# Patient Record
Sex: MALE | Race: WHITE | Employment: FULL TIME | URBAN - METROPOLITAN AREA
[De-identification: names, ages, dates, MRNs, and addresses within clinical notes are randomized per-mention and may not be internally consistent; named-entity substitution may affect disease eponyms.]

---

## 2017-08-31 PROCEDURE — 84153 ASSAY OF PSA TOTAL: CPT | Performed by: INTERNAL MEDICINE

## 2017-09-18 PROBLEM — R01.1 SYSTOLIC MURMUR: Status: ACTIVE | Noted: 2017-09-18

## 2018-07-31 PROCEDURE — 84153 ASSAY OF PSA TOTAL: CPT | Performed by: INTERNAL MEDICINE

## 2018-08-14 PROBLEM — R01.1 SYSTOLIC MURMUR: Status: RESOLVED | Noted: 2017-09-18 | Resolved: 2018-08-14

## 2018-08-14 PROBLEM — R06.83 SNORING: Status: ACTIVE | Noted: 2018-08-14

## 2018-08-14 PROBLEM — Z01.810 PRE-OPERATIVE CARDIOVASCULAR EXAMINATION: Status: ACTIVE | Noted: 2018-08-14

## 2018-08-23 PROBLEM — R94.39 ABNORMAL NUCLEAR STRESS TEST: Status: ACTIVE | Noted: 2018-08-23

## 2018-08-28 ENCOUNTER — HOSPITAL ENCOUNTER (OUTPATIENT)
Dept: CT IMAGING | Facility: HOSPITAL | Age: 58
Discharge: HOME | End: 2018-08-28
Attending: INTERNAL MEDICINE
Payer: COMMERCIAL

## 2018-08-28 DIAGNOSIS — R94.39 ABNORMAL NUCLEAR STRESS TEST: ICD-10-CM

## 2018-08-28 DIAGNOSIS — Z01.810 PRE-OPERATIVE CARDIOVASCULAR EXAMINATION: ICD-10-CM

## 2018-08-28 PROCEDURE — 82565 ASSAY OF CREATININE: CPT

## 2018-08-28 PROCEDURE — 75574 CT ANGIO HRT W/3D IMAGE: CPT | Performed by: INTERNAL MEDICINE

## 2018-08-28 RX ORDER — METOPROLOL TARTRATE 5 MG/5ML
INJECTION INTRAVENOUS
Status: COMPLETED
Start: 2018-08-28 | End: 2018-08-28

## 2018-08-28 RX ORDER — NITROGLYCERIN 0.4 MG/1
TABLET SUBLINGUAL
Status: COMPLETED
Start: 2018-08-28 | End: 2018-08-28

## 2018-08-28 RX ADMIN — METOPROLOL TARTRATE 5 MG: 5 INJECTION INTRAVENOUS at 09:35:00

## 2018-08-28 RX ADMIN — NITROGLYCERIN 0.4 MG: 0.4 TABLET SUBLINGUAL at 09:40:00

## 2018-08-30 LAB — CREAT SERPL-MCNC: 1.1 MG/DL (ref 0.7–1.3)

## 2018-10-03 ENCOUNTER — HOSPITAL ENCOUNTER (OUTPATIENT)
Dept: CT IMAGING | Facility: HOSPITAL | Age: 58
Discharge: HOME | End: 2018-10-03
Attending: INTERNAL MEDICINE
Payer: COMMERCIAL

## 2018-10-03 DIAGNOSIS — R91.1 INCIDENTAL LUNG NODULE, GREATER THAN OR EQUAL TO 8MM: ICD-10-CM

## 2018-10-03 PROCEDURE — 71250 CT THORAX DX C-: CPT | Performed by: INTERNAL MEDICINE

## 2018-10-03 NOTE — PROGRESS NOTES
CT chest confirmed presence of lung nodule along with one enlarged LN.   Will obtain PET scan to further evaluate and would like to have pt come see me in clinic

## 2018-10-11 ENCOUNTER — HOSPITAL ENCOUNTER (OUTPATIENT)
Dept: NUCLEAR MEDICINE | Facility: HOSPITAL | Age: 58
Discharge: HOME | End: 2018-10-11
Attending: INTERNAL MEDICINE
Payer: COMMERCIAL

## 2018-10-11 DIAGNOSIS — R91.1 INCIDENTAL LUNG NODULE, GREATER THAN OR EQUAL TO 8MM: ICD-10-CM

## 2018-10-11 PROCEDURE — 82962 GLUCOSE BLOOD TEST: CPT

## 2018-10-11 PROCEDURE — 78815 PET IMAGE W/CT SKULL-THIGH: CPT | Performed by: INTERNAL MEDICINE

## 2018-10-11 NOTE — PROGRESS NOTES
Please inform pt that PET scan was normal.  There was no sig uptake in enlarged LN. Will follow with repeat scan in 6 months to ensure stability.   Would hold on biopsy b/c of low risk for CA in nonsmoker with normal PET

## 2018-12-14 ENCOUNTER — APPOINTMENT (OUTPATIENT)
Dept: LAB | Facility: HOSPITAL | Age: 58
End: 2018-12-14
Attending: OTOLARYNGOLOGY
Payer: COMMERCIAL

## 2018-12-14 DIAGNOSIS — I10 ESSENTIAL HYPERTENSION, BENIGN: ICD-10-CM

## 2018-12-14 PROCEDURE — 80053 COMPREHEN METABOLIC PANEL: CPT

## 2018-12-14 PROCEDURE — 36415 COLL VENOUS BLD VENIPUNCTURE: CPT

## 2018-12-14 PROCEDURE — 93005 ELECTROCARDIOGRAM TRACING: CPT

## 2018-12-14 PROCEDURE — 93010 ELECTROCARDIOGRAM REPORT: CPT | Performed by: INTERNAL MEDICINE

## 2018-12-27 ENCOUNTER — LAB REQUISITION (OUTPATIENT)
Dept: LAB | Facility: HOSPITAL | Age: 58
End: 2018-12-27
Payer: COMMERCIAL

## 2018-12-27 DIAGNOSIS — L98.0 PYOGENIC GRANULOMA: ICD-10-CM

## 2018-12-27 PROCEDURE — 88342 IMHCHEM/IMCYTCHM 1ST ANTB: CPT | Performed by: OTOLARYNGOLOGY

## 2018-12-27 PROCEDURE — 88305 TISSUE EXAM BY PATHOLOGIST: CPT | Performed by: OTOLARYNGOLOGY

## 2018-12-27 PROCEDURE — 88341 IMHCHEM/IMCYTCHM EA ADD ANTB: CPT | Performed by: OTOLARYNGOLOGY

## 2018-12-27 PROCEDURE — 88365 INSITU HYBRIDIZATION (FISH): CPT | Performed by: OTOLARYNGOLOGY

## 2018-12-27 PROCEDURE — 88321 CONSLTJ&REPRT SLD PREP ELSWR: CPT | Performed by: OTOLARYNGOLOGY

## 2019-02-15 ENCOUNTER — OFFICE VISIT (OUTPATIENT)
Dept: HEMATOLOGY/ONCOLOGY | Facility: HOSPITAL | Age: 59
End: 2019-02-15
Attending: INTERNAL MEDICINE
Payer: COMMERCIAL

## 2019-02-15 VITALS
BODY MASS INDEX: 32.91 KG/M2 | WEIGHT: 243 LBS | HEIGHT: 72 IN | HEART RATE: 99 BPM | TEMPERATURE: 98 F | DIASTOLIC BLOOD PRESSURE: 83 MMHG | OXYGEN SATURATION: 94 % | RESPIRATION RATE: 18 BRPM | SYSTOLIC BLOOD PRESSURE: 135 MMHG

## 2019-02-15 DIAGNOSIS — C90.20: Primary | ICD-10-CM

## 2019-02-15 LAB
ALBUMIN SERPL-MCNC: 3.7 G/DL (ref 3.4–5)
ALBUMIN/GLOB SERPL: 1.1 {RATIO} (ref 1–2)
ALP LIVER SERPL-CCNC: 86 U/L (ref 45–117)
ALT SERPL-CCNC: 22 U/L (ref 16–61)
ANION GAP SERPL CALC-SCNC: 6 MMOL/L (ref 0–18)
AST SERPL-CCNC: 16 U/L (ref 15–37)
B2 MICROGLOB SERPL-MCNC: 0.2 MG/DL (ref 0.11–0.25)
BASOPHILS # BLD AUTO: 0.07 X10(3) UL (ref 0–0.2)
BASOPHILS NFR BLD AUTO: 1.2 %
BILIRUB DIRECT SERPL-MCNC: 0.1 MG/DL (ref 0–0.2)
BILIRUB SERPL-MCNC: 0.5 MG/DL (ref 0.1–2)
BUN BLD-MCNC: 13 MG/DL (ref 7–18)
BUN/CREAT SERPL: 12.9 (ref 10–20)
CALCIUM BLD-MCNC: 8.4 MG/DL (ref 8.5–10.1)
CHLORIDE SERPL-SCNC: 109 MMOL/L (ref 98–107)
CO2 SERPL-SCNC: 28 MMOL/L (ref 21–32)
CREAT BLD-MCNC: 1.01 MG/DL (ref 0.7–1.3)
DEPRECATED RDW RBC AUTO: 41.6 FL (ref 35.1–46.3)
EOSINOPHIL # BLD AUTO: 0.22 X10(3) UL (ref 0–0.7)
EOSINOPHIL NFR BLD AUTO: 3.7 %
ERYTHROCYTE [DISTWIDTH] IN BLOOD BY AUTOMATED COUNT: 14.1 % (ref 11–15)
GLOBULIN PLAS-MCNC: 3.3 G/DL (ref 2.8–4.4)
GLUCOSE BLD-MCNC: 101 MG/DL (ref 70–99)
HAV IGM SER QL: NONREACTIVE
HBV CORE IGM SER QL: NONREACTIVE
HBV SURFACE AG SERPL QL IA: NONREACTIVE
HCT VFR BLD AUTO: 43.4 % (ref 39–53)
HCV AB SERPL QL IA: NONREACTIVE
HGB BLD-MCNC: 14.4 G/DL (ref 13–17.5)
IGA SERPL-MCNC: 168 MG/DL (ref 70–312)
IGM SERPL-MCNC: 27.5 MG/DL (ref 43–279)
IMM GRANULOCYTES # BLD AUTO: 0.01 X10(3) UL (ref 0–1)
IMM GRANULOCYTES NFR BLD: 0.2 %
IMMUNOGLOBULIN PNL SER-MCNC: 850 MG/DL (ref 791–1643)
LDH SERPL L TO P-CCNC: 170 U/L (ref 87–241)
LYMPHOCYTES # BLD AUTO: 1.8 X10(3) UL (ref 1–4)
LYMPHOCYTES NFR BLD AUTO: 29.9 %
M PROTEIN MFR SERPL ELPH: 7 G/DL (ref 6.4–8.2)
MCH RBC QN AUTO: 27.3 PG (ref 26–34)
MCHC RBC AUTO-ENTMCNC: 33.2 G/DL (ref 31–37)
MCV RBC AUTO: 82.4 FL (ref 80–100)
MONOCYTES # BLD AUTO: 0.61 X10(3) UL (ref 0.1–1)
MONOCYTES NFR BLD AUTO: 10.1 %
NEUTROPHILS # BLD AUTO: 3.31 X10 (3) UL (ref 1.5–7.7)
NEUTROPHILS # BLD AUTO: 3.31 X10(3) UL (ref 1.5–7.7)
NEUTROPHILS NFR BLD AUTO: 54.9 %
OSMOLALITY SERPL CALC.SUM OF ELEC: 296 MOSM/KG (ref 275–295)
PLATELET # BLD AUTO: 268 10(3)UL (ref 150–450)
POTASSIUM SERPL-SCNC: 3.9 MMOL/L (ref 3.5–5.1)
RBC # BLD AUTO: 5.27 X10(6)UL (ref 4.3–5.7)
SODIUM SERPL-SCNC: 143 MMOL/L (ref 136–145)
WBC # BLD AUTO: 6 X10(3) UL (ref 4–11)

## 2019-02-15 PROCEDURE — 99245 OFF/OP CONSLTJ NEW/EST HI 55: CPT | Performed by: INTERNAL MEDICINE

## 2019-02-15 RX ORDER — LORAZEPAM 1 MG/1
1 TABLET ORAL EVERY 6 HOURS PRN
Qty: 10 TABLET | Refills: 0 | Status: SHIPPED
Start: 2019-02-15 | End: 2019-02-22

## 2019-02-15 NOTE — PROGRESS NOTES
Patient is here for MD consult. Patient was found to have a mediastinal lymph node seen on a CT scan in October. PET/Ct done on 10/11. Prior to having knee replacement surgery, patient was told he had obstruction of the nasal passages.  Patient then followe

## 2019-02-16 NOTE — CONSULTS
[facility]    PATIENT'S NAME: Akila Slater   CONSULTING PHYSICIAN: Giovana Pelayo M.D.    PATIENT ACCOUNT #: [de-identified] LOCATION: [address] [place] [address] RECORD #: [id number] YOB: 1960   CONSULTATION DATE: 02/15/2019       CANCER [address] It was interpreted as a plasmacytic neoplasm consistent with multiple myeloma or plasmacytoma. The cells were of low proliferative rate with a Ki-67 around 10%. There were some higher proliferations at the periphery of the lesion.   The plas Penicillin. REVIEW OF SYSTEMS:  No headaches. His vision is fair. His hearing is good. His teeth and gums are in good repair. He denies any asthma, cough, or shortness of breath. He denies any GERD. He has never had an upper endoscopy.   He had a c low IgM at 27.5. Hepatitis panel was checked and was negative. CBC is normal.  Beta-2 microglobulin was normal.  LDH is normal.    IMPRESSION:  Extramedullary plasmacytoma.   He has no evidence of any other focal lesions based upon a PET scan done in [facility] reassuring. I will plan on doing a bone marrow on him once he returns. His questions were answered. I will see him again to perform his bone marrow in March, and then we will proceed with the appropriate intervention thereafter.     Dictated By Martha Miranda

## 2019-02-21 LAB
ALBUMIN SERPL-MCNC: 4.28 G/DL (ref 3.1–4.5)
ALBUMIN/GLOB SERPL: 1.84 {RATIO}
ALPHA1 GLOB SERPL ELPH-MCNC: 0.2 G/DL (ref 0.1–0.3)
ALPHA2 GLOB SERPL ELPH-MCNC: 0.71 G/DL (ref 0.6–1)
B-GLOBULIN SERPL ELPH-MCNC: 0.67 G/DL (ref 0.7–1.2)
GAMMA GLOB SERPL ELPH-MCNC: 0.74 G/DL (ref 0.6–1.6)
KAPPA FREE LIGHT CHAIN: 1.9 MG/DL (ref 0.33–1.94)
KAPPA/LAMBDA FLC RATIO: 1.05 (ref 0.26–1.65)
LAMBDA FREE LIGHT CHAIN: 1.81 MG/DL (ref 0.57–2.63)
MAI PROTEIN SERPL-MCNC: 6.6 G/DL (ref 6.4–8.2)

## 2019-03-07 ENCOUNTER — ANESTHESIA EVENT (OUTPATIENT)
Dept: MRI IMAGING | Facility: HOSPITAL | Age: 59
End: 2019-03-07

## 2019-03-19 ENCOUNTER — ANESTHESIA (OUTPATIENT)
Dept: MRI IMAGING | Facility: HOSPITAL | Age: 59
End: 2019-03-19

## 2019-03-19 ENCOUNTER — HOSPITAL ENCOUNTER (OUTPATIENT)
Dept: MRI IMAGING | Facility: HOSPITAL | Age: 59
Discharge: HOME | End: 2019-03-19
Attending: INTERNAL MEDICINE
Payer: COMMERCIAL

## 2019-03-19 VITALS
HEIGHT: 73 IN | HEART RATE: 74 BPM | RESPIRATION RATE: 18 BRPM | TEMPERATURE: 98 F | OXYGEN SATURATION: 97 % | SYSTOLIC BLOOD PRESSURE: 117 MMHG | WEIGHT: 236 LBS | BODY MASS INDEX: 31.28 KG/M2 | DIASTOLIC BLOOD PRESSURE: 80 MMHG

## 2019-03-19 DIAGNOSIS — C90.20 EXTRAMEDULLARY PLASMACYTOMA (HCC): ICD-10-CM

## 2019-03-19 PROCEDURE — 70543 MRI ORBT/FAC/NCK W/O &W/DYE: CPT | Performed by: INTERNAL MEDICINE

## 2019-03-19 PROCEDURE — A9575 INJ GADOTERATE MEGLUMI 0.1ML: HCPCS | Performed by: INTERNAL MEDICINE

## 2019-03-19 RX ORDER — HYDROCODONE BITARTRATE AND ACETAMINOPHEN 5; 325 MG/1; MG/1
1 TABLET ORAL AS NEEDED
Status: DISCONTINUED | OUTPATIENT
Start: 2019-03-19 | End: 2019-03-21

## 2019-03-19 RX ORDER — HYDROMORPHONE HYDROCHLORIDE 1 MG/ML
0.4 INJECTION, SOLUTION INTRAMUSCULAR; INTRAVENOUS; SUBCUTANEOUS EVERY 5 MIN PRN
Status: ACTIVE | OUTPATIENT
Start: 2019-03-19 | End: 2019-03-20

## 2019-03-19 RX ORDER — SODIUM CHLORIDE, SODIUM LACTATE, POTASSIUM CHLORIDE, CALCIUM CHLORIDE 600; 310; 30; 20 MG/100ML; MG/100ML; MG/100ML; MG/100ML
INJECTION, SOLUTION INTRAVENOUS CONTINUOUS
Status: DISCONTINUED | OUTPATIENT
Start: 2019-03-19 | End: 2019-03-21

## 2019-03-19 RX ORDER — DEXAMETHASONE SODIUM PHOSPHATE 4 MG/ML
8 VIAL (ML) INJECTION AS NEEDED
Status: ACTIVE | OUTPATIENT
Start: 2019-03-19 | End: 2019-03-20

## 2019-03-19 RX ORDER — NALOXONE HYDROCHLORIDE 0.4 MG/ML
80 INJECTION, SOLUTION INTRAMUSCULAR; INTRAVENOUS; SUBCUTANEOUS AS NEEDED
Status: ACTIVE | OUTPATIENT
Start: 2019-03-19 | End: 2019-03-20

## 2019-03-19 RX ORDER — ONDANSETRON 2 MG/ML
4 INJECTION INTRAMUSCULAR; INTRAVENOUS AS NEEDED
Status: ACTIVE | OUTPATIENT
Start: 2019-03-19 | End: 2019-03-20

## 2019-03-19 RX ORDER — METOCLOPRAMIDE HYDROCHLORIDE 5 MG/ML
10 INJECTION INTRAMUSCULAR; INTRAVENOUS AS NEEDED
Status: ACTIVE | OUTPATIENT
Start: 2019-03-19 | End: 2019-03-20

## 2019-03-19 RX ORDER — MEPERIDINE HYDROCHLORIDE 25 MG/ML
12.5 INJECTION INTRAMUSCULAR; INTRAVENOUS; SUBCUTANEOUS AS NEEDED
Status: DISCONTINUED | OUTPATIENT
Start: 2019-03-19 | End: 2019-03-21

## 2019-03-19 RX ORDER — HYDROCODONE BITARTRATE AND ACETAMINOPHEN 5; 325 MG/1; MG/1
2 TABLET ORAL AS NEEDED
Status: DISCONTINUED | OUTPATIENT
Start: 2019-03-19 | End: 2019-03-21

## 2019-03-19 RX ORDER — MIDAZOLAM HYDROCHLORIDE 1 MG/ML
1 INJECTION INTRAMUSCULAR; INTRAVENOUS EVERY 5 MIN PRN
Status: ACTIVE | OUTPATIENT
Start: 2019-03-19 | End: 2019-03-20

## 2019-03-19 NOTE — ANESTHESIA PREPROCEDURE EVALUATION
PRE-OP EVALUATION    Patient Name: Pj Broderick    Pre-op Diagnosis: plasmacytoma    MRI SINUS  Pre-op vitals reviewed. Temp: 97.8 °F (36.6 °C)  Pulse: 77  Resp: 16  BP: 131/79  SpO2: 95 %  Body mass index is 31.14 kg/m².     Current medications revie signed by  Krystle Chowdary MD  02/14/2019 11:43      HPI:   Lawanda Boast is a 62year old male who presents for follow-up of hyperlipidemia, hypertension, PVCs, and mitral valve prolapse with moderate-to-severe MR.  He has nonobstructive CAD exercising 5 days a week (walking and biking) for 30-45 minutes at a moderate intensity.             Exercise tolerance: good     MET: >4         (+) hyperlipidemia  (+) CAD        (+) valvular problems/murmurs and MVP                       Endo/Other    Ne Cardiovascular      Rhythm: regular  Rate: normal  (-) murmur   Dental  Comment: Dentition is grossly intact; Patient does not demonstrate loose teeth to inspection. No notable dental history.          Pulmonary    Pulmonary exam normal.  Breath sounds cl

## 2019-03-19 NOTE — ANESTHESIA POSTPROCEDURE EVALUATION
[facility] Patient Status:  Outpatient   Age/Gender 62year old male MRN [id number]   Location [facility] MRI Attending Cliff Willams MD   Hosp Day # 0 PCP Harpal Faith DO       Anesthesia Post-op Note    MRI SINUS AND F

## 2019-03-22 ENCOUNTER — OFFICE VISIT (OUTPATIENT)
Dept: HEMATOLOGY/ONCOLOGY | Facility: HOSPITAL | Age: 59
End: 2019-03-22
Attending: INTERNAL MEDICINE
Payer: COMMERCIAL

## 2019-03-22 VITALS
OXYGEN SATURATION: 96 % | DIASTOLIC BLOOD PRESSURE: 76 MMHG | SYSTOLIC BLOOD PRESSURE: 120 MMHG | HEART RATE: 74 BPM | BODY MASS INDEX: 33.13 KG/M2 | RESPIRATION RATE: 16 BRPM | HEIGHT: 71.89 IN | TEMPERATURE: 98 F | WEIGHT: 244.63 LBS

## 2019-03-22 DIAGNOSIS — C90.20 EXTRAMEDULLARY PLASMACYTOMA (HCC): Primary | ICD-10-CM

## 2019-03-22 PROCEDURE — 38222 DX BONE MARROW BX & ASPIR: CPT | Performed by: INTERNAL MEDICINE

## 2019-03-22 NOTE — PROGRESS NOTES
Date of visit: 3/22/2019    Diagnosis:  Extramedullary plasmacytoma (hcc)  (primary encounter diagnosis)    Narrative:  Rigoberto Gudino presents for a bone marrow biopsy and aspirate.   I explained the procedure to the patient including the risk and benef

## 2019-03-23 NOTE — ADDENDUM NOTE
Addended by: [facility] on: 3/22/2019 11:03 PM     Modules accepted: Orders

## 2019-03-27 LAB
CD10 CELLS NFR SPEC: 4 %
CD11C CELLS NFR SPEC: 9 %
CD14 CELLS NFR SPEC: 1 %
CD19 CELLS NFR SPEC: 29 %
CD19/CD10 CELLS: 3 %
CD20 CELLS NFR SPEC: 27 %
CD22 CELLS NFR SPEC: 26 %
CD23 CELLS NFR SPEC: 22 %
CD3 CELLS NFR SPEC: 64 %
CD3+CD4+ CELLS NFR SPEC: 30 %
CD3+CD4+ CELLS/CD3+CD8+ CLL SPEC: 1
CD3+CD8+ CELLS NFR SPEC: 31 %
CD45 CELLS NFR SPEC: 100 %
CD45-/CD38+ KAPPA: 53 %
CD45-/CD38+ LAMBDA: 39 %
CD5 CELLS NFR SPEC: 65 %
CD5/CD19 CELLS: 3 %
CD56 CELLS NFR SPEC: 4 %
CD56 CELLS NFR SPEC: 5 %
CD7 CELLS NFR SPEC: 64 %
CELL SURF KAPPA/LAMBDA RATIO: 0.9
CELL SURF LAMBDA LIGHT CHAIN: 16 %
CELL SURFACE KAPPA LIGHT CHAIN: 15 %
FMC7 CELLS NFR SPEC: 25 %

## 2019-05-15 PROBLEM — Z96.651 HISTORY OF TOTAL RIGHT KNEE REPLACEMENT: Status: ACTIVE | Noted: 2019-05-15

## 2019-05-15 PROBLEM — M1A.0720 CHRONIC IDIOPATHIC GOUT INVOLVING TOE OF LEFT FOOT: Status: ACTIVE | Noted: 2019-05-15

## 2019-05-15 PROBLEM — Z01.810 PRE-OPERATIVE CARDIOVASCULAR EXAMINATION: Status: RESOLVED | Noted: 2018-08-14 | Resolved: 2019-05-15

## 2019-06-07 ENCOUNTER — OFFICE VISIT (OUTPATIENT)
Dept: HEMATOLOGY/ONCOLOGY | Facility: HOSPITAL | Age: 59
End: 2019-06-07
Attending: INTERNAL MEDICINE
Payer: COMMERCIAL

## 2019-06-07 VITALS
HEIGHT: 72.99 IN | SYSTOLIC BLOOD PRESSURE: 135 MMHG | OXYGEN SATURATION: 96 % | WEIGHT: 245.81 LBS | DIASTOLIC BLOOD PRESSURE: 81 MMHG | TEMPERATURE: 97 F | HEART RATE: 69 BPM | RESPIRATION RATE: 20 BRPM | BODY MASS INDEX: 32.58 KG/M2

## 2019-06-07 DIAGNOSIS — C90.21 EXTRAMEDULLARY PLASMACYTOMA IN REMISSION (HCC): Primary | ICD-10-CM

## 2019-06-07 PROCEDURE — 99213 OFFICE O/P EST LOW 20 MIN: CPT | Performed by: INTERNAL MEDICINE

## 2019-06-07 RX ORDER — INDOMETHACIN 50 MG/1
CAPSULE ORAL AS NEEDED
COMMUNITY
Start: 2019-05-15 | End: 2020-02-10

## 2019-06-07 NOTE — PROGRESS NOTES
Md f/u for extramedullary plasmacytoma. Reports that he is feeling well and denies any issues or complaints.      Education Record    Learner:  Patient    Barriers / Limitations:  None   Comments:    Method:  Discussion   Comments:    General Topics:  Plan

## 2019-06-08 NOTE — PROGRESS NOTES
[facility]    PATIENT'S NAME: Alfredo Mata   ATTENDING PHYSICIAN: Sumit Smith M.D.    PATIENT ACCOUNT #: [de-identified] LOCATION: [address] [place] [address] RECORD #: [id number] YOB: 1960   DATE OF SERVICE: 06/07/2019       CANCER CE aspirin 81 mg daily; atorvastatin 40 mg at bedtime; clindamycin 600 mg 1 hour prior to dental work; indomethacin, which he has not taken yet; valsartan/hydrochlorothiazide 320/12.5.       PHYSICAL EXAMINATION:    GENERAL:  He is a well-developed, well-ritesh

## 2019-12-18 ENCOUNTER — TELEPHONE (OUTPATIENT)
Dept: HEMATOLOGY/ONCOLOGY | Facility: HOSPITAL | Age: 59
End: 2019-12-18

## 2019-12-18 NOTE — TELEPHONE ENCOUNTER
Patient is having a MRI of his face and would like to get sedation for his mri. Central scheduling would like you to put in an order.  Thank you

## 2019-12-18 NOTE — TELEPHONE ENCOUNTER
Called and spoke with pt for clarification. He states that when he had his last MRI he was put under and needs to be \"knocked out\" for the MRI asking for new order with sedation.  Told pt would let MD know and once ordered will call back so he can schedul

## 2020-02-10 RX ORDER — SODIUM CHLORIDE, SODIUM LACTATE, POTASSIUM CHLORIDE, CALCIUM CHLORIDE 600; 310; 30; 20 MG/100ML; MG/100ML; MG/100ML; MG/100ML
INJECTION, SOLUTION INTRAVENOUS CONTINUOUS
Status: CANCELLED | OUTPATIENT
Start: 2020-02-10

## 2020-02-12 ENCOUNTER — APPOINTMENT (OUTPATIENT)
Dept: LAB | Facility: HOSPITAL | Age: 60
End: 2020-02-12
Payer: COMMERCIAL

## 2020-02-12 DIAGNOSIS — C90.21 EXTRAMEDULLARY PLASMACYTOMA IN REMISSION (HCC): ICD-10-CM

## 2020-02-12 PROCEDURE — 93005 ELECTROCARDIOGRAM TRACING: CPT

## 2020-02-12 PROCEDURE — 93010 ELECTROCARDIOGRAM REPORT: CPT | Performed by: INTERNAL MEDICINE

## 2020-02-13 LAB
ATRIAL RATE: 72 BPM
P AXIS: 40 DEGREES
P-R INTERVAL: 122 MS
Q-T INTERVAL: 366 MS
QRS DURATION: 110 MS
QTC CALCULATION (BEZET): 400 MS
R AXIS: 15 DEGREES
T AXIS: 22 DEGREES
VENTRICULAR RATE: 72 BPM

## 2020-02-18 ENCOUNTER — ANESTHESIA (OUTPATIENT)
Dept: MRI IMAGING | Facility: HOSPITAL | Age: 60
End: 2020-02-18
Payer: COMMERCIAL

## 2020-02-18 ENCOUNTER — ANESTHESIA EVENT (OUTPATIENT)
Dept: MRI IMAGING | Facility: HOSPITAL | Age: 60
End: 2020-02-18
Payer: COMMERCIAL

## 2020-02-18 ENCOUNTER — HOSPITAL ENCOUNTER (OUTPATIENT)
Dept: MRI IMAGING | Facility: HOSPITAL | Age: 60
Discharge: HOME | End: 2020-02-18
Attending: INTERNAL MEDICINE
Payer: COMMERCIAL

## 2020-02-18 VITALS
OXYGEN SATURATION: 100 % | SYSTOLIC BLOOD PRESSURE: 135 MMHG | HEART RATE: 70 BPM | DIASTOLIC BLOOD PRESSURE: 82 MMHG | HEIGHT: 73 IN | BODY MASS INDEX: 29.42 KG/M2 | WEIGHT: 222 LBS | RESPIRATION RATE: 18 BRPM | TEMPERATURE: 98 F

## 2020-02-18 DIAGNOSIS — C90.21 EXTRAMEDULLARY PLASMACYTOMA IN REMISSION (HCC): Primary | ICD-10-CM

## 2020-02-18 PROCEDURE — A9575 INJ GADOTERATE MEGLUMI 0.1ML: HCPCS

## 2020-02-18 PROCEDURE — 70543 MRI ORBT/FAC/NCK W/O &W/DYE: CPT | Performed by: INTERNAL MEDICINE

## 2020-02-18 NOTE — ANESTHESIA PREPROCEDURE EVALUATION
PRE-OP EVALUATION    Patient Name: Herve Jordan    Pre-op Diagnosis: * No surgery found *    * No surgery found *    * Surgery not found *    Pre-op vitals reviewed.   Temp: 96 °F (35.6 °C)  Pulse: 76  Resp: 22  BP: 141/86  SpO2: 97 %  Body mass index 02/05/2020     Lab Results   Component Value Date     02/05/2020    K 4.40 02/05/2020     02/05/2020    CO2 29.2 02/05/2020    BUN 17.0 02/05/2020    CREATSERUM 1.14 02/05/2020     (H) 02/05/2020    CA 8.5 02/05/2020            Airway

## 2020-02-18 NOTE — ANESTHESIA POSTPROCEDURE EVALUATION
One [facility] Patient Status:  Outpatient   Age/Gender 61year old male MRN [id number]   Location [facility] [place] MRI Attending Yazmin Mendieta MD   Hosp Day # 0 PCP Aruna Davidson MD       Anesthesia Post-op Note    * No procedures

## 2020-03-10 ENCOUNTER — OFFICE VISIT (OUTPATIENT)
Dept: HEMATOLOGY/ONCOLOGY | Facility: HOSPITAL | Age: 60
End: 2020-03-10
Attending: INTERNAL MEDICINE
Payer: COMMERCIAL

## 2020-03-10 VITALS
SYSTOLIC BLOOD PRESSURE: 133 MMHG | BODY MASS INDEX: 32 KG/M2 | WEIGHT: 240.19 LBS | TEMPERATURE: 97 F | RESPIRATION RATE: 18 BRPM | OXYGEN SATURATION: 96 % | HEART RATE: 81 BPM | DIASTOLIC BLOOD PRESSURE: 78 MMHG

## 2020-03-10 DIAGNOSIS — R91.1 LUNG NODULE: Primary | ICD-10-CM

## 2020-03-10 DIAGNOSIS — C90.21 EXTRAMEDULLARY PLASMACYTOMA IN REMISSION (HCC): ICD-10-CM

## 2020-03-10 LAB
ALBUMIN SERPL-MCNC: 3.4 G/DL (ref 3.4–5)
ALBUMIN/GLOB SERPL: 0.9 {RATIO} (ref 1–2)
ALP LIVER SERPL-CCNC: 76 U/L (ref 45–117)
ALT SERPL-CCNC: 25 U/L (ref 16–61)
ANION GAP SERPL CALC-SCNC: 3 MMOL/L (ref 0–18)
AST SERPL-CCNC: 18 U/L (ref 15–37)
B2 MICROGLOB SERPL-MCNC: 0.2 MG/DL (ref 0.11–0.25)
BASOPHILS # BLD AUTO: 0.08 X10(3) UL (ref 0–0.2)
BASOPHILS NFR BLD AUTO: 1.1 %
BILIRUB SERPL-MCNC: 0.8 MG/DL (ref 0.1–2)
BUN BLD-MCNC: 15 MG/DL (ref 7–18)
BUN/CREAT SERPL: 16.3 (ref 10–20)
CALCIUM BLD-MCNC: 8.9 MG/DL (ref 8.5–10.1)
CHLORIDE SERPL-SCNC: 109 MMOL/L (ref 98–112)
CO2 SERPL-SCNC: 28 MMOL/L (ref 21–32)
CREAT BLD-MCNC: 0.92 MG/DL (ref 0.7–1.3)
DEPRECATED RDW RBC AUTO: 43.8 FL (ref 35.1–46.3)
EOSINOPHIL # BLD AUTO: 0.15 X10(3) UL (ref 0–0.7)
EOSINOPHIL NFR BLD AUTO: 2.1 %
ERYTHROCYTE [DISTWIDTH] IN BLOOD BY AUTOMATED COUNT: 14.1 % (ref 11–15)
GLOBULIN PLAS-MCNC: 3.8 G/DL (ref 2.8–4.4)
GLUCOSE BLD-MCNC: 95 MG/DL (ref 70–99)
HCT VFR BLD AUTO: 41.8 % (ref 39–53)
HGB BLD-MCNC: 13.7 G/DL (ref 13–17.5)
IGA SERPL-MCNC: 138 MG/DL (ref 70–312)
IGM SERPL-MCNC: 31.7 MG/DL (ref 43–279)
IMM GRANULOCYTES # BLD AUTO: 0.03 X10(3) UL (ref 0–1)
IMM GRANULOCYTES NFR BLD: 0.4 %
IMMUNOGLOBULIN PNL SER-MCNC: 1150 MG/DL (ref 791–1643)
LYMPHOCYTES # BLD AUTO: 1.82 X10(3) UL (ref 1–4)
LYMPHOCYTES NFR BLD AUTO: 26.1 %
M PROTEIN MFR SERPL ELPH: 7.2 G/DL (ref 6.4–8.2)
MCH RBC QN AUTO: 27.9 PG (ref 26–34)
MCHC RBC AUTO-ENTMCNC: 32.8 G/DL (ref 31–37)
MCV RBC AUTO: 85.1 FL (ref 80–100)
MONOCYTES # BLD AUTO: 0.57 X10(3) UL (ref 0.1–1)
MONOCYTES NFR BLD AUTO: 8.2 %
NEUTROPHILS # BLD AUTO: 4.33 X10 (3) UL (ref 1.5–7.7)
NEUTROPHILS # BLD AUTO: 4.33 X10(3) UL (ref 1.5–7.7)
NEUTROPHILS NFR BLD AUTO: 62.1 %
OSMOLALITY SERPL CALC.SUM OF ELEC: 291 MOSM/KG (ref 275–295)
PLATELET # BLD AUTO: 271 10(3)UL (ref 150–450)
POTASSIUM SERPL-SCNC: 3.6 MMOL/L (ref 3.5–5.1)
RBC # BLD AUTO: 4.91 X10(6)UL (ref 4.3–5.7)
SODIUM SERPL-SCNC: 140 MMOL/L (ref 136–145)
WBC # BLD AUTO: 7 X10(3) UL (ref 4–11)

## 2020-03-10 PROCEDURE — 99214 OFFICE O/P EST MOD 30 MIN: CPT | Performed by: INTERNAL MEDICINE

## 2020-03-10 NOTE — PROGRESS NOTES
Patient is here for MD f/u for plasmacytoma. He states he is feeling well. Appetite and energy level is good. Denies pain. Patient had a skin lesion removed from his back by the dermatologist last week. No complaints at present time.        Education Record

## 2020-03-11 ENCOUNTER — TELEPHONE (OUTPATIENT)
Dept: HEMATOLOGY/ONCOLOGY | Facility: HOSPITAL | Age: 60
End: 2020-03-11

## 2020-03-11 LAB
KAPPA FREE LIGHT CHAIN: 3.13 MG/DL (ref 0.33–1.94)
KAPPA/LAMBDA FLC RATIO: 1.18 (ref 0.26–1.65)
LAMBDA FREE LIGHT CHAIN: 2.67 MG/DL (ref 0.57–2.63)

## 2020-03-11 NOTE — TELEPHONE ENCOUNTER
Answered patient's questions. His last CT scan was at [facility] 18 months ago. He will call central scheduling to get his Ct scan that was ordered by Dr Jg Pool.

## 2020-03-11 NOTE — TELEPHONE ENCOUNTER
Rose Hurley called to say that he has to get a follow up CT of chest the current order that is in is still showing open so it cant be scheduled. He asked that a new order be placed so he can move forward with getting that scheduled.  He also wanted to make sure mary anne

## 2020-03-13 LAB
ALBUMIN SERPL ELPH-MCNC: 3.89 G/DL (ref 3.75–5.21)
ALBUMIN/GLOB SERPL: 1.39 {RATIO} (ref 1–2)
ALPHA1 GLOB SERPL ELPH-MCNC: 0.29 G/DL (ref 0.19–0.46)
ALPHA2 GLOB SERPL ELPH-MCNC: 0.64 G/DL (ref 0.48–1.05)
B-GLOBULIN SERPL ELPH-MCNC: 0.7 G/DL (ref 0.68–1.23)
GAMMA GLOB SERPL ELPH-MCNC: 1.18 G/DL (ref 0.62–1.7)
M PROTEIN MFR SERPL ELPH: 6.7 G/DL (ref 6.4–8.2)

## 2020-03-18 ENCOUNTER — TELEPHONE (OUTPATIENT)
Dept: HEMATOLOGY/ONCOLOGY | Facility: HOSPITAL | Age: 60
End: 2020-03-18

## 2020-03-18 NOTE — TELEPHONE ENCOUNTER
Tyshawn's appointment was rescheduled and now his referral will  before his new appointment. Can you put in a new referral they made appointment. If Gisela Godinez doesn't answer you can leave message.

## 2020-03-23 NOTE — PROGRESS NOTES
[facility]    PATIENT'S NAME: Minus Meter   ATTENDING PHYSICIAN: Desi Gay M.D.    PATIENT ACCOUNT #: [de-identified] LOCATION: [address] [place] [address] RECORD #: [id number] YOB: 1960   DATE OF SERVICE: 03/10/2020       CANCER CE respiratory rate is 20, temperature is 96.8. HEENT:  Unremarkable. He has pink conjunctivae, anicteric sclerae. Pharynx without lesions. LYMPHATICS:  He has no cervical, supraclavicular, or axillary adenopathy.    LUNGS:  Resonant to percussion and c

## 2020-04-07 ENCOUNTER — TELEPHONE (OUTPATIENT)
Dept: HEMATOLOGY/ONCOLOGY | Facility: HOSPITAL | Age: 60
End: 2020-04-07

## 2020-04-07 NOTE — TELEPHONE ENCOUNTER
Order is actually good until 3/2021. Spoke with Maria Antonia Reyes in [facility] who confirmed this.

## 2020-04-07 NOTE — TELEPHONE ENCOUNTER
Central scheduling called saying they had to reschedule the patient's CT out to May 5, but his order expires before that. She would like a new order placed.

## 2020-05-05 ENCOUNTER — HOSPITAL ENCOUNTER (OUTPATIENT)
Dept: CT IMAGING | Facility: HOSPITAL | Age: 60
Discharge: HOME | End: 2020-05-05
Attending: INTERNAL MEDICINE
Payer: COMMERCIAL

## 2020-05-05 DIAGNOSIS — R91.1 LUNG NODULE: ICD-10-CM

## 2020-05-05 PROCEDURE — 71260 CT THORAX DX C+: CPT | Performed by: INTERNAL MEDICINE

## 2020-05-05 PROCEDURE — 82565 ASSAY OF CREATININE: CPT

## 2020-12-17 ENCOUNTER — TELEPHONE (OUTPATIENT)
Dept: HEMATOLOGY/ONCOLOGY | Facility: HOSPITAL | Age: 60
End: 2020-12-17

## 2020-12-17 NOTE — TELEPHONE ENCOUNTER
Spoke to patient. Has a deformed 5th digit right hand - saw Dr Shree Anderson. Concerned about some bone loss and possible path fx. Will see him tomorrow. He dropped off MRI which is being uploaded in radiology.   NEHA Price

## 2020-12-18 ENCOUNTER — TELEPHONE (OUTPATIENT)
Dept: HEMATOLOGY/ONCOLOGY | Facility: HOSPITAL | Age: 60
End: 2020-12-18

## 2020-12-18 ENCOUNTER — OFFICE VISIT (OUTPATIENT)
Dept: HEMATOLOGY/ONCOLOGY | Facility: HOSPITAL | Age: 60
End: 2020-12-18
Attending: INTERNAL MEDICINE
Payer: COMMERCIAL

## 2020-12-18 VITALS
WEIGHT: 246.81 LBS | BODY MASS INDEX: 32.71 KG/M2 | DIASTOLIC BLOOD PRESSURE: 74 MMHG | OXYGEN SATURATION: 96 % | RESPIRATION RATE: 16 BRPM | TEMPERATURE: 98 F | HEART RATE: 72 BPM | HEIGHT: 72.99 IN | SYSTOLIC BLOOD PRESSURE: 130 MMHG

## 2020-12-18 DIAGNOSIS — M89.9 LYTIC LESION OF BONE ON X-RAY: ICD-10-CM

## 2020-12-18 DIAGNOSIS — C90.21 EXTRAMEDULLARY PLASMACYTOMA IN REMISSION (HCC): Primary | ICD-10-CM

## 2020-12-18 PROCEDURE — 99215 OFFICE O/P EST HI 40 MIN: CPT | Performed by: INTERNAL MEDICINE

## 2020-12-18 RX ORDER — LORAZEPAM 1 MG/1
TABLET ORAL
Qty: 10 TABLET | Refills: 0 | Status: SHIPPED | OUTPATIENT
Start: 2020-12-18 | End: 2021-04-16 | Stop reason: ALTCHOICE

## 2020-12-18 NOTE — TELEPHONE ENCOUNTER
Eric Vo called to let Feion Paula know he tried to schedule his PET scan, but the first available is 1/7. He is wondering how to get this done sooner.

## 2020-12-18 NOTE — PROGRESS NOTES
Patient is here for MD visit. Hx of plasmacytoma. Patient fractured his 5th digit right hand. Patient had xrays and there is concern for possible bone loss. Patient is here to further discuss.        Education Record    Learner:  Patient    Disease / Kylah Kimball

## 2020-12-19 NOTE — PROGRESS NOTES
[facility]    PATIENT'S NAME: Victor M Fernandez   ATTENDING PHYSICIAN: Carla Luna M.D.    PATIENT ACCOUNT #: [de-identified] LOCATION: [address] [place] [address] RECORD #: [id number] YOB: 1960   DATE OF SERVICE: 12/18/2020       CANCER CE for him to come in and see us. He has not yet had the MRI of the hand.   The patient has no other symptoms other than the fact that he had a little bit of discomfort in his left foot or ankle, though he is not sure if this is only due to his anxiety that h The differential diagnosis obviously involves potential development of multiple myeloma. I have ordered a PET scan in order to characterize the skeleton further. He did have a previous PET for comparison.   If there is nothing evident by this imaging mod

## 2020-12-23 ENCOUNTER — HOSPITAL ENCOUNTER (OUTPATIENT)
Dept: NUCLEAR MEDICINE | Facility: HOSPITAL | Age: 60
Discharge: HOME | End: 2020-12-23
Attending: INTERNAL MEDICINE
Payer: COMMERCIAL

## 2020-12-23 ENCOUNTER — TELEPHONE (OUTPATIENT)
Dept: HEMATOLOGY/ONCOLOGY | Facility: HOSPITAL | Age: 60
End: 2020-12-23

## 2020-12-23 DIAGNOSIS — C90.21 EXTRAMEDULLARY PLASMACYTOMA IN REMISSION (HCC): ICD-10-CM

## 2020-12-23 DIAGNOSIS — M89.9 LYTIC LESION OF BONE ON X-RAY: ICD-10-CM

## 2020-12-23 PROCEDURE — 78816 PET IMAGE W/CT FULL BODY: CPT | Performed by: INTERNAL MEDICINE

## 2020-12-23 PROCEDURE — 82962 GLUCOSE BLOOD TEST: CPT

## 2020-12-23 NOTE — TELEPHONE ENCOUNTER
Spoke to patient about PET results. Unusual pattern of lesions - all in distal skeleton rather than central axial skeleton. Will obtain skeletal survey. Discussed with IR and distal tibial lesion looks most amenable to biopsy.   Will do a bone marrow on

## 2020-12-28 ENCOUNTER — HOSPITAL ENCOUNTER (OUTPATIENT)
Dept: GENERAL RADIOLOGY | Facility: HOSPITAL | Age: 60
Discharge: HOME | End: 2020-12-28
Attending: INTERNAL MEDICINE
Payer: COMMERCIAL

## 2020-12-28 DIAGNOSIS — M89.9 LYTIC LESION OF BONE ON X-RAY: ICD-10-CM

## 2020-12-28 DIAGNOSIS — Z85.79 HISTORY OF PLASMACYTOMA: ICD-10-CM

## 2020-12-28 DIAGNOSIS — C90.21 EXTRAMEDULLARY PLASMACYTOMA IN REMISSION (HCC): Primary | ICD-10-CM

## 2020-12-28 PROCEDURE — 77075 RADEX OSSEOUS SURVEY COMPL: CPT | Performed by: INTERNAL MEDICINE

## 2020-12-29 ENCOUNTER — OFFICE VISIT (OUTPATIENT)
Dept: HEMATOLOGY/ONCOLOGY | Facility: HOSPITAL | Age: 60
End: 2020-12-29
Attending: INTERNAL MEDICINE
Payer: COMMERCIAL

## 2020-12-29 VITALS
DIASTOLIC BLOOD PRESSURE: 84 MMHG | OXYGEN SATURATION: 97 % | WEIGHT: 249 LBS | BODY MASS INDEX: 33 KG/M2 | TEMPERATURE: 97 F | HEART RATE: 68 BPM | RESPIRATION RATE: 16 BRPM | SYSTOLIC BLOOD PRESSURE: 136 MMHG

## 2020-12-29 DIAGNOSIS — C90.00 MULTIPLE PLASMACYTOMA OF BONE (HCC): Primary | ICD-10-CM

## 2020-12-29 PROCEDURE — 88264 CHROMOSOME ANALYSIS 20-25: CPT | Performed by: INTERNAL MEDICINE

## 2020-12-29 PROCEDURE — 88237 TISSUE CULTURE BONE MARROW: CPT | Performed by: INTERNAL MEDICINE

## 2020-12-29 PROCEDURE — 88291 CYTO/MOLECULAR REPORT: CPT | Performed by: INTERNAL MEDICINE

## 2020-12-29 PROCEDURE — 38222 DX BONE MARROW BX & ASPIR: CPT | Performed by: INTERNAL MEDICINE

## 2020-12-29 NOTE — PROGRESS NOTES
Date of visit: 12/29/2020    Diagnosis:  Multiple plasmacytoma of bone (hcc)  (primary encounter diagnosis)    Narrative:  Linnell Severin presents for a bone marrow biopsy and aspirate.   I explained the procedure to the patient including the risk and be

## 2021-01-05 LAB
CD10 CELLS NFR SPEC: 4 %
CD11C CELLS NFR SPEC: 9 %
CD14 CELLS NFR SPEC: 1 %
CD19 CELLS NFR SPEC: 19 %
CD19/CD10 CELLS: 2 %
CD20 CELLS NFR SPEC: 19 %
CD22 CELLS NFR SPEC: 19 %
CD23 CELLS NFR SPEC: 14 %
CD3 CELLS NFR SPEC: 70 %
CD3+CD4+ CELLS NFR SPEC: 34 %
CD3+CD4+ CELLS/CD3+CD8+ CLL SPEC: 1.1
CD3+CD8+ CELLS NFR SPEC: 32 %
CD45 CELLS NFR SPEC: 100 %
CD45-/CD38+ KAPPA: 42 %
CD45-/CD38+ LAMBDA: 26 %
CD5 CELLS NFR SPEC: 70 %
CD5/CD19 CELLS: 2 %
CD56 CELLS NFR SPEC: 7 %
CD56 CELLS NFR SPEC: 8 %
CD7 CELLS NFR SPEC: 73 %
CELL SURF KAPPA/LAMBDA RATIO: 1.1
CELL SURF LAMBDA LIGHT CHAIN: 8 %
CELL SURFACE KAPPA LIGHT CHAIN: 9 %
FMC7 CELLS NFR SPEC: 15 %

## 2021-01-06 ENCOUNTER — TELEPHONE (OUTPATIENT)
Dept: HEMATOLOGY/ONCOLOGY | Facility: HOSPITAL | Age: 61
End: 2021-01-06

## 2021-01-06 DIAGNOSIS — C90.00 MULTIPLE PLASMACYTOMA OF BONE (HCC): Primary | ICD-10-CM

## 2021-01-06 NOTE — TELEPHONE ENCOUNTER
Left message with patient that bone marrow did not show increase in plasma cells. Technically has multiple plasmacytomas. Will arrange for core biopsy of distal tibia. Will need to do FISH on this and chromosomes if possible.

## 2021-01-06 NOTE — PROGRESS NOTES
Spoke with Dr Vinicius Hunter regarding tissue handling for biopsy. Will try to avoid decalcification if not necessary. Will ask that they be contacted by IR regarding tissue handling. Need to try to get FISH done for myeloma on the specimen.     NEHA Price

## 2021-01-06 NOTE — TELEPHONE ENCOUNTER
Patient going for IR guided biopsy of tibial lytic lesion.     See my note of December 18th:    PRIMARY CARE PHYSICIAN:  Dr. Elvin Shoemaker:  History of nasopharyngeal plasmacytoma, now with apparent lytic lesion in the right hand.      H or ankle, though he is not sure if this is only due to his anxiety that he now has based upon the changes that were seen in the hand. His weight is stable. His appetite is good. He has no fevers or chills, no sweats or night sweats.   He had played golf

## 2021-01-16 ENCOUNTER — LAB ENCOUNTER (OUTPATIENT)
Dept: LAB | Facility: HOSPITAL | Age: 61
End: 2021-01-16
Attending: INTERNAL MEDICINE
Payer: COMMERCIAL

## 2021-01-16 DIAGNOSIS — C90.00 MULTIPLE PLASMACYTOMA OF BONE (HCC): ICD-10-CM

## 2021-01-16 LAB — SARS-COV-2 RNA RESP QL NAA+PROBE: NOT DETECTED

## 2021-01-19 ENCOUNTER — NURSE ONLY (OUTPATIENT)
Dept: LAB | Facility: HOSPITAL | Age: 61
End: 2021-01-19
Attending: INTERNAL MEDICINE
Payer: COMMERCIAL

## 2021-01-19 ENCOUNTER — HOSPITAL ENCOUNTER (OUTPATIENT)
Dept: CT IMAGING | Facility: HOSPITAL | Age: 61
Discharge: HOME | End: 2021-01-19
Attending: INTERNAL MEDICINE
Payer: COMMERCIAL

## 2021-01-19 VITALS
SYSTOLIC BLOOD PRESSURE: 147 MMHG | HEIGHT: 72.5 IN | OXYGEN SATURATION: 100 % | BODY MASS INDEX: 31.48 KG/M2 | RESPIRATION RATE: 16 BRPM | TEMPERATURE: 98 F | HEART RATE: 69 BPM | DIASTOLIC BLOOD PRESSURE: 72 MMHG | WEIGHT: 235 LBS

## 2021-01-19 DIAGNOSIS — C90.00 MULTIPLE PLASMACYTOMA OF BONE (HCC): Primary | ICD-10-CM

## 2021-01-19 DIAGNOSIS — C90.00 MYELOMA (HCC): ICD-10-CM

## 2021-01-19 DIAGNOSIS — C90.00 MULTIPLE PLASMACYTOMA OF BONE (HCC): ICD-10-CM

## 2021-01-19 LAB
DEPRECATED RDW RBC AUTO: 39.8 FL (ref 35.1–46.3)
ERYTHROCYTE [DISTWIDTH] IN BLOOD BY AUTOMATED COUNT: 12.8 % (ref 11–15)
HCT VFR BLD AUTO: 45.4 %
HGB BLD-MCNC: 14.9 G/DL
INR BLD: 0.92 (ref 0.89–1.11)
MCH RBC QN AUTO: 28.5 PG (ref 26–34)
MCHC RBC AUTO-ENTMCNC: 32.8 G/DL (ref 31–37)
MCV RBC AUTO: 86.8 FL
PLATELET # BLD AUTO: 170 10(3)UL (ref 150–450)
PSA SERPL DL<=0.01 NG/ML-MCNC: 12.7 SECONDS (ref 12.4–14.6)
RBC # BLD AUTO: 5.23 X10(6)UL
WBC # BLD AUTO: 5.8 X10(3) UL (ref 4–11)

## 2021-01-19 PROCEDURE — 88307 TISSUE EXAM BY PATHOLOGIST: CPT | Performed by: INTERNAL MEDICINE

## 2021-01-19 PROCEDURE — 88365 INSITU HYBRIDIZATION (FISH): CPT | Performed by: INTERNAL MEDICINE

## 2021-01-19 PROCEDURE — 88271 CYTOGENETICS DNA PROBE: CPT | Performed by: INTERNAL MEDICINE

## 2021-01-19 PROCEDURE — 85610 PROTHROMBIN TIME: CPT

## 2021-01-19 PROCEDURE — 36415 COLL VENOUS BLD VENIPUNCTURE: CPT

## 2021-01-19 PROCEDURE — 88271 CYTOGENETICS DNA PROBE: CPT

## 2021-01-19 PROCEDURE — 88291 CYTO/MOLECULAR REPORT: CPT

## 2021-01-19 PROCEDURE — 99153 MOD SED SAME PHYS/QHP EA: CPT | Performed by: INTERNAL MEDICINE

## 2021-01-19 PROCEDURE — 99152 MOD SED SAME PHYS/QHP 5/>YRS: CPT | Performed by: INTERNAL MEDICINE

## 2021-01-19 PROCEDURE — 88275 CYTOGENETICS 100-300: CPT

## 2021-01-19 PROCEDURE — 85027 COMPLETE CBC AUTOMATED: CPT | Performed by: RADIOLOGY

## 2021-01-19 PROCEDURE — 77012 CT SCAN FOR NEEDLE BIOPSY: CPT | Performed by: INTERNAL MEDICINE

## 2021-01-19 PROCEDURE — 20225 BONE BIOPSY TROCAR/NDL DEEP: CPT | Performed by: INTERNAL MEDICINE

## 2021-01-19 RX ORDER — MIDAZOLAM HYDROCHLORIDE 1 MG/ML
1 INJECTION INTRAMUSCULAR; INTRAVENOUS EVERY 5 MIN PRN
Status: ACTIVE | OUTPATIENT
Start: 2021-01-19 | End: 2021-01-19

## 2021-01-19 RX ORDER — SODIUM CHLORIDE 9 MG/ML
INJECTION, SOLUTION INTRAVENOUS CONTINUOUS
Status: DISCONTINUED | OUTPATIENT
Start: 2021-01-19 | End: 2021-01-21

## 2021-01-19 RX ORDER — NALOXONE HYDROCHLORIDE 0.4 MG/ML
80 INJECTION, SOLUTION INTRAMUSCULAR; INTRAVENOUS; SUBCUTANEOUS AS NEEDED
Status: DISCONTINUED | OUTPATIENT
Start: 2021-01-19 | End: 2021-01-21

## 2021-01-19 RX ORDER — MIDAZOLAM HYDROCHLORIDE 1 MG/ML
INJECTION INTRAMUSCULAR; INTRAVENOUS
Status: COMPLETED
Start: 2021-01-19 | End: 2021-01-19

## 2021-01-19 RX ORDER — FLUMAZENIL 0.1 MG/ML
0.2 INJECTION, SOLUTION INTRAVENOUS AS NEEDED
Status: DISCONTINUED | OUTPATIENT
Start: 2021-01-19 | End: 2021-01-21

## 2021-01-19 RX ADMIN — SODIUM CHLORIDE: 9 INJECTION, SOLUTION INTRAVENOUS at 08:00:00

## 2021-01-19 RX ADMIN — MIDAZOLAM HYDROCHLORIDE 1 MG: 1 INJECTION INTRAMUSCULAR; INTRAVENOUS at 08:15:00

## 2021-01-19 RX ADMIN — MIDAZOLAM HYDROCHLORIDE 0.5 MG: 1 INJECTION INTRAMUSCULAR; INTRAVENOUS at 08:17:00

## 2021-01-19 NOTE — PROCEDURES
[facility]  Procedure Note    Alma Bragg Patient Status:  Outpatient    1960 MRN [id number]   [facility] CT Attending Mervat Patino MD   Hosp Day # 0 PCP Venkatesh Cooper MD     Procedure: CT guided L tibia biopsy    Pre-P

## 2021-01-19 NOTE — IMAGING NOTE
Dr. Isidro Pablo explained procedure to patient . Patient signed consent. Time out performed. Patient tolerated procedure well. Noted dressing  Dry and clean to left lower leg area. Patient able to move  himself back to bed with no assist.  Report to  [employer].

## 2021-01-19 NOTE — H&P
[facility] Patient Status:  Outpatient    1960 MRN [id number]   [facility] Attending Josh Doan MD   Hosp Day # 0 PCP Tony Motta MD     Admitting Diagnosis:   Myeloma    Histo or did someone tell you about it? I             don't know3.  How long after you took penicillin             did the reaction start? I don't KZNR3.  GOVF             happened when you took penicillin?  I don't             know5.  Since your reaction, have y biopsy L tibia    Ric Hutton MD  1/19/2021  8:49 AM

## 2021-01-27 ENCOUNTER — TELEPHONE (OUTPATIENT)
Dept: HEMATOLOGY/ONCOLOGY | Facility: HOSPITAL | Age: 61
End: 2021-01-27

## 2021-01-27 NOTE — TELEPHONE ENCOUNTER
Spoke to patient. Saw Dr Sara Wilburn. Getting 24 hour urine for BJP. Will plan to start RVD. Will come to [facility] to see me tomorrow to finalize plans.   Sergio Mazariegos MD

## 2021-01-28 ENCOUNTER — OFFICE VISIT (OUTPATIENT)
Dept: HEMATOLOGY/ONCOLOGY | Age: 61
End: 2021-01-28
Attending: INTERNAL MEDICINE
Payer: COMMERCIAL

## 2021-01-28 VITALS
RESPIRATION RATE: 18 BRPM | OXYGEN SATURATION: 95 % | BODY MASS INDEX: 33 KG/M2 | DIASTOLIC BLOOD PRESSURE: 83 MMHG | WEIGHT: 247.81 LBS | SYSTOLIC BLOOD PRESSURE: 128 MMHG | HEART RATE: 96 BPM | TEMPERATURE: 97 F

## 2021-01-28 DIAGNOSIS — C90.00 MULTIPLE MYELOMA NOT HAVING ACHIEVED REMISSION (HCC): Primary | ICD-10-CM

## 2021-01-28 PROCEDURE — 99215 OFFICE O/P EST HI 40 MIN: CPT | Performed by: INTERNAL MEDICINE

## 2021-01-28 RX ORDER — DEXAMETHASONE 4 MG/1
20 TABLET ORAL
Qty: 40 TABLET | Refills: 3 | Status: SHIPPED | OUTPATIENT
Start: 2021-01-28 | End: 2021-12-17

## 2021-01-28 RX ORDER — ACYCLOVIR 200 MG/1
400 CAPSULE ORAL 2 TIMES DAILY
Qty: 60 CAPSULE | Refills: 3 | Status: SHIPPED | OUTPATIENT
Start: 2021-01-28 | End: 2021-04-09

## 2021-01-28 RX ORDER — PROCHLORPERAZINE MALEATE 10 MG
10 TABLET ORAL EVERY 6 HOURS PRN
Qty: 30 TABLET | Refills: 3 | Status: SHIPPED | OUTPATIENT
Start: 2021-01-28 | End: 2021-04-16

## 2021-01-28 RX ORDER — ONDANSETRON HYDROCHLORIDE 8 MG/1
8 TABLET, FILM COATED ORAL EVERY 8 HOURS PRN
Qty: 30 TABLET | Refills: 3 | Status: SHIPPED | OUTPATIENT
Start: 2021-01-28 | End: 2021-04-16

## 2021-01-28 NOTE — PROGRESS NOTES
Patient is here for MD f/u for multiple myeloma. Patient had a bone biopsy of left tibia on 1/19. Patient is here to discuss these results and POC. Patient reports some mild discomfort at biopsy site. He denies any other complaints.        Education Record

## 2021-01-29 RX ORDER — ZOLEDRONIC ACID 5 MG/100ML
5 INJECTION, SOLUTION INTRAVENOUS ONCE
Status: CANCELLED | OUTPATIENT
Start: 2021-02-05

## 2021-01-29 NOTE — PROGRESS NOTES
[facility]    PATIENT'S NAME: Travis Arrieta   ATTENDING PHYSICIAN: Esme Morales M.D.    PATIENT ACCOUNT #: [de-identified] LOCATION: [address] [place] [address] RECORD #: [id number] YOB: 1960   DATE OF SERVICE: 01/28/2021       CANCER CE whom he had seen for a consultation regarding myeloma and transplantation. He returns today to finalize his induction plans. He has multiple lytic lesions and therefore has end-organ disease but he has only stage 1 disease.   We did perform a biopsy of a cells.  He does not have significantly bad FISH characteristics; however, he has a very unusual pattern of these lytic lesions and plasma cytomas occurring in his peripheral skeleton. He clearly needs therapy. He is clearly a transplant candidate.   He wi

## 2021-02-05 ENCOUNTER — TELEPHONE (OUTPATIENT)
Dept: HEMATOLOGY/ONCOLOGY | Age: 61
End: 2021-02-05

## 2021-02-05 NOTE — TELEPHONE ENCOUNTER
Nuris from [facility] called and would like speak to the nurse regarding the patient's Revlimid. It is a transfer so it must be verified.    Please call Nuris at: [phone]    Reference No as follows: [id number]    Thank you!! Farida Chi

## 2021-02-05 NOTE — TELEPHONE ENCOUNTER
Spoke with Angel At [address] [place] [address]. Informed them, Rx was sent on 2/2. She confirmed Rx was on file and it will be shipped to the patient on 2/9.

## 2021-02-08 ENCOUNTER — TELEPHONE (OUTPATIENT)
Dept: HEMATOLOGY/ONCOLOGY | Facility: HOSPITAL | Age: 61
End: 2021-02-08

## 2021-02-08 NOTE — TELEPHONE ENCOUNTER
LVM~He needs a chemo ed this week not same day as tx and also needs to start treatment on Friday tx is 1 hour.  I could see him on day 8 if the NP sees him this week.  Getting RVD and Vikram Davis is getting his lenalidomide on Wednesday

## 2021-02-08 NOTE — TELEPHONE ENCOUNTER
Kamlesh Bedoya,  Pt returrned your call to schedule appt, please call. LVM~He needs a chemo ed this week not same day as tx and also needs to start treatment on Friday tx is 1 hour.  I could see him on day 8 if the NP sees him this week.  Getting RVD and Zometa.

## 2021-02-09 ENCOUNTER — OFFICE VISIT (OUTPATIENT)
Dept: HEMATOLOGY/ONCOLOGY | Facility: HOSPITAL | Age: 61
End: 2021-02-09
Attending: INTERNAL MEDICINE
Payer: COMMERCIAL

## 2021-02-09 DIAGNOSIS — C90.00 MULTIPLE MYELOMA NOT HAVING ACHIEVED REMISSION (HCC): Primary | ICD-10-CM

## 2021-02-09 DIAGNOSIS — Z71.89 OTHER SPECIFIED COUNSELING: ICD-10-CM

## 2021-02-09 PROCEDURE — 99417 PROLNG OP E/M EACH 15 MIN: CPT | Performed by: CLINICAL NURSE SPECIALIST

## 2021-02-09 PROCEDURE — 99215 OFFICE O/P EST HI 40 MIN: CPT | Performed by: CLINICAL NURSE SPECIALIST

## 2021-02-09 NOTE — PROGRESS NOTES
Chemotherapy Education    Patient:Tyshawn Butler     Date: 2/9/2021   Diagnosis:  Multiple myeloma    Caregivers present: No    Drug names:  Revlimid, Velcade, Dexamethasone    Treatment Effects on Bone Marrow:  Chemotherapy action on cancer / normal ce should skip dose for that day and resume usual dosing the following day. Patient should not take 2 doses to make up for a missed dose.     What Phone Number to Call:   Medical Oncologist /  After Hours / Weekend Number For On-Call Physician:  [phone]

## 2021-02-12 ENCOUNTER — OFFICE VISIT (OUTPATIENT)
Dept: HEMATOLOGY/ONCOLOGY | Facility: HOSPITAL | Age: 61
End: 2021-02-12
Attending: INTERNAL MEDICINE
Payer: COMMERCIAL

## 2021-02-12 ENCOUNTER — SOCIAL WORK SERVICES (OUTPATIENT)
Dept: HEMATOLOGY/ONCOLOGY | Facility: HOSPITAL | Age: 61
End: 2021-02-12

## 2021-02-12 VITALS
TEMPERATURE: 97 F | HEIGHT: 71.69 IN | BODY MASS INDEX: 33.55 KG/M2 | HEART RATE: 77 BPM | RESPIRATION RATE: 16 BRPM | DIASTOLIC BLOOD PRESSURE: 84 MMHG | SYSTOLIC BLOOD PRESSURE: 139 MMHG | OXYGEN SATURATION: 97 % | WEIGHT: 245 LBS

## 2021-02-12 DIAGNOSIS — C90.00 MULTIPLE MYELOMA NOT HAVING ACHIEVED REMISSION (HCC): Primary | ICD-10-CM

## 2021-02-12 LAB
ALBUMIN SERPL-MCNC: 4 G/DL (ref 3.4–5)
ALBUMIN/GLOB SERPL: 1.3 {RATIO} (ref 1–2)
ALP LIVER SERPL-CCNC: 69 U/L
ALT SERPL-CCNC: 29 U/L
ANION GAP SERPL CALC-SCNC: 8 MMOL/L (ref 0–18)
AST SERPL-CCNC: 19 U/L (ref 15–37)
BASOPHILS # BLD AUTO: 0.04 X10(3) UL (ref 0–0.2)
BASOPHILS NFR BLD AUTO: 0.7 %
BILIRUB SERPL-MCNC: 0.8 MG/DL (ref 0.1–2)
BUN BLD-MCNC: 12 MG/DL (ref 7–18)
BUN/CREAT SERPL: 12.2 (ref 10–20)
CALCIUM BLD-MCNC: 9 MG/DL (ref 8.5–10.1)
CHLORIDE SERPL-SCNC: 107 MMOL/L (ref 98–112)
CO2 SERPL-SCNC: 24 MMOL/L (ref 21–32)
CREAT BLD-MCNC: 0.98 MG/DL
DEPRECATED RDW RBC AUTO: 40.9 FL (ref 35.1–46.3)
EOSINOPHIL # BLD AUTO: 0.02 X10(3) UL (ref 0–0.7)
EOSINOPHIL NFR BLD AUTO: 0.3 %
ERYTHROCYTE [DISTWIDTH] IN BLOOD BY AUTOMATED COUNT: 13.1 % (ref 11–15)
GLOBULIN PLAS-MCNC: 3.2 G/DL (ref 2.8–4.4)
GLUCOSE BLD-MCNC: 134 MG/DL (ref 70–99)
HCT VFR BLD AUTO: 46.6 %
HGB BLD-MCNC: 15.5 G/DL
IMM GRANULOCYTES # BLD AUTO: 0.04 X10(3) UL (ref 0–1)
IMM GRANULOCYTES NFR BLD: 0.7 %
LYMPHOCYTES # BLD AUTO: 0.58 X10(3) UL (ref 1–4)
LYMPHOCYTES NFR BLD AUTO: 9.9 %
M PROTEIN MFR SERPL ELPH: 7.2 G/DL (ref 6.4–8.2)
MCH RBC QN AUTO: 29 PG (ref 26–34)
MCHC RBC AUTO-ENTMCNC: 33.3 G/DL (ref 31–37)
MCV RBC AUTO: 87.3 FL
MONOCYTES # BLD AUTO: 0.08 X10(3) UL (ref 0.1–1)
MONOCYTES NFR BLD AUTO: 1.4 %
NEUTROPHILS # BLD AUTO: 5.12 X10 (3) UL (ref 1.5–7.7)
NEUTROPHILS # BLD AUTO: 5.12 X10(3) UL (ref 1.5–7.7)
NEUTROPHILS NFR BLD AUTO: 87 %
OSMOLALITY SERPL CALC.SUM OF ELEC: 290 MOSM/KG (ref 275–295)
PLATELET # BLD AUTO: 253 10(3)UL (ref 150–450)
POTASSIUM SERPL-SCNC: 4 MMOL/L (ref 3.5–5.1)
RBC # BLD AUTO: 5.34 X10(6)UL
SODIUM SERPL-SCNC: 139 MMOL/L (ref 136–145)
WBC # BLD AUTO: 5.9 X10(3) UL (ref 4–11)

## 2021-02-12 PROCEDURE — 96401 CHEMO ANTI-NEOPL SQ/IM: CPT

## 2021-02-12 PROCEDURE — 96374 THER/PROPH/DIAG INJ IV PUSH: CPT

## 2021-02-12 PROCEDURE — 85025 COMPLETE CBC W/AUTO DIFF WBC: CPT

## 2021-02-12 PROCEDURE — 80053 COMPREHEN METABOLIC PANEL: CPT

## 2021-02-12 NOTE — PROGRESS NOTES
Pt here for C1D1 Velcade injection.   Arrives Ambulating independently, accompanied by Spouse           Patient reports possible pregnancy since last therapy cycle: Not Applicable    Modifications in dose or schedule: No     Frequency of blood return and si

## 2021-02-12 NOTE — PROGRESS NOTES
met with Patient and Wife Tahira Wynne in treatment area. Patient appears to be pleasant and “full of jokes” but does admit that he is “anxious, nervous, scared” for starting treatment due to the unknown and potential next steps.   allowed

## 2021-02-15 ENCOUNTER — OFFICE VISIT (OUTPATIENT)
Dept: HEMATOLOGY/ONCOLOGY | Facility: HOSPITAL | Age: 61
End: 2021-02-15
Attending: INTERNAL MEDICINE
Payer: COMMERCIAL

## 2021-02-15 VITALS
OXYGEN SATURATION: 98 % | RESPIRATION RATE: 18 BRPM | DIASTOLIC BLOOD PRESSURE: 80 MMHG | WEIGHT: 245 LBS | HEART RATE: 95 BPM | HEIGHT: 71.69 IN | BODY MASS INDEX: 33.55 KG/M2 | SYSTOLIC BLOOD PRESSURE: 130 MMHG | TEMPERATURE: 99 F

## 2021-02-15 DIAGNOSIS — C90.00 MULTIPLE MYELOMA NOT HAVING ACHIEVED REMISSION (HCC): Primary | ICD-10-CM

## 2021-02-15 PROCEDURE — 96401 CHEMO ANTI-NEOPL SQ/IM: CPT

## 2021-02-15 NOTE — PROGRESS NOTES
Pt here for C1D4.   Arrives Ambulating independently, accompanied by Self           Patient reports possible pregnancy since last therapy cycle: Not Applicable    Modifications in dose or schedule: No     Frequency of blood return and site check throughout

## 2021-02-17 ENCOUNTER — ORDER TRANSCRIPTION (OUTPATIENT)
Dept: ADMINISTRATIVE | Facility: HOSPITAL | Age: 61
End: 2021-02-17

## 2021-02-17 DIAGNOSIS — Z01.812 PRE-PROCEDURE LAB EXAM: ICD-10-CM

## 2021-02-17 DIAGNOSIS — Z20.822 ENCOUNTER FOR LABORATORY TESTING FOR COVID-19 VIRUS: Primary | ICD-10-CM

## 2021-02-19 ENCOUNTER — OFFICE VISIT (OUTPATIENT)
Dept: HEMATOLOGY/ONCOLOGY | Facility: HOSPITAL | Age: 61
End: 2021-02-19
Attending: INTERNAL MEDICINE
Payer: COMMERCIAL

## 2021-02-19 VITALS
HEIGHT: 71.69 IN | OXYGEN SATURATION: 95 % | SYSTOLIC BLOOD PRESSURE: 154 MMHG | HEART RATE: 92 BPM | DIASTOLIC BLOOD PRESSURE: 80 MMHG | BODY MASS INDEX: 33.85 KG/M2 | TEMPERATURE: 98 F | WEIGHT: 247.19 LBS | RESPIRATION RATE: 16 BRPM

## 2021-02-19 DIAGNOSIS — C90.00 MULTIPLE MYELOMA NOT HAVING ACHIEVED REMISSION (HCC): Primary | ICD-10-CM

## 2021-02-19 DIAGNOSIS — C90.21 EXTRAMEDULLARY PLASMACYTOMA IN REMISSION (HCC): ICD-10-CM

## 2021-02-19 LAB
ALBUMIN SERPL-MCNC: 3.4 G/DL (ref 3.4–5)
ALBUMIN/GLOB SERPL: 1 {RATIO} (ref 1–2)
ALP LIVER SERPL-CCNC: 76 U/L
ALT SERPL-CCNC: 27 U/L
ANION GAP SERPL CALC-SCNC: 5 MMOL/L (ref 0–18)
AST SERPL-CCNC: 17 U/L (ref 15–37)
BASOPHILS # BLD AUTO: 0.02 X10(3) UL (ref 0–0.2)
BASOPHILS NFR BLD AUTO: 0.3 %
BILIRUB SERPL-MCNC: 0.9 MG/DL (ref 0.1–2)
BUN BLD-MCNC: 13 MG/DL (ref 7–18)
BUN/CREAT SERPL: 12.6 (ref 10–20)
CALCIUM BLD-MCNC: 8.6 MG/DL (ref 8.5–10.1)
CHLORIDE SERPL-SCNC: 106 MMOL/L (ref 98–112)
CO2 SERPL-SCNC: 25 MMOL/L (ref 21–32)
CREAT BLD-MCNC: 1.03 MG/DL
DEPRECATED RDW RBC AUTO: 39.4 FL (ref 35.1–46.3)
EOSINOPHIL # BLD AUTO: 0.01 X10(3) UL (ref 0–0.7)
EOSINOPHIL NFR BLD AUTO: 0.2 %
ERYTHROCYTE [DISTWIDTH] IN BLOOD BY AUTOMATED COUNT: 12.8 % (ref 11–15)
GLOBULIN PLAS-MCNC: 3.3 G/DL (ref 2.8–4.4)
GLUCOSE BLD-MCNC: 174 MG/DL (ref 70–99)
HCT VFR BLD AUTO: 42.2 %
HGB BLD-MCNC: 14.1 G/DL
IGA SERPL-MCNC: 142 MG/DL (ref 70–312)
IGM SERPL-MCNC: 34.9 MG/DL (ref 43–279)
IMM GRANULOCYTES # BLD AUTO: 0.1 X10(3) UL (ref 0–1)
IMM GRANULOCYTES NFR BLD: 1.7 %
IMMUNOGLOBULIN PNL SER-MCNC: 697 MG/DL (ref 791–1643)
LDH SERPL L TO P-CCNC: 182 U/L
LYMPHOCYTES # BLD AUTO: 0.37 X10(3) UL (ref 1–4)
LYMPHOCYTES NFR BLD AUTO: 6.2 %
M PROTEIN MFR SERPL ELPH: 6.7 G/DL (ref 6.4–8.2)
MCH RBC QN AUTO: 28.4 PG (ref 26–34)
MCHC RBC AUTO-ENTMCNC: 33.4 G/DL (ref 31–37)
MCV RBC AUTO: 84.9 FL
MONOCYTES # BLD AUTO: 0.06 X10(3) UL (ref 0.1–1)
MONOCYTES NFR BLD AUTO: 1 %
NEUTROPHILS # BLD AUTO: 5.43 X10 (3) UL (ref 1.5–7.7)
NEUTROPHILS # BLD AUTO: 5.43 X10(3) UL (ref 1.5–7.7)
NEUTROPHILS NFR BLD AUTO: 90.6 %
OSMOLALITY SERPL CALC.SUM OF ELEC: 286 MOSM/KG (ref 275–295)
PATIENT FASTING Y/N/NP: NO
PLATELET # BLD AUTO: 239 10(3)UL (ref 150–450)
POTASSIUM SERPL-SCNC: 4.1 MMOL/L (ref 3.5–5.1)
RBC # BLD AUTO: 4.97 X10(6)UL
SODIUM SERPL-SCNC: 136 MMOL/L (ref 136–145)
WBC # BLD AUTO: 6 X10(3) UL (ref 4–11)

## 2021-02-19 PROCEDURE — 96401 CHEMO ANTI-NEOPL SQ/IM: CPT

## 2021-02-19 PROCEDURE — 99214 OFFICE O/P EST MOD 30 MIN: CPT | Performed by: INTERNAL MEDICINE

## 2021-02-19 NOTE — PROGRESS NOTES
Patient is here for MD f/sasha and C1D8 of Revlimid, Velcade, Dex. Patient is tolerating tx so far. Patient is not sleeping well at night. Appetite is good. Energy level is good. Denies pain at present time.        Education Record    Learner:  Patient    Gem Madrigal

## 2021-02-21 NOTE — PROGRESS NOTES
[facility]    PATIENT'S NAME: Tip Molina   ATTENDING PHYSICIAN: Jacy Olivo M.D.    PATIENT ACCOUNT #: [de-identified] LOCATION: [address] [place] [address] RECORD #: [id number] YOB: 1960   DATE OF SERVICE: 02/19/2021       CANCER CE day or so, but he recovered. His energy level is good. He denies any pain. He is trying to go back to physical activity and wants to go swimming, and I told him I thought that that would be fine.      MEDICATIONS:  His current medications include acyclov

## 2021-02-22 ENCOUNTER — OFFICE VISIT (OUTPATIENT)
Dept: HEMATOLOGY/ONCOLOGY | Facility: HOSPITAL | Age: 61
End: 2021-02-22
Attending: INTERNAL MEDICINE
Payer: COMMERCIAL

## 2021-02-22 VITALS
RESPIRATION RATE: 16 BRPM | DIASTOLIC BLOOD PRESSURE: 77 MMHG | OXYGEN SATURATION: 98 % | TEMPERATURE: 98 F | HEIGHT: 71.69 IN | SYSTOLIC BLOOD PRESSURE: 163 MMHG | WEIGHT: 252.38 LBS | HEART RATE: 85 BPM | BODY MASS INDEX: 34.56 KG/M2

## 2021-02-22 DIAGNOSIS — C90.00 MULTIPLE MYELOMA NOT HAVING ACHIEVED REMISSION (HCC): Primary | ICD-10-CM

## 2021-02-22 PROCEDURE — 96401 CHEMO ANTI-NEOPL SQ/IM: CPT

## 2021-02-22 NOTE — PROGRESS NOTES
.Education Record    Learner:  Patient    Disease / Diagnosis: MM    Barriers / Limitations:  None   Comments:    Method:  Brief focused   Comments:    General Topics:  Plan of care reviewed   Comments:    Outcome:  Shows understanding   Comments: Velcade

## 2021-02-23 DIAGNOSIS — C90.00 MULTIPLE MYELOMA NOT HAVING ACHIEVED REMISSION (HCC): ICD-10-CM

## 2021-03-01 LAB
ALBUMIN SERPL ELPH-MCNC: 3.89 G/DL (ref 3.75–5.21)
ALBUMIN/GLOB SERPL: 1.44 {RATIO} (ref 1–2)
ALPHA1 GLOB SERPL ELPH-MCNC: 0.4 G/DL (ref 0.19–0.46)
ALPHA2 GLOB SERPL ELPH-MCNC: 0.88 G/DL (ref 0.48–1.05)
B-GLOBULIN SERPL ELPH-MCNC: 0.71 G/DL (ref 0.68–1.23)
GAMMA GLOB SERPL ELPH-MCNC: 0.71 G/DL (ref 0.62–1.7)
KAPPA LC FREE SER-MCNC: 2.22 MG/DL (ref 0.33–1.94)
KAPPA LC FREE/LAMBDA FREE SER NEPH: 0.36 {RATIO} (ref 0.26–1.65)
LAMBDA LC FREE SERPL-MCNC: 6.13 MG/DL (ref 0.57–2.63)
M PROTEIN MFR SERPL ELPH: 6.6 G/DL (ref 6.4–8.2)

## 2021-03-05 ENCOUNTER — OFFICE VISIT (OUTPATIENT)
Dept: HEMATOLOGY/ONCOLOGY | Facility: HOSPITAL | Age: 61
End: 2021-03-05
Attending: INTERNAL MEDICINE
Payer: COMMERCIAL

## 2021-03-05 VITALS
WEIGHT: 246.19 LBS | DIASTOLIC BLOOD PRESSURE: 82 MMHG | SYSTOLIC BLOOD PRESSURE: 142 MMHG | TEMPERATURE: 97 F | HEART RATE: 84 BPM | OXYGEN SATURATION: 97 % | BODY MASS INDEX: 33.71 KG/M2 | RESPIRATION RATE: 18 BRPM | HEIGHT: 71.69 IN

## 2021-03-05 DIAGNOSIS — C90.00 MULTIPLE MYELOMA NOT HAVING ACHIEVED REMISSION (HCC): Primary | ICD-10-CM

## 2021-03-05 LAB
ALBUMIN SERPL-MCNC: 3.5 G/DL (ref 3.4–5)
ALBUMIN/GLOB SERPL: 0.9 {RATIO} (ref 1–2)
ALP LIVER SERPL-CCNC: 83 U/L
ALT SERPL-CCNC: 32 U/L
ANION GAP SERPL CALC-SCNC: 6 MMOL/L (ref 0–18)
AST SERPL-CCNC: 8 U/L (ref 15–37)
BASOPHILS # BLD AUTO: 0.02 X10(3) UL (ref 0–0.2)
BASOPHILS NFR BLD AUTO: 0.4 %
BILIRUB SERPL-MCNC: 0.5 MG/DL (ref 0.1–2)
BUN BLD-MCNC: 14 MG/DL (ref 7–18)
BUN/CREAT SERPL: 13.1 (ref 10–20)
CALCIUM BLD-MCNC: 9.1 MG/DL (ref 8.5–10.1)
CHLORIDE SERPL-SCNC: 107 MMOL/L (ref 98–112)
CO2 SERPL-SCNC: 24 MMOL/L (ref 21–32)
CREAT BLD-MCNC: 1.07 MG/DL
DEPRECATED RDW RBC AUTO: 38.9 FL (ref 35.1–46.3)
EOSINOPHIL # BLD AUTO: 0 X10(3) UL (ref 0–0.7)
EOSINOPHIL NFR BLD AUTO: 0 %
ERYTHROCYTE [DISTWIDTH] IN BLOOD BY AUTOMATED COUNT: 12.7 % (ref 11–15)
GLOBULIN PLAS-MCNC: 3.7 G/DL (ref 2.8–4.4)
GLUCOSE BLD-MCNC: 145 MG/DL (ref 70–99)
HCT VFR BLD AUTO: 43.4 %
HGB BLD-MCNC: 14.5 G/DL
IMM GRANULOCYTES # BLD AUTO: 0.05 X10(3) UL (ref 0–1)
IMM GRANULOCYTES NFR BLD: 1 %
LYMPHOCYTES # BLD AUTO: 0.36 X10(3) UL (ref 1–4)
LYMPHOCYTES NFR BLD AUTO: 7.4 %
M PROTEIN MFR SERPL ELPH: 7.2 G/DL (ref 6.4–8.2)
MCH RBC QN AUTO: 28.3 PG (ref 26–34)
MCHC RBC AUTO-ENTMCNC: 33.4 G/DL (ref 31–37)
MCV RBC AUTO: 84.8 FL
MONOCYTES # BLD AUTO: 0.05 X10(3) UL (ref 0.1–1)
MONOCYTES NFR BLD AUTO: 1 %
NEUTROPHILS # BLD AUTO: 4.39 X10 (3) UL (ref 1.5–7.7)
NEUTROPHILS # BLD AUTO: 4.39 X10(3) UL (ref 1.5–7.7)
NEUTROPHILS NFR BLD AUTO: 90.2 %
OSMOLALITY SERPL CALC.SUM OF ELEC: 287 MOSM/KG (ref 275–295)
PATIENT FASTING Y/N/NP: NO
PLATELET # BLD AUTO: 409 10(3)UL (ref 150–450)
POTASSIUM SERPL-SCNC: 4.1 MMOL/L (ref 3.5–5.1)
RBC # BLD AUTO: 5.12 X10(6)UL
SODIUM SERPL-SCNC: 137 MMOL/L (ref 136–145)
WBC # BLD AUTO: 4.9 X10(3) UL (ref 4–11)

## 2021-03-05 PROCEDURE — 96401 CHEMO ANTI-NEOPL SQ/IM: CPT

## 2021-03-05 PROCEDURE — 99214 OFFICE O/P EST MOD 30 MIN: CPT | Performed by: INTERNAL MEDICINE

## 2021-03-05 NOTE — PROGRESS NOTES
Pt here for C2D1 velcade inj.   Arrives Ambulating independently, accompanied by Self           Patient reports possible pregnancy since last therapy cycle: Not Applicable    Modifications in dose or schedule: No     Frequency of blood return and site check

## 2021-03-05 NOTE — PROGRESS NOTES
Patient is here for MD f/u and cycle 2 of Velcade and Revlimid. Patient reports he has been tolerating well. Appetite is good. Energy level has been good. Denies pain. No complaints. Due for Zometa next week.        Education Record    Learner:  Patient and

## 2021-03-07 NOTE — PROGRESS NOTES
[facility]    PATIENT'S NAME: Jules Rahman   ATTENDING PHYSICIAN: Nhi Vasques M.D.    PATIENT ACCOUNT #: [de-identified] LOCATION: [address] [place] [address] RECORD #: [id number] YOB: 1960   DATE OF SERVICE: 03/05/2021       CANCER CE dexamethasone 20 mg weekly, lenalidomide 25 mg daily, lorazepam 1 mg prior to imaging, valsartan/hydrochlorothiazide 320/12.5 daily. PHYSICAL EXAMINATION:    GENERAL:  He is well-appearing male in no acute distress.   VITAL SIGNS:  His performance status

## 2021-03-08 ENCOUNTER — OFFICE VISIT (OUTPATIENT)
Dept: HEMATOLOGY/ONCOLOGY | Facility: HOSPITAL | Age: 61
End: 2021-03-08
Attending: INTERNAL MEDICINE
Payer: COMMERCIAL

## 2021-03-08 VITALS
BODY MASS INDEX: 34.35 KG/M2 | HEIGHT: 71.69 IN | TEMPERATURE: 98 F | DIASTOLIC BLOOD PRESSURE: 84 MMHG | OXYGEN SATURATION: 98 % | RESPIRATION RATE: 16 BRPM | SYSTOLIC BLOOD PRESSURE: 125 MMHG | HEART RATE: 73 BPM | WEIGHT: 250.81 LBS

## 2021-03-08 DIAGNOSIS — C90.00 MULTIPLE MYELOMA NOT HAVING ACHIEVED REMISSION (HCC): Primary | ICD-10-CM

## 2021-03-08 PROCEDURE — 96401 CHEMO ANTI-NEOPL SQ/IM: CPT

## 2021-03-08 NOTE — PROGRESS NOTES
Pt here for C2D4 Velcade.   Arrives Ambulating independently, accompanied by Self           Patient reports possible pregnancy since last therapy cycle: Not Applicable    Modifications in dose or schedule: No     Frequency of blood return and site check thr

## 2021-03-12 ENCOUNTER — OFFICE VISIT (OUTPATIENT)
Dept: HEMATOLOGY/ONCOLOGY | Facility: HOSPITAL | Age: 61
End: 2021-03-12
Attending: INTERNAL MEDICINE
Payer: COMMERCIAL

## 2021-03-12 VITALS
WEIGHT: 248 LBS | SYSTOLIC BLOOD PRESSURE: 134 MMHG | HEIGHT: 71.69 IN | TEMPERATURE: 98 F | OXYGEN SATURATION: 94 % | HEART RATE: 95 BPM | BODY MASS INDEX: 33.96 KG/M2 | DIASTOLIC BLOOD PRESSURE: 81 MMHG | RESPIRATION RATE: 16 BRPM

## 2021-03-12 DIAGNOSIS — C90.00 MULTIPLE MYELOMA NOT HAVING ACHIEVED REMISSION (HCC): Primary | ICD-10-CM

## 2021-03-12 LAB
ALBUMIN SERPL-MCNC: 3.5 G/DL (ref 3.4–5)
ALBUMIN/GLOB SERPL: 1 {RATIO} (ref 1–2)
ALP LIVER SERPL-CCNC: 76 U/L
ALT SERPL-CCNC: 29 U/L
ANION GAP SERPL CALC-SCNC: 5 MMOL/L (ref 0–18)
AST SERPL-CCNC: 13 U/L (ref 15–37)
BASOPHILS # BLD: 0 X10(3) UL (ref 0–0.2)
BASOPHILS NFR BLD: 0 %
BILIRUB SERPL-MCNC: 0.5 MG/DL (ref 0.1–2)
BUN BLD-MCNC: 14 MG/DL (ref 7–18)
BUN/CREAT SERPL: 15.4 (ref 10–20)
CALCIUM BLD-MCNC: 8.9 MG/DL (ref 8.5–10.1)
CHLORIDE SERPL-SCNC: 106 MMOL/L (ref 98–112)
CO2 SERPL-SCNC: 26 MMOL/L (ref 21–32)
CREAT BLD-MCNC: 0.91 MG/DL
DEPRECATED RDW RBC AUTO: 40.5 FL (ref 35.1–46.3)
EOSINOPHIL # BLD: 0 X10(3) UL (ref 0–0.7)
EOSINOPHIL NFR BLD: 0 %
ERYTHROCYTE [DISTWIDTH] IN BLOOD BY AUTOMATED COUNT: 13.3 % (ref 11–15)
GLOBULIN PLAS-MCNC: 3.4 G/DL (ref 2.8–4.4)
GLUCOSE BLD-MCNC: 142 MG/DL (ref 70–99)
HCT VFR BLD AUTO: 42.2 %
HGB BLD-MCNC: 14.2 G/DL
LYMPHOCYTES NFR BLD: 0.25 X10(3) UL (ref 1–4)
LYMPHOCYTES NFR BLD: 4 %
M PROTEIN MFR SERPL ELPH: 6.9 G/DL (ref 6.4–8.2)
MCH RBC QN AUTO: 28.5 PG (ref 26–34)
MCHC RBC AUTO-ENTMCNC: 33.6 G/DL (ref 31–37)
MCV RBC AUTO: 84.6 FL
METAMYELOCYTES # BLD: 0.06 X10(3) UL
METAMYELOCYTES NFR BLD: 1 %
MONOCYTES # BLD: 0.06 X10(3) UL (ref 0.1–1)
MONOCYTES NFR BLD: 1 %
MORPHOLOGY: NORMAL
NEUTROPHILS # BLD AUTO: 5.38 X10 (3) UL (ref 1.5–7.7)
NEUTROPHILS NFR BLD: 88 %
NEUTS BAND NFR BLD: 6 %
NEUTS HYPERSEG # BLD: 5.92 X10(3) UL (ref 1.5–7.7)
OSMOLALITY SERPL CALC.SUM OF ELEC: 287 MOSM/KG (ref 275–295)
PLATELET # BLD AUTO: 244 10(3)UL (ref 150–450)
PLATELET MORPHOLOGY: NORMAL
POTASSIUM SERPL-SCNC: 3.9 MMOL/L (ref 3.5–5.1)
RBC # BLD AUTO: 4.99 X10(6)UL
SODIUM SERPL-SCNC: 137 MMOL/L (ref 136–145)
TOTAL CELLS COUNTED: 100
WBC # BLD AUTO: 6.3 X10(3) UL (ref 4–11)

## 2021-03-12 PROCEDURE — 96401 CHEMO ANTI-NEOPL SQ/IM: CPT

## 2021-03-12 PROCEDURE — 84165 PROTEIN E-PHORESIS SERUM: CPT

## 2021-03-12 PROCEDURE — 86334 IMMUNOFIX E-PHORESIS SERUM: CPT

## 2021-03-12 PROCEDURE — 85025 COMPLETE CBC W/AUTO DIFF WBC: CPT

## 2021-03-12 PROCEDURE — 83883 ASSAY NEPHELOMETRY NOT SPEC: CPT

## 2021-03-12 PROCEDURE — 85007 BL SMEAR W/DIFF WBC COUNT: CPT

## 2021-03-12 PROCEDURE — 85027 COMPLETE CBC AUTOMATED: CPT

## 2021-03-12 PROCEDURE — 80053 COMPREHEN METABOLIC PANEL: CPT

## 2021-03-12 PROCEDURE — 96374 THER/PROPH/DIAG INJ IV PUSH: CPT

## 2021-03-12 NOTE — PROGRESS NOTES
Pt here for C2D8 velcade, with zometa.   Arrives Ambulating independently, accompanied by Self           Pregnancy screening: Not applicable    Modifications in dose or schedule: Yes     Frequency of blood return and site check throughout administration: Pr

## 2021-03-15 ENCOUNTER — OFFICE VISIT (OUTPATIENT)
Dept: HEMATOLOGY/ONCOLOGY | Facility: HOSPITAL | Age: 61
End: 2021-03-15
Attending: INTERNAL MEDICINE
Payer: COMMERCIAL

## 2021-03-15 VITALS
TEMPERATURE: 97 F | SYSTOLIC BLOOD PRESSURE: 147 MMHG | HEART RATE: 69 BPM | RESPIRATION RATE: 18 BRPM | DIASTOLIC BLOOD PRESSURE: 82 MMHG | OXYGEN SATURATION: 99 % | HEIGHT: 71.69 IN | WEIGHT: 249.81 LBS | BODY MASS INDEX: 34.21 KG/M2

## 2021-03-15 DIAGNOSIS — C90.00 MULTIPLE MYELOMA NOT HAVING ACHIEVED REMISSION (HCC): Primary | ICD-10-CM

## 2021-03-15 PROCEDURE — 96401 CHEMO ANTI-NEOPL SQ/IM: CPT

## 2021-03-15 NOTE — PROGRESS NOTES
Pt here for C2D11.   Arrives Ambulating independently, accompanied by Self           Pregnancy screening: Not applicable    Modifications in dose or schedule: No     Frequency of blood return and site check throughout administration: Other injection only

## 2021-03-16 ENCOUNTER — TELEPHONE (OUTPATIENT)
Dept: HEMATOLOGY/ONCOLOGY | Facility: HOSPITAL | Age: 61
End: 2021-03-16

## 2021-03-16 LAB
ALBUMIN SERPL ELPH-MCNC: 3.92 G/DL (ref 3.75–5.21)
ALBUMIN/GLOB SERPL: 1.52 {RATIO} (ref 1–2)
ALPHA1 GLOB SERPL ELPH-MCNC: 0.35 G/DL (ref 0.19–0.46)
ALPHA2 GLOB SERPL ELPH-MCNC: 0.76 G/DL (ref 0.48–1.05)
B-GLOBULIN SERPL ELPH-MCNC: 0.75 G/DL (ref 0.68–1.23)
GAMMA GLOB SERPL ELPH-MCNC: 0.72 G/DL (ref 0.62–1.7)
KAPPA LC FREE SER-MCNC: 1.75 MG/DL (ref 0.33–1.94)
KAPPA LC FREE/LAMBDA FREE SER NEPH: 0.91 {RATIO} (ref 0.26–1.65)
LAMBDA LC FREE SERPL-MCNC: 1.93 MG/DL (ref 0.57–2.63)
M PROTEIN MFR SERPL ELPH: 6.5 G/DL (ref 6.4–8.2)

## 2021-03-16 NOTE — TELEPHONE ENCOUNTER
Patient called and he said he had a quick question in regards to his last visit with Dr. Omar Martinez.

## 2021-03-16 NOTE — TELEPHONE ENCOUNTER
Patient's wife will be out of town on 3/26 and she would usually come with patient to the visit. Patient is asking if he could bring his mother. Advised patient ok to do so.

## 2021-03-17 DIAGNOSIS — C90.00 MULTIPLE MYELOMA NOT HAVING ACHIEVED REMISSION (HCC): ICD-10-CM

## 2021-03-17 DIAGNOSIS — Z23 NEED FOR VACCINATION: ICD-10-CM

## 2021-03-18 ENCOUNTER — IMMUNIZATION (OUTPATIENT)
Dept: LAB | Age: 61
End: 2021-03-18
Attending: HOSPITALIST
Payer: COMMERCIAL

## 2021-03-18 DIAGNOSIS — Z23 NEED FOR VACCINATION: Primary | ICD-10-CM

## 2021-03-18 PROCEDURE — 0001A SARSCOV2 VAC 30MCG/0.3ML IM: CPT

## 2021-03-26 ENCOUNTER — OFFICE VISIT (OUTPATIENT)
Dept: HEMATOLOGY/ONCOLOGY | Facility: HOSPITAL | Age: 61
End: 2021-03-26
Attending: INTERNAL MEDICINE
Payer: COMMERCIAL

## 2021-03-26 VITALS
SYSTOLIC BLOOD PRESSURE: 142 MMHG | DIASTOLIC BLOOD PRESSURE: 84 MMHG | HEART RATE: 84 BPM | TEMPERATURE: 97 F | BODY MASS INDEX: 34.48 KG/M2 | OXYGEN SATURATION: 95 % | RESPIRATION RATE: 16 BRPM | WEIGHT: 251.81 LBS | HEIGHT: 71.69 IN

## 2021-03-26 DIAGNOSIS — C90.00 MULTIPLE MYELOMA NOT HAVING ACHIEVED REMISSION (HCC): Primary | ICD-10-CM

## 2021-03-26 LAB
ALBUMIN SERPL-MCNC: 3.4 G/DL (ref 3.4–5)
ALBUMIN/GLOB SERPL: 1.1 {RATIO} (ref 1–2)
ALP LIVER SERPL-CCNC: 80 U/L
ALT SERPL-CCNC: 30 U/L
ANION GAP SERPL CALC-SCNC: 6 MMOL/L (ref 0–18)
AST SERPL-CCNC: 15 U/L (ref 15–37)
BASOPHILS # BLD AUTO: 0.03 X10(3) UL (ref 0–0.2)
BASOPHILS NFR BLD AUTO: 0.7 %
BILIRUB SERPL-MCNC: 0.5 MG/DL (ref 0.1–2)
BUN BLD-MCNC: 11 MG/DL (ref 7–18)
BUN/CREAT SERPL: 10.6 (ref 10–20)
CALCIUM BLD-MCNC: 8.5 MG/DL (ref 8.5–10.1)
CHLORIDE SERPL-SCNC: 110 MMOL/L (ref 98–112)
CO2 SERPL-SCNC: 23 MMOL/L (ref 21–32)
CREAT BLD-MCNC: 1.04 MG/DL
DEPRECATED RDW RBC AUTO: 42 FL (ref 35.1–46.3)
EOSINOPHIL # BLD AUTO: 0.01 X10(3) UL (ref 0–0.7)
EOSINOPHIL NFR BLD AUTO: 0.2 %
ERYTHROCYTE [DISTWIDTH] IN BLOOD BY AUTOMATED COUNT: 13.7 % (ref 11–15)
GLOBULIN PLAS-MCNC: 3.2 G/DL (ref 2.8–4.4)
GLUCOSE BLD-MCNC: 189 MG/DL (ref 70–99)
HCT VFR BLD AUTO: 41 %
HGB BLD-MCNC: 13.7 G/DL
IMM GRANULOCYTES # BLD AUTO: 0.05 X10(3) UL (ref 0–1)
IMM GRANULOCYTES NFR BLD: 1.2 %
LYMPHOCYTES # BLD AUTO: 0.27 X10(3) UL (ref 1–4)
LYMPHOCYTES NFR BLD AUTO: 6.5 %
M PROTEIN MFR SERPL ELPH: 6.6 G/DL (ref 6.4–8.2)
MCH RBC QN AUTO: 28.8 PG (ref 26–34)
MCHC RBC AUTO-ENTMCNC: 33.4 G/DL (ref 31–37)
MCV RBC AUTO: 86.3 FL
MONOCYTES # BLD AUTO: 0.06 X10(3) UL (ref 0.1–1)
MONOCYTES NFR BLD AUTO: 1.4 %
NEUTROPHILS # BLD AUTO: 3.75 X10 (3) UL (ref 1.5–7.7)
NEUTROPHILS # BLD AUTO: 3.75 X10(3) UL (ref 1.5–7.7)
NEUTROPHILS NFR BLD AUTO: 90 %
OSMOLALITY SERPL CALC.SUM OF ELEC: 292 MOSM/KG (ref 275–295)
PATIENT FASTING Y/N/NP: NO
PLATELET # BLD AUTO: 199 10(3)UL (ref 150–450)
POTASSIUM SERPL-SCNC: 4.1 MMOL/L (ref 3.5–5.1)
RBC # BLD AUTO: 4.75 X10(6)UL
SODIUM SERPL-SCNC: 139 MMOL/L (ref 136–145)
WBC # BLD AUTO: 4.2 X10(3) UL (ref 4–11)

## 2021-03-26 PROCEDURE — 96401 CHEMO ANTI-NEOPL SQ/IM: CPT

## 2021-03-26 PROCEDURE — 99214 OFFICE O/P EST MOD 30 MIN: CPT | Performed by: INTERNAL MEDICINE

## 2021-03-26 NOTE — PROGRESS NOTES
Pt here for C3D1.   Arrives Ambulating independently, accompanied by Self           Pregnancy screening: Not applicable    Modifications in dose or schedule: No     Frequency of blood return and site check throughout administration: Other NA- SubQ injection

## 2021-03-28 NOTE — PROGRESS NOTES
[facility]    PATIENT'S NAME: Alfredo Mata   ATTENDING PHYSICIAN: Sumit Smith M.D.    PATIENT ACCOUNT #: [de-identified] LOCATION: [address] [place] [address] RECORD #: [id number] YOB: 1960   DATE OF SERVICE: 03/26/2021       CANCER CE sclerae. Pharynx without lesions. LYMPHATICS:  He has no cervical supraclavicular axillary adenopathy. LUNGS:  Resonant to percussion and clear to auscultation with no wheezing, rales, or rhonchi.   HEART:  Normal.  ABDOMEN:  No hepatosplenomegaly or t

## 2021-03-29 ENCOUNTER — OFFICE VISIT (OUTPATIENT)
Dept: HEMATOLOGY/ONCOLOGY | Facility: HOSPITAL | Age: 61
End: 2021-03-29
Attending: INTERNAL MEDICINE
Payer: COMMERCIAL

## 2021-03-29 VITALS
RESPIRATION RATE: 18 BRPM | DIASTOLIC BLOOD PRESSURE: 74 MMHG | HEART RATE: 77 BPM | SYSTOLIC BLOOD PRESSURE: 140 MMHG | HEIGHT: 71.69 IN | OXYGEN SATURATION: 97 % | BODY MASS INDEX: 34.3 KG/M2 | TEMPERATURE: 97 F | WEIGHT: 250.5 LBS

## 2021-03-29 DIAGNOSIS — C90.00 MULTIPLE MYELOMA NOT HAVING ACHIEVED REMISSION (HCC): Primary | ICD-10-CM

## 2021-03-29 PROCEDURE — 96401 CHEMO ANTI-NEOPL SQ/IM: CPT

## 2021-03-29 NOTE — PROGRESS NOTES
Pt here for C 3 D 4 velcade.   Arrives Ambulating independently, accompanied by Self           Pregnancy screening: Not applicable    Modifications in dose or schedule: No     Frequency of blood return and site check throughout administration: Other floresita Mckeon

## 2021-03-30 LAB
ALBUMIN SERPL ELPH-MCNC: 3.89 G/DL (ref 3.75–5.21)
ALBUMIN/GLOB SERPL: 1.62 {RATIO} (ref 1–2)
ALPHA1 GLOB SERPL ELPH-MCNC: 0.31 G/DL (ref 0.19–0.46)
ALPHA2 GLOB SERPL ELPH-MCNC: 0.73 G/DL (ref 0.48–1.05)
B-GLOBULIN SERPL ELPH-MCNC: 0.7 G/DL (ref 0.68–1.23)
GAMMA GLOB SERPL ELPH-MCNC: 0.67 G/DL (ref 0.62–1.7)
KAPPA LC FREE SER-MCNC: 1.77 MG/DL (ref 0.33–1.94)
KAPPA LC FREE/LAMBDA FREE SER NEPH: 1.06 {RATIO} (ref 0.26–1.65)
LAMBDA LC FREE SERPL-MCNC: 1.67 MG/DL (ref 0.57–2.63)
M PROTEIN MFR SERPL ELPH: 6.3 G/DL (ref 6.4–8.2)

## 2021-04-02 ENCOUNTER — OFFICE VISIT (OUTPATIENT)
Dept: HEMATOLOGY/ONCOLOGY | Facility: HOSPITAL | Age: 61
End: 2021-04-02
Attending: INTERNAL MEDICINE
Payer: COMMERCIAL

## 2021-04-02 VITALS
DIASTOLIC BLOOD PRESSURE: 74 MMHG | BODY MASS INDEX: 33.96 KG/M2 | WEIGHT: 248 LBS | HEIGHT: 71.69 IN | OXYGEN SATURATION: 94 % | SYSTOLIC BLOOD PRESSURE: 144 MMHG | HEART RATE: 92 BPM | RESPIRATION RATE: 16 BRPM | TEMPERATURE: 97 F

## 2021-04-02 DIAGNOSIS — C90.00 MULTIPLE MYELOMA NOT HAVING ACHIEVED REMISSION (HCC): Primary | ICD-10-CM

## 2021-04-02 PROCEDURE — 96401 CHEMO ANTI-NEOPL SQ/IM: CPT

## 2021-04-02 NOTE — PROGRESS NOTES
Pt here for C3D8.   Arrives Ambulating independently, accompanied by Self           Pregnancy screening: Not applicable    Modifications in dose or schedule: No     Frequency of blood return and site check throughout administration: Other SubQ   Discharged

## 2021-04-05 ENCOUNTER — OFFICE VISIT (OUTPATIENT)
Dept: HEMATOLOGY/ONCOLOGY | Facility: HOSPITAL | Age: 61
End: 2021-04-05
Attending: INTERNAL MEDICINE
Payer: COMMERCIAL

## 2021-04-05 VITALS
RESPIRATION RATE: 18 BRPM | WEIGHT: 251.38 LBS | DIASTOLIC BLOOD PRESSURE: 67 MMHG | HEIGHT: 71.69 IN | OXYGEN SATURATION: 96 % | BODY MASS INDEX: 34.42 KG/M2 | HEART RATE: 75 BPM | SYSTOLIC BLOOD PRESSURE: 121 MMHG | TEMPERATURE: 98 F

## 2021-04-05 DIAGNOSIS — C90.00 MULTIPLE MYELOMA NOT HAVING ACHIEVED REMISSION (HCC): Primary | ICD-10-CM

## 2021-04-05 PROCEDURE — 96401 CHEMO ANTI-NEOPL SQ/IM: CPT

## 2021-04-05 NOTE — PROGRESS NOTES
Pt here for C3D11.   Arrives Ambulating independently, accompanied by Self           Pregnancy screening: Not applicable    Modifications in dose or schedule: No     Frequency of blood return and site check throughout administration: Other given sub q   Dis

## 2021-04-07 DIAGNOSIS — C90.00 MULTIPLE MYELOMA NOT HAVING ACHIEVED REMISSION (HCC): ICD-10-CM

## 2021-04-08 ENCOUNTER — IMMUNIZATION (OUTPATIENT)
Dept: LAB | Age: 61
End: 2021-04-08
Attending: HOSPITALIST
Payer: COMMERCIAL

## 2021-04-08 DIAGNOSIS — Z23 NEED FOR VACCINATION: Primary | ICD-10-CM

## 2021-04-08 PROCEDURE — 0002A SARSCOV2 VAC 30MCG/0.3ML IM: CPT

## 2021-04-09 DIAGNOSIS — C90.00 MULTIPLE MYELOMA NOT HAVING ACHIEVED REMISSION (HCC): ICD-10-CM

## 2021-04-09 RX ORDER — ACYCLOVIR 200 MG/1
CAPSULE ORAL
Qty: 60 CAPSULE | Refills: 3 | Status: SHIPPED | OUTPATIENT
Start: 2021-04-09 | End: 2021-06-12

## 2021-04-16 ENCOUNTER — OFFICE VISIT (OUTPATIENT)
Dept: HEMATOLOGY/ONCOLOGY | Facility: HOSPITAL | Age: 61
End: 2021-04-16
Attending: INTERNAL MEDICINE
Payer: COMMERCIAL

## 2021-04-16 VITALS
OXYGEN SATURATION: 98 % | HEART RATE: 70 BPM | DIASTOLIC BLOOD PRESSURE: 82 MMHG | TEMPERATURE: 97 F | SYSTOLIC BLOOD PRESSURE: 137 MMHG | WEIGHT: 251 LBS | BODY MASS INDEX: 34 KG/M2 | RESPIRATION RATE: 18 BRPM

## 2021-04-16 DIAGNOSIS — C90.00 MULTIPLE MYELOMA NOT HAVING ACHIEVED REMISSION (HCC): ICD-10-CM

## 2021-04-16 DIAGNOSIS — C90.00 MULTIPLE MYELOMA NOT HAVING ACHIEVED REMISSION (HCC): Primary | ICD-10-CM

## 2021-04-16 PROCEDURE — 96401 CHEMO ANTI-NEOPL SQ/IM: CPT

## 2021-04-16 PROCEDURE — 99214 OFFICE O/P EST MOD 30 MIN: CPT | Performed by: INTERNAL MEDICINE

## 2021-04-16 PROCEDURE — 96374 THER/PROPH/DIAG INJ IV PUSH: CPT

## 2021-04-16 NOTE — PROGRESS NOTES
Patient is here for MD f/u and cycle 4 of Velcade/Revlimid/Zometa. Patient reports fatigue during the day and insomnia at night. Appetite is good. Denies pain at present time.          Education Record    Learner:  Patient    Disease / Diagnosis:  Multiple

## 2021-04-18 NOTE — PROGRESS NOTES
[facility]    PATIENT'S NAME: Arnav Kapoor   ATTENDING PHYSICIAN: Elena Kay M.D.    PATIENT ACCOUNT #: [de-identified] LOCATION: [address] [place] [address] RECORD #: [id number] YOB: 1960   DATE OF SERVICE: 04/16/2021       CANCER CE elevated in the past.  They have been normal the last cycle or two. IMPRESSION:  Multiple myeloma. He is proceeding with cycle 4 of RVD.   He is due to see Dr. Rocky Gonzalez at [facility] toward the end of this cycle, and then they will time his stem cell collect

## 2021-04-19 ENCOUNTER — OFFICE VISIT (OUTPATIENT)
Dept: HEMATOLOGY/ONCOLOGY | Facility: HOSPITAL | Age: 61
End: 2021-04-19
Attending: INTERNAL MEDICINE
Payer: COMMERCIAL

## 2021-04-19 VITALS
DIASTOLIC BLOOD PRESSURE: 75 MMHG | WEIGHT: 255 LBS | TEMPERATURE: 97 F | RESPIRATION RATE: 16 BRPM | OXYGEN SATURATION: 98 % | HEIGHT: 71.69 IN | HEART RATE: 67 BPM | SYSTOLIC BLOOD PRESSURE: 132 MMHG | BODY MASS INDEX: 34.92 KG/M2

## 2021-04-19 DIAGNOSIS — C90.00 MULTIPLE MYELOMA NOT HAVING ACHIEVED REMISSION (HCC): Primary | ICD-10-CM

## 2021-04-19 PROCEDURE — 96401 CHEMO ANTI-NEOPL SQ/IM: CPT

## 2021-04-19 NOTE — PROGRESS NOTES
Pt here for C4D4 Velcade.   Arrives Ambulating independently, accompanied by Self           Pregnancy screening: Not applicable    Modifications in dose or schedule: No     Frequency of blood return and site check throughout administration: Other No IV need

## 2021-04-23 ENCOUNTER — OFFICE VISIT (OUTPATIENT)
Dept: HEMATOLOGY/ONCOLOGY | Facility: HOSPITAL | Age: 61
End: 2021-04-23
Attending: INTERNAL MEDICINE
Payer: COMMERCIAL

## 2021-04-23 VITALS
HEART RATE: 90 BPM | SYSTOLIC BLOOD PRESSURE: 133 MMHG | TEMPERATURE: 98 F | WEIGHT: 250.38 LBS | HEIGHT: 71.69 IN | RESPIRATION RATE: 18 BRPM | OXYGEN SATURATION: 97 % | DIASTOLIC BLOOD PRESSURE: 67 MMHG | BODY MASS INDEX: 34.29 KG/M2

## 2021-04-23 DIAGNOSIS — C90.00 MULTIPLE MYELOMA NOT HAVING ACHIEVED REMISSION (HCC): Primary | ICD-10-CM

## 2021-04-23 PROCEDURE — 96401 CHEMO ANTI-NEOPL SQ/IM: CPT

## 2021-04-26 ENCOUNTER — OFFICE VISIT (OUTPATIENT)
Dept: HEMATOLOGY/ONCOLOGY | Facility: HOSPITAL | Age: 61
End: 2021-04-26
Attending: INTERNAL MEDICINE
Payer: COMMERCIAL

## 2021-04-26 VITALS
TEMPERATURE: 98 F | WEIGHT: 255.63 LBS | HEART RATE: 79 BPM | RESPIRATION RATE: 16 BRPM | BODY MASS INDEX: 35 KG/M2 | SYSTOLIC BLOOD PRESSURE: 146 MMHG | OXYGEN SATURATION: 97 % | DIASTOLIC BLOOD PRESSURE: 88 MMHG | HEIGHT: 71.69 IN

## 2021-04-26 DIAGNOSIS — C90.00 MULTIPLE MYELOMA NOT HAVING ACHIEVED REMISSION (HCC): Primary | ICD-10-CM

## 2021-04-26 PROCEDURE — 96401 CHEMO ANTI-NEOPL SQ/IM: CPT

## 2021-04-26 NOTE — PROGRESS NOTES
Pt here for C4D11.   Arrives Ambulating independently, accompanied by Self           Pregnancy screening: Not applicable    Modifications in dose or schedule: No     Frequency of blood return and site check throughout administration: Other injection only

## 2021-05-03 DIAGNOSIS — C90.00 MULTIPLE MYELOMA NOT HAVING ACHIEVED REMISSION (HCC): ICD-10-CM

## 2021-05-07 ENCOUNTER — OFFICE VISIT (OUTPATIENT)
Dept: HEMATOLOGY/ONCOLOGY | Facility: HOSPITAL | Age: 61
End: 2021-05-07
Attending: INTERNAL MEDICINE
Payer: COMMERCIAL

## 2021-05-07 VITALS
HEIGHT: 71.69 IN | TEMPERATURE: 97 F | BODY MASS INDEX: 34.59 KG/M2 | WEIGHT: 252.63 LBS | HEART RATE: 74 BPM | DIASTOLIC BLOOD PRESSURE: 86 MMHG | OXYGEN SATURATION: 97 % | SYSTOLIC BLOOD PRESSURE: 135 MMHG | RESPIRATION RATE: 18 BRPM

## 2021-05-07 DIAGNOSIS — C90.00 MULTIPLE MYELOMA NOT HAVING ACHIEVED REMISSION (HCC): Primary | ICD-10-CM

## 2021-05-07 DIAGNOSIS — C90.00 MULTIPLE MYELOMA NOT HAVING ACHIEVED REMISSION (HCC): ICD-10-CM

## 2021-05-07 PROCEDURE — 96401 CHEMO ANTI-NEOPL SQ/IM: CPT

## 2021-05-07 PROCEDURE — 99214 OFFICE O/P EST MOD 30 MIN: CPT | Performed by: INTERNAL MEDICINE

## 2021-05-07 NOTE — PROGRESS NOTES
Pt here for C5D1.   Arrives Ambulating independently, accompanied by Self           Pregnancy screening: Not applicable    Modifications in dose or schedule: No     Frequency of blood return and site check throughout administration: Other injection only   D

## 2021-05-10 ENCOUNTER — OFFICE VISIT (OUTPATIENT)
Dept: HEMATOLOGY/ONCOLOGY | Facility: HOSPITAL | Age: 61
End: 2021-05-10
Attending: INTERNAL MEDICINE
Payer: COMMERCIAL

## 2021-05-10 VITALS
SYSTOLIC BLOOD PRESSURE: 150 MMHG | OXYGEN SATURATION: 94 % | DIASTOLIC BLOOD PRESSURE: 79 MMHG | TEMPERATURE: 97 F | HEART RATE: 71 BPM | WEIGHT: 258.81 LBS | HEIGHT: 71.69 IN | RESPIRATION RATE: 18 BRPM | BODY MASS INDEX: 35.44 KG/M2

## 2021-05-10 DIAGNOSIS — C90.00 MULTIPLE MYELOMA NOT HAVING ACHIEVED REMISSION (HCC): Primary | ICD-10-CM

## 2021-05-10 PROCEDURE — 96401 CHEMO ANTI-NEOPL SQ/IM: CPT

## 2021-05-10 NOTE — PROGRESS NOTES
Pt here for C5D4.   Arrives Ambulating independently, accompanied by Self           Pregnancy screening: Not applicable    Modifications in dose or schedule: No     Frequency of blood return and site check throughout administration: Other SubQ   Discharged

## 2021-05-10 NOTE — PROGRESS NOTES
[facility]    PATIENT'S NAME: Екатерина Fiore   ATTENDING PHYSICIAN: Elke Sifuentes M.D.    PATIENT ACCOUNT #: [de-identified] LOCATION: [address] [place] [address] RECORD #: [id number] YOB: 1960   DATE OF SERVICE: 05/07/2021       CANCER CE pulse 74, respiratory rate 20, temperature is 97.2. HEENT:  Unremarkable. LYMPHATICS:  He has no adenopathy. LUNGS:  Clear. HEART:  Normal.  ABDOMEN:  No hepatosplenomegaly or tenderness. EXTREMITIES:  He has no clubbing, cyanosis or edema.   Magdiel Barr

## 2021-05-14 ENCOUNTER — OFFICE VISIT (OUTPATIENT)
Dept: HEMATOLOGY/ONCOLOGY | Facility: HOSPITAL | Age: 61
End: 2021-05-14
Attending: INTERNAL MEDICINE
Payer: COMMERCIAL

## 2021-05-14 VITALS
SYSTOLIC BLOOD PRESSURE: 126 MMHG | BODY MASS INDEX: 34 KG/M2 | HEART RATE: 102 BPM | DIASTOLIC BLOOD PRESSURE: 79 MMHG | OXYGEN SATURATION: 97 % | RESPIRATION RATE: 16 BRPM | TEMPERATURE: 98 F | WEIGHT: 252 LBS

## 2021-05-14 DIAGNOSIS — C90.00 MULTIPLE MYELOMA NOT HAVING ACHIEVED REMISSION (HCC): Primary | ICD-10-CM

## 2021-05-14 PROCEDURE — 96374 THER/PROPH/DIAG INJ IV PUSH: CPT

## 2021-05-14 PROCEDURE — 96401 CHEMO ANTI-NEOPL SQ/IM: CPT

## 2021-05-14 PROCEDURE — 82565 ASSAY OF CREATININE: CPT

## 2021-05-14 PROCEDURE — 82310 ASSAY OF CALCIUM: CPT

## 2021-05-14 PROCEDURE — 36415 COLL VENOUS BLD VENIPUNCTURE: CPT

## 2021-05-14 NOTE — PROGRESS NOTES
Pt here for C5D8 Velcade/Zometa.   Arrives Ambulating independently, accompanied by Self           Pregnancy screening: Not applicable    Modifications in dose or schedule: No     Frequency of blood return and site check throughout administration: Other inj

## 2021-05-17 ENCOUNTER — OFFICE VISIT (OUTPATIENT)
Dept: HEMATOLOGY/ONCOLOGY | Facility: HOSPITAL | Age: 61
End: 2021-05-17
Attending: INTERNAL MEDICINE
Payer: COMMERCIAL

## 2021-05-17 VITALS
OXYGEN SATURATION: 95 % | HEIGHT: 71.69 IN | HEART RATE: 77 BPM | DIASTOLIC BLOOD PRESSURE: 89 MMHG | BODY MASS INDEX: 35 KG/M2 | WEIGHT: 255.63 LBS | TEMPERATURE: 98 F | SYSTOLIC BLOOD PRESSURE: 166 MMHG | RESPIRATION RATE: 16 BRPM

## 2021-05-17 DIAGNOSIS — C90.00 MULTIPLE MYELOMA NOT HAVING ACHIEVED REMISSION (HCC): Primary | ICD-10-CM

## 2021-05-17 PROCEDURE — 96401 CHEMO ANTI-NEOPL SQ/IM: CPT

## 2021-05-17 NOTE — PROGRESS NOTES
Pt here for C5D11.   Arrives Ambulating independently, accompanied by Self           Pregnancy screening: Not applicable    Modifications in dose or schedule: No     Frequency of blood return and site check throughout administration: Other SubQ   Discharged

## 2021-05-23 DIAGNOSIS — C90.00 MULTIPLE MYELOMA NOT HAVING ACHIEVED REMISSION (HCC): ICD-10-CM

## 2021-05-23 RX ORDER — LENALIDOMIDE 25 MG/1
25 CAPSULE ORAL DAILY
Qty: 14 CAPSULE | Refills: 0 | Status: CANCELLED | OUTPATIENT
Start: 2021-05-23

## 2021-05-24 DIAGNOSIS — C90.00 MULTIPLE MYELOMA NOT HAVING ACHIEVED REMISSION (HCC): ICD-10-CM

## 2021-05-28 ENCOUNTER — APPOINTMENT (OUTPATIENT)
Dept: HEMATOLOGY/ONCOLOGY | Facility: HOSPITAL | Age: 61
End: 2021-05-28
Attending: INTERNAL MEDICINE
Payer: COMMERCIAL

## 2021-06-11 DIAGNOSIS — C90.00 MULTIPLE MYELOMA NOT HAVING ACHIEVED REMISSION (HCC): ICD-10-CM

## 2021-06-11 RX ORDER — LENALIDOMIDE 25 MG/1
CAPSULE ORAL
Qty: 14 CAPSULE | Refills: 0 | OUTPATIENT
Start: 2021-06-11

## 2021-06-12 DIAGNOSIS — C90.00 MULTIPLE MYELOMA NOT HAVING ACHIEVED REMISSION (HCC): ICD-10-CM

## 2021-06-14 RX ORDER — ACYCLOVIR 200 MG/1
CAPSULE ORAL
Qty: 60 CAPSULE | Refills: 3 | OUTPATIENT
Start: 2021-06-14

## 2021-06-14 RX ORDER — ACYCLOVIR 200 MG/1
400 CAPSULE ORAL 2 TIMES DAILY
Qty: 60 CAPSULE | Refills: 3 | Status: SHIPPED | OUTPATIENT
Start: 2021-06-14 | End: 2021-12-17

## 2021-07-12 DIAGNOSIS — C90.00 MULTIPLE MYELOMA NOT HAVING ACHIEVED REMISSION (HCC): ICD-10-CM

## 2021-11-17 RX ORDER — LENALIDOMIDE 10 MG/1
10 CAPSULE ORAL DAILY
Qty: 21 CAPSULE | Refills: 0 | Status: SHIPPED | OUTPATIENT
Start: 2021-11-17 | End: 2021-12-23

## 2021-11-17 NOTE — PROGRESS NOTES
Patient to follow up with Dr Kandi Cantu in the next few weeks post transplant. Patient to restart Revlimid at 10 mg daily for 21 days for 3 months, then if tolerating well, increase to 15 mg and stay on this for maintenance.  Message sent to PSRs to contact marco

## 2021-11-18 ENCOUNTER — TELEPHONE (OUTPATIENT)
Dept: HEMATOLOGY/ONCOLOGY | Facility: HOSPITAL | Age: 61
End: 2021-11-18

## 2021-11-18 NOTE — TELEPHONE ENCOUNTER
LVMTCB for a F/U in the next 2-4 weeks with Dr. Jg Pool, please see message under scheduling, 11/18/21, call # 1

## 2021-11-18 NOTE — TELEPHONE ENCOUNTER
Made an appointment for 1:30 p.m.on 12/17/21. There are no labs in the chart. Can you please put them in the chart when you have a chance  if you want the patient to get labs? Please advise. Thank you.

## 2021-11-23 DIAGNOSIS — C90.00 MULTIPLE MYELOMA NOT HAVING ACHIEVED REMISSION (HCC): Primary | ICD-10-CM

## 2021-11-23 RX ORDER — PROCHLORPERAZINE MALEATE 10 MG
10 TABLET ORAL EVERY 6 HOURS PRN
Qty: 30 TABLET | Refills: 3 | Status: SHIPPED | OUTPATIENT
Start: 2021-11-23 | End: 2021-12-17

## 2021-12-17 ENCOUNTER — OFFICE VISIT (OUTPATIENT)
Dept: HEMATOLOGY/ONCOLOGY | Facility: HOSPITAL | Age: 61
End: 2021-12-17
Attending: INTERNAL MEDICINE
Payer: COMMERCIAL

## 2021-12-17 VITALS
HEART RATE: 92 BPM | OXYGEN SATURATION: 98 % | TEMPERATURE: 97 F | DIASTOLIC BLOOD PRESSURE: 69 MMHG | WEIGHT: 225 LBS | RESPIRATION RATE: 16 BRPM | SYSTOLIC BLOOD PRESSURE: 105 MMHG | HEIGHT: 71.69 IN | BODY MASS INDEX: 30.81 KG/M2

## 2021-12-17 DIAGNOSIS — C90.00 MULTIPLE PLASMACYTOMA OF BONE (HCC): Primary | ICD-10-CM

## 2021-12-17 DIAGNOSIS — C90.00 MULTIPLE MYELOMA NOT HAVING ACHIEVED REMISSION (HCC): ICD-10-CM

## 2021-12-17 PROCEDURE — 99214 OFFICE O/P EST MOD 30 MIN: CPT | Performed by: INTERNAL MEDICINE

## 2021-12-17 RX ORDER — ACYCLOVIR 400 MG/1
400 TABLET ORAL 2 TIMES DAILY
COMMUNITY

## 2021-12-17 NOTE — PROGRESS NOTES
Pt here for 6 month MD f/u. Pt had Stem cell transplant in July. Energy level is up and down. Appetite has been up and down. Denies pain. Pt has no further complaints.      Outpatient Oncology Care Plan  Problem list:  fatigue  knowledge deficit    Problems

## 2021-12-21 NOTE — PROGRESS NOTES
[facility]    PATIENT'S NAME: [facility]   ATTENDING PHYSICIAN: Mckenzie Kruse M.D.    PATIENT ACCOUNT #: [de-identified] LOCATION: [address] [place] [address] RECORD #: [id number] YOB: 1960   DATE OF SERVICE: 12/17/2021       CANCER CE without lesions. LYMPHATICS:  No cervical, supraclavicular, or axillary adenopathy. LUNGS:  Resonant to percussion and clear to auscultation with no wheezing, rales, or rhonchi. HEART:  Normal.  ABDOMEN:  No hepatosplenomegaly or tenderness.     EXTREMIT

## 2021-12-23 ENCOUNTER — OFFICE VISIT (OUTPATIENT)
Dept: HEMATOLOGY/ONCOLOGY | Facility: HOSPITAL | Age: 61
End: 2021-12-23
Attending: INTERNAL MEDICINE
Payer: COMMERCIAL

## 2021-12-23 VITALS
RESPIRATION RATE: 16 BRPM | TEMPERATURE: 97 F | DIASTOLIC BLOOD PRESSURE: 72 MMHG | HEART RATE: 69 BPM | WEIGHT: 225 LBS | SYSTOLIC BLOOD PRESSURE: 126 MMHG | HEIGHT: 71.69 IN | BODY MASS INDEX: 30.81 KG/M2 | OXYGEN SATURATION: 99 %

## 2021-12-23 DIAGNOSIS — C90.00 MULTIPLE MYELOMA NOT HAVING ACHIEVED REMISSION (HCC): Primary | ICD-10-CM

## 2021-12-23 PROCEDURE — 36415 COLL VENOUS BLD VENIPUNCTURE: CPT

## 2021-12-23 PROCEDURE — 82310 ASSAY OF CALCIUM: CPT

## 2021-12-23 PROCEDURE — 82565 ASSAY OF CREATININE: CPT

## 2021-12-23 PROCEDURE — 96374 THER/PROPH/DIAG INJ IV PUSH: CPT

## 2021-12-23 RX ORDER — LENALIDOMIDE 10 MG/1
10 CAPSULE ORAL DAILY
Qty: 21 CAPSULE | Refills: 0 | Status: SHIPPED | OUTPATIENT
Start: 2021-12-23 | End: 2022-01-19

## 2021-12-23 NOTE — PROGRESS NOTES
Education Record    Learner:  Patient    Disease / Diagnosis: Zometa    Barriers / Limitations:  None   Comments:    Method:  Discussion   Comments:    General Topics:  Plan of care reviewed   Comments:    Outcome:  Shows understanding   Comments:

## 2021-12-30 ENCOUNTER — NURSE ONLY (OUTPATIENT)
Dept: HEMATOLOGY/ONCOLOGY | Facility: HOSPITAL | Age: 61
End: 2021-12-30
Attending: INTERNAL MEDICINE
Payer: COMMERCIAL

## 2021-12-30 DIAGNOSIS — C90.00 MULTIPLE MYELOMA NOT HAVING ACHIEVED REMISSION (HCC): ICD-10-CM

## 2021-12-30 PROCEDURE — 83883 ASSAY NEPHELOMETRY NOT SPEC: CPT

## 2021-12-30 PROCEDURE — 85025 COMPLETE CBC W/AUTO DIFF WBC: CPT

## 2021-12-30 PROCEDURE — 82728 ASSAY OF FERRITIN: CPT

## 2021-12-30 PROCEDURE — 82232 ASSAY OF BETA-2 PROTEIN: CPT

## 2021-12-30 PROCEDURE — 36415 COLL VENOUS BLD VENIPUNCTURE: CPT

## 2021-12-30 PROCEDURE — 82784 ASSAY IGA/IGD/IGG/IGM EACH: CPT

## 2021-12-30 PROCEDURE — 80053 COMPREHEN METABOLIC PANEL: CPT

## 2022-01-03 LAB
KAPPA LC FREE SER-MCNC: 1.6 MG/DL (ref 0.33–1.94)
KAPPA LC FREE/LAMBDA FREE SER NEPH: 1.13 {RATIO} (ref 0.26–1.65)
LAMBDA LC FREE SERPL-MCNC: 1.41 MG/DL (ref 0.57–2.63)

## 2022-01-06 ENCOUNTER — NURSE ONLY (OUTPATIENT)
Dept: HEMATOLOGY/ONCOLOGY | Facility: HOSPITAL | Age: 62
End: 2022-01-06
Attending: INTERNAL MEDICINE
Payer: COMMERCIAL

## 2022-01-06 DIAGNOSIS — C90.00 MULTIPLE MYELOMA NOT HAVING ACHIEVED REMISSION (HCC): ICD-10-CM

## 2022-01-06 LAB
ALBUMIN SERPL-MCNC: 3 G/DL (ref 3.4–5)
ALBUMIN/GLOB SERPL: 1 {RATIO} (ref 1–2)
ALP LIVER SERPL-CCNC: 68 U/L
ALT SERPL-CCNC: 25 U/L
ANION GAP SERPL CALC-SCNC: 4 MMOL/L (ref 0–18)
AST SERPL-CCNC: 16 U/L (ref 15–37)
B2 MICROGLOB SERPL-MCNC: 0.17 MG/DL (ref 0.11–0.25)
BASOPHILS # BLD AUTO: 0.04 X10(3) UL (ref 0–0.2)
BASOPHILS NFR BLD AUTO: 1.1 %
BILIRUB SERPL-MCNC: 0.5 MG/DL (ref 0.1–2)
BUN BLD-MCNC: 12 MG/DL (ref 7–18)
CALCIUM BLD-MCNC: 8.5 MG/DL (ref 8.5–10.1)
CHLORIDE SERPL-SCNC: 109 MMOL/L (ref 98–112)
CO2 SERPL-SCNC: 26 MMOL/L (ref 21–32)
CREAT BLD-MCNC: 0.84 MG/DL
DEPRECATED HBV CORE AB SER IA-ACNC: 103.4 NG/ML
EOSINOPHIL # BLD AUTO: 0.15 X10(3) UL (ref 0–0.7)
EOSINOPHIL NFR BLD AUTO: 4.3 %
ERYTHROCYTE [DISTWIDTH] IN BLOOD BY AUTOMATED COUNT: 13.3 %
FASTING STATUS PATIENT QL REPORTED: NO
GLOBULIN PLAS-MCNC: 2.9 G/DL (ref 2.8–4.4)
GLUCOSE BLD-MCNC: 96 MG/DL (ref 70–99)
HCT VFR BLD AUTO: 34.6 %
HGB BLD-MCNC: 11.6 G/DL
IGA SERPL-MCNC: 45 MG/DL (ref 70–312)
IGM SERPL-MCNC: 43 MG/DL (ref 43–279)
IMM GRANULOCYTES # BLD AUTO: 0.04 X10(3) UL (ref 0–1)
IMM GRANULOCYTES NFR BLD: 1.1 %
IMMUNOGLOBULIN PNL SER-MCNC: 388 MG/DL (ref 791–1643)
LYMPHOCYTES # BLD AUTO: 0.52 X10(3) UL (ref 1–4)
LYMPHOCYTES NFR BLD AUTO: 14.8 %
MCH RBC QN AUTO: 30.2 PG (ref 26–34)
MCHC RBC AUTO-ENTMCNC: 33.5 G/DL (ref 31–37)
MCV RBC AUTO: 90.1 FL
MONOCYTES # BLD AUTO: 0.57 X10(3) UL (ref 0.1–1)
MONOCYTES NFR BLD AUTO: 16.2 %
NEUTROPHILS # BLD AUTO: 2.19 X10 (3) UL (ref 1.5–7.7)
NEUTROPHILS # BLD AUTO: 2.19 X10(3) UL (ref 1.5–7.7)
NEUTROPHILS NFR BLD AUTO: 62.5 %
OSMOLALITY SERPL CALC.SUM OF ELEC: 288 MOSM/KG (ref 275–295)
PLATELET # BLD AUTO: 172 10(3)UL (ref 150–450)
POTASSIUM SERPL-SCNC: 3.7 MMOL/L (ref 3.5–5.1)
PROT SERPL-MCNC: 5.9 G/DL (ref 6.4–8.2)
RBC # BLD AUTO: 3.84 X10(6)UL
SODIUM SERPL-SCNC: 139 MMOL/L (ref 136–145)
WBC # BLD AUTO: 3.5 X10(3) UL (ref 4–11)

## 2022-01-06 PROCEDURE — 83521 IG LIGHT CHAINS FREE EACH: CPT

## 2022-01-06 PROCEDURE — 36415 COLL VENOUS BLD VENIPUNCTURE: CPT

## 2022-01-06 PROCEDURE — 80053 COMPREHEN METABOLIC PANEL: CPT

## 2022-01-06 PROCEDURE — 82232 ASSAY OF BETA-2 PROTEIN: CPT

## 2022-01-06 PROCEDURE — 82784 ASSAY IGA/IGD/IGG/IGM EACH: CPT

## 2022-01-06 PROCEDURE — 82728 ASSAY OF FERRITIN: CPT

## 2022-01-06 PROCEDURE — 85025 COMPLETE CBC W/AUTO DIFF WBC: CPT

## 2022-01-10 LAB
KAPPA LC FREE SER-MCNC: 2.17 MG/DL (ref 0.33–1.94)
KAPPA LC FREE/LAMBDA FREE SER NEPH: 1.46 {RATIO} (ref 0.26–1.65)
LAMBDA LC FREE SERPL-MCNC: 1.49 MG/DL (ref 0.57–2.63)

## 2022-01-13 ENCOUNTER — NURSE ONLY (OUTPATIENT)
Dept: HEMATOLOGY/ONCOLOGY | Facility: HOSPITAL | Age: 62
End: 2022-01-13
Attending: INTERNAL MEDICINE
Payer: COMMERCIAL

## 2022-01-13 DIAGNOSIS — C90.00 MULTIPLE MYELOMA NOT HAVING ACHIEVED REMISSION (HCC): ICD-10-CM

## 2022-01-13 DIAGNOSIS — N40.0 BENIGN PROSTATIC HYPERPLASIA WITHOUT LOWER URINARY TRACT SYMPTOMS: ICD-10-CM

## 2022-01-13 DIAGNOSIS — E78.00 PURE HYPERCHOLESTEROLEMIA: ICD-10-CM

## 2022-01-13 LAB
ALBUMIN SERPL-MCNC: 3.4 G/DL (ref 3.4–5)
ALBUMIN/GLOB SERPL: 1.1 {RATIO} (ref 1–2)
ALP LIVER SERPL-CCNC: 62 U/L
ALT SERPL-CCNC: 25 U/L
ANION GAP SERPL CALC-SCNC: 3 MMOL/L (ref 0–18)
AST SERPL-CCNC: 17 U/L (ref 15–37)
B2 MICROGLOB SERPL-MCNC: 0.2 MG/DL (ref 0.11–0.25)
BASOPHILS # BLD AUTO: 0.06 X10(3) UL (ref 0–0.2)
BASOPHILS NFR BLD AUTO: 2 %
BILIRUB SERPL-MCNC: 0.7 MG/DL (ref 0.1–2)
BUN BLD-MCNC: 13 MG/DL (ref 7–18)
CALCIUM BLD-MCNC: 8.8 MG/DL (ref 8.5–10.1)
CHLORIDE SERPL-SCNC: 108 MMOL/L (ref 98–112)
CHOLEST SERPL-MCNC: 119 MG/DL (ref ?–200)
CO2 SERPL-SCNC: 27 MMOL/L (ref 21–32)
CREAT BLD-MCNC: 0.83 MG/DL
DEPRECATED HBV CORE AB SER IA-ACNC: 103.1 NG/ML
EOSINOPHIL # BLD AUTO: 0.11 X10(3) UL (ref 0–0.7)
EOSINOPHIL NFR BLD AUTO: 3.6 %
ERYTHROCYTE [DISTWIDTH] IN BLOOD BY AUTOMATED COUNT: 13.3 %
FASTING STATUS PATIENT QL REPORTED: NO
GLOBULIN PLAS-MCNC: 3 G/DL (ref 2.8–4.4)
GLUCOSE BLD-MCNC: 85 MG/DL (ref 70–99)
HCT VFR BLD AUTO: 37 %
HDLC SERPL-MCNC: 33 MG/DL (ref 40–59)
HGB BLD-MCNC: 11.9 G/DL
IGA SERPL-MCNC: 50.5 MG/DL (ref 70–312)
IGM SERPL-MCNC: 71.9 MG/DL (ref 43–279)
IMM GRANULOCYTES # BLD AUTO: 0.03 X10(3) UL (ref 0–1)
IMM GRANULOCYTES NFR BLD: 1 %
IMMUNOGLOBULIN PNL SER-MCNC: 445 MG/DL (ref 791–1643)
LDLC SERPL CALC-MCNC: 65 MG/DL (ref ?–100)
LYMPHOCYTES # BLD AUTO: 0.57 X10(3) UL (ref 1–4)
LYMPHOCYTES NFR BLD AUTO: 18.6 %
MCH RBC QN AUTO: 29 PG (ref 26–34)
MCHC RBC AUTO-ENTMCNC: 32.2 G/DL (ref 31–37)
MCV RBC AUTO: 90 FL
MONOCYTES # BLD AUTO: 0.69 X10(3) UL (ref 0.1–1)
MONOCYTES NFR BLD AUTO: 22.5 %
NEUTROPHILS # BLD AUTO: 1.6 X10 (3) UL (ref 1.5–7.7)
NEUTROPHILS # BLD AUTO: 1.6 X10(3) UL (ref 1.5–7.7)
NEUTROPHILS NFR BLD AUTO: 52.3 %
NONHDLC SERPL-MCNC: 86 MG/DL (ref ?–130)
OSMOLALITY SERPL CALC.SUM OF ELEC: 285 MOSM/KG (ref 275–295)
PLATELET # BLD AUTO: 180 10(3)UL (ref 150–450)
POTASSIUM SERPL-SCNC: 4.3 MMOL/L (ref 3.5–5.1)
PROT SERPL-MCNC: 6.4 G/DL (ref 6.4–8.2)
PSA SERPL-MCNC: 2.84 NG/ML (ref ?–4)
RBC # BLD AUTO: 4.11 X10(6)UL
SODIUM SERPL-SCNC: 138 MMOL/L (ref 136–145)
TRIGL SERPL-MCNC: 115 MG/DL (ref 30–149)
VLDLC SERPL CALC-MCNC: 17 MG/DL (ref 0–30)
WBC # BLD AUTO: 3.1 X10(3) UL (ref 4–11)

## 2022-01-13 PROCEDURE — 80061 LIPID PANEL: CPT

## 2022-01-13 PROCEDURE — 82728 ASSAY OF FERRITIN: CPT

## 2022-01-13 PROCEDURE — 80053 COMPREHEN METABOLIC PANEL: CPT

## 2022-01-13 PROCEDURE — 84153 ASSAY OF PSA TOTAL: CPT

## 2022-01-13 PROCEDURE — 83521 IG LIGHT CHAINS FREE EACH: CPT

## 2022-01-13 PROCEDURE — 36415 COLL VENOUS BLD VENIPUNCTURE: CPT

## 2022-01-13 PROCEDURE — 85025 COMPLETE CBC W/AUTO DIFF WBC: CPT

## 2022-01-13 PROCEDURE — 82232 ASSAY OF BETA-2 PROTEIN: CPT

## 2022-01-13 PROCEDURE — 82784 ASSAY IGA/IGD/IGG/IGM EACH: CPT

## 2022-01-14 ENCOUNTER — OFFICE VISIT (OUTPATIENT)
Dept: HEMATOLOGY/ONCOLOGY | Facility: HOSPITAL | Age: 62
End: 2022-01-14
Attending: INTERNAL MEDICINE
Payer: COMMERCIAL

## 2022-01-14 VITALS
WEIGHT: 222 LBS | TEMPERATURE: 97 F | SYSTOLIC BLOOD PRESSURE: 108 MMHG | OXYGEN SATURATION: 100 % | RESPIRATION RATE: 16 BRPM | HEIGHT: 71.69 IN | BODY MASS INDEX: 30.4 KG/M2 | DIASTOLIC BLOOD PRESSURE: 72 MMHG | HEART RATE: 77 BPM

## 2022-01-14 DIAGNOSIS — C90.00 MULTIPLE MYELOMA NOT HAVING ACHIEVED REMISSION (HCC): Primary | ICD-10-CM

## 2022-01-14 DIAGNOSIS — C90.00 MULTIPLE PLASMACYTOMA OF BONE (HCC): ICD-10-CM

## 2022-01-14 PROCEDURE — 99214 OFFICE O/P EST MOD 30 MIN: CPT | Performed by: INTERNAL MEDICINE

## 2022-01-14 NOTE — PROGRESS NOTES
Patient is here for one month MD follow up for Multiple Myeloma. Patient is 6 months post transplant. Patient started maintenance Revlimid 10 mg daily on 12/18. He had a Zometa infusion on 12/23.  Patient to receive another series of Covid vaccines starting

## 2022-01-17 LAB
KAPPA LC FREE SER-MCNC: 3.44 MG/DL (ref 0.33–1.94)
KAPPA LC FREE/LAMBDA FREE SER NEPH: 2.61 {RATIO} (ref 0.26–1.65)
LAMBDA LC FREE SERPL-MCNC: 1.32 MG/DL (ref 0.57–2.63)

## 2022-01-17 NOTE — PROGRESS NOTES
[facility]    PATIENT'S NAME: Pedro Samari   ATTENDING PHYSICIAN: Sunil Tijerina M.D.    PATIENT ACCOUNT #: [de-identified] LOCATION: [address] [place] [address] RECORD #: [id number] YOB: 1960   DATE OF SERVICE: 01/14/2022       CANCER CE GENERAL:  He is a well-appearing male in no acute distress. VITAL SIGNS:  His performance status is 0. His weight is 222 pounds, which is down a couple of pounds. Blood pressure is 108/72, pulse 77, respiratory rate is 20, temperature is 97.3.   HEENT

## 2022-01-19 RX ORDER — LENALIDOMIDE 10 MG/1
10 CAPSULE ORAL DAILY
Qty: 21 CAPSULE | Refills: 0 | Status: SHIPPED | OUTPATIENT
Start: 2022-01-19

## 2022-01-21 ENCOUNTER — OFFICE VISIT (OUTPATIENT)
Dept: HEMATOLOGY/ONCOLOGY | Facility: HOSPITAL | Age: 62
End: 2022-01-21
Attending: INTERNAL MEDICINE
Payer: COMMERCIAL

## 2022-01-21 VITALS
DIASTOLIC BLOOD PRESSURE: 77 MMHG | TEMPERATURE: 98 F | SYSTOLIC BLOOD PRESSURE: 139 MMHG | HEART RATE: 71 BPM | BODY MASS INDEX: 30.95 KG/M2 | RESPIRATION RATE: 18 BRPM | HEIGHT: 71.69 IN | WEIGHT: 226 LBS | OXYGEN SATURATION: 100 %

## 2022-01-21 DIAGNOSIS — C90.00 MULTIPLE MYELOMA NOT HAVING ACHIEVED REMISSION (HCC): Primary | ICD-10-CM

## 2022-01-21 LAB
CALCIUM BLD-MCNC: 8.6 MG/DL (ref 8.5–10.1)
CREAT BLD-MCNC: 0.88 MG/DL

## 2022-01-21 PROCEDURE — 36415 COLL VENOUS BLD VENIPUNCTURE: CPT

## 2022-01-21 PROCEDURE — 96374 THER/PROPH/DIAG INJ IV PUSH: CPT

## 2022-01-21 PROCEDURE — 82310 ASSAY OF CALCIUM: CPT

## 2022-01-21 PROCEDURE — 82565 ASSAY OF CREATININE: CPT

## 2022-01-21 NOTE — PROGRESS NOTES
Education Record    Learner:  Patient    Disease / Diagnosis: Zometa infusion    Barriers / Limitations:  None   Comments:    Method:  Discussion   Comments:    General Topics:  Plan of care reviewed   Comments:    Outcome:  Shows understanding   Comments:

## 2022-02-01 ENCOUNTER — TELEPHONE (OUTPATIENT)
Dept: HEMATOLOGY/ONCOLOGY | Facility: HOSPITAL | Age: 62
End: 2022-02-01

## 2022-02-17 RX ORDER — LENALIDOMIDE 10 MG/1
CAPSULE ORAL
Qty: 21 CAPSULE | Refills: 0 | OUTPATIENT
Start: 2022-02-17

## 2022-02-18 ENCOUNTER — OFFICE VISIT (OUTPATIENT)
Dept: HEMATOLOGY/ONCOLOGY | Facility: HOSPITAL | Age: 62
End: 2022-02-18
Attending: INTERNAL MEDICINE
Payer: COMMERCIAL

## 2022-02-18 VITALS
HEIGHT: 71.69 IN | SYSTOLIC BLOOD PRESSURE: 123 MMHG | HEART RATE: 74 BPM | WEIGHT: 223.19 LBS | OXYGEN SATURATION: 98 % | TEMPERATURE: 98 F | BODY MASS INDEX: 30.56 KG/M2 | DIASTOLIC BLOOD PRESSURE: 79 MMHG | RESPIRATION RATE: 16 BRPM

## 2022-02-18 DIAGNOSIS — C90.00 MULTIPLE MYELOMA NOT HAVING ACHIEVED REMISSION (HCC): Primary | ICD-10-CM

## 2022-02-18 DIAGNOSIS — C90.00 MULTIPLE MYELOMA NOT HAVING ACHIEVED REMISSION (HCC): ICD-10-CM

## 2022-02-18 LAB
ALBUMIN SERPL-MCNC: 3.5 G/DL (ref 3.4–5)
ALBUMIN/GLOB SERPL: 1.1 {RATIO} (ref 1–2)
ALT SERPL-CCNC: 29 U/L
ANION GAP SERPL CALC-SCNC: 5 MMOL/L (ref 0–18)
AST SERPL-CCNC: 25 U/L (ref 15–37)
BASOPHILS # BLD AUTO: 0.08 X10(3) UL (ref 0–0.2)
BASOPHILS NFR BLD AUTO: 2.4 %
BILIRUB SERPL-MCNC: 0.7 MG/DL (ref 0.1–2)
BUN BLD-MCNC: 13 MG/DL (ref 7–18)
CALCIUM BLD-MCNC: 8.8 MG/DL (ref 8.5–10.1)
CHLORIDE SERPL-SCNC: 105 MMOL/L (ref 98–112)
CO2 SERPL-SCNC: 27 MMOL/L (ref 21–32)
CREAT BLD-MCNC: 0.94 MG/DL
EOSINOPHIL # BLD AUTO: 0.53 X10(3) UL (ref 0–0.7)
EOSINOPHIL NFR BLD AUTO: 15.6 %
ERYTHROCYTE [DISTWIDTH] IN BLOOD BY AUTOMATED COUNT: 14.6 %
FASTING STATUS PATIENT QL REPORTED: NO
GLOBULIN PLAS-MCNC: 3.2 G/DL (ref 2.8–4.4)
GLUCOSE BLD-MCNC: 76 MG/DL (ref 70–99)
HCT VFR BLD AUTO: 40.3 %
HGB BLD-MCNC: 13.1 G/DL
IGA SERPL-MCNC: 64 MG/DL (ref 70–312)
IGM SERPL-MCNC: 18.4 MG/DL (ref 43–279)
IMM GRANULOCYTES # BLD AUTO: 0.02 X10(3) UL (ref 0–1)
IMM GRANULOCYTES NFR BLD: 0.6 %
IMMUNOGLOBULIN PNL SER-MCNC: 506 MG/DL (ref 791–1643)
LYMPHOCYTES # BLD AUTO: 1.02 X10(3) UL (ref 1–4)
LYMPHOCYTES NFR BLD AUTO: 30 %
MCH RBC QN AUTO: 29.3 PG (ref 26–34)
MCHC RBC AUTO-ENTMCNC: 32.5 G/DL (ref 31–37)
MCV RBC AUTO: 90.2 FL
MONOCYTES # BLD AUTO: 0.54 X10(3) UL (ref 0.1–1)
NEUTROPHILS # BLD AUTO: 1.21 X10 (3) UL (ref 1.5–7.7)
NEUTROPHILS # BLD AUTO: 1.21 X10(3) UL (ref 1.5–7.7)
NEUTROPHILS NFR BLD AUTO: 35.5 %
OSMOLALITY SERPL CALC.SUM OF ELEC: 283 MOSM/KG (ref 275–295)
PLATELET # BLD AUTO: 141 10(3)UL (ref 150–450)
POTASSIUM SERPL-SCNC: 3.5 MMOL/L (ref 3.5–5.1)
PROT SERPL-MCNC: 6.7 G/DL (ref 6.4–8.2)
RBC # BLD AUTO: 4.47 X10(6)UL
SODIUM SERPL-SCNC: 137 MMOL/L (ref 136–145)
WBC # BLD AUTO: 3.4 X10(3) UL (ref 4–11)

## 2022-02-18 PROCEDURE — 99214 OFFICE O/P EST MOD 30 MIN: CPT | Performed by: INTERNAL MEDICINE

## 2022-02-18 PROCEDURE — 96374 THER/PROPH/DIAG INJ IV PUSH: CPT

## 2022-02-18 RX ORDER — LENALIDOMIDE 15 MG/1
15 CAPSULE ORAL DAILY
Qty: 21 CAPSULE | Refills: 0 | Status: SHIPPED | OUTPATIENT
Start: 2022-02-18 | End: 2022-03-18

## 2022-02-18 RX ORDER — MULTIVITAMIN
1 TABLET ORAL DAILY
COMMUNITY
Start: 2022-01-21

## 2022-02-18 NOTE — PROGRESS NOTES
Patient is here for MD f/u and Zometa infusion. Continues on Revlimid 10 mg daily. He saw Dr Laura Cui on Tuesday. Visit went well. He discontinued folic acid as advised by Dr Laura Cui. Eating well. Energy level is good. Not sleeping well at night.      Education Record    Learner:  Patient    Disease / Diagnosis:  Multiple myeloma     Barriers / Limitations:  None   Comments:    Method:  Discussion   Comments:    General Topics:  Plan of care reviewed   Comments:    Outcome:  Shows understanding   Comments:

## 2022-02-18 NOTE — PROGRESS NOTES
Education Record    Learner:  Patient    Disease / Diagnosis: Here for zometa infusion    Barriers / Limitations:  None   Comments:    Method:  Discussion   Comments:    General Topics:  Plan of care reviewed   Comments:    Outcome:  Shows understanding   Comments:    Zometa infused without incident per MD orders. Pt tolerated well. Declined printed AVS. Upcoming appointments reviewed. Discharged home in stable condition, no new complaints.

## 2022-02-22 LAB
ALBUMIN SERPL ELPH-MCNC: 3.87 G/DL (ref 3.75–5.21)
ALBUMIN/GLOB SERPL: 1.82 {RATIO} (ref 1–2)
ALPHA1 GLOB SERPL ELPH-MCNC: 0.34 G/DL (ref 0.19–0.46)
ALPHA2 GLOB SERPL ELPH-MCNC: 0.66 G/DL (ref 0.48–1.05)
B-GLOBULIN SERPL ELPH-MCNC: 0.62 G/DL (ref 0.68–1.23)
GAMMA GLOB SERPL ELPH-MCNC: 0.51 G/DL (ref 0.62–1.7)
KAPPA LC FREE SER-MCNC: 3.16 MG/DL (ref 0.33–1.94)
KAPPA LC FREE/LAMBDA FREE SER NEPH: 1.9 {RATIO} (ref 0.26–1.65)
LAMBDA LC FREE SERPL-MCNC: 1.66 MG/DL (ref 0.57–2.63)
PROT SERPL-MCNC: 6 G/DL (ref 6.4–8.2)

## 2022-02-22 NOTE — PROGRESS NOTES
[facility]    PATIENT'S NAME: Noe Gaona   ATTENDING PHYSICIAN: Dianne Yao M.D. PATIENT ACCOUNT #: [de-identified] LOCATION: [address] [place] [address] RECORD #: [id number] YOB: 1960   DATE OF SERVICE: 02/18/2022       CANCER CENTER PROGRESS NOTE    CHIEF COMPLAINT:  Followup for history of multiple myeloma. HISTORY OF PRESENT ILLNESS:  The patient is a 80-year-old male. 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 He had an intranasal plasmacytoma that was previously resected. He has not had increased plasma cells in a routine bone marrow. Biopsy of one of the plasmacytomas showed monosomy 13 and 14 and a 1p deletion. He was treated with RVD induction and went on to autologous transplant at [facility] with Dr. Willa Mena. He is currently on maintenance treatment with lenalidomide. He has been tolerating it well, and we are increasing the dose to 15 mg after this current cycle. He saw Dr. Reina Dyer last Tuesday. He discontinued his folic acid. His appetite is good. His energy level is good. He is sleeping well at night. He is not having excessive diarrhea. He has had no fever or chills. We did discuss Evusheld and he is a good candidate for this. We will be giving this to him within the next week or so. He continues to get his zoledronic acid on a regular basis. He is not having any dental issues. MEDICATIONS:  His current medications include acyclovir 400 mg b.i.d.; aspirin 325 mg daily; atorvastatin 40 mg daily; lenalidomide will now be 15 mg 21 days on, 7 days off; multivitamin daily; valsartan/hydrochlorothiazide 320/12.5 daily. PHYSICAL EXAMINATION:  GENERAL:  He is a well-appearing male, in no acute distress. VITAL SIGNS:  His performance status is 0. His weight is 223 pounds. He is 6 feet tall. Blood pressure is 123/79, pulse 74, respiratory rate is 20, temperature is 97.6. HEENT:  Unremarkable.   LYMPHATICS:  He has no adenopathy. LUNGS:  Clear. HEART:  Normal.  ABDOMEN:  No hepatosplenomegaly or tenderness. EXTREMITIES:  He has no clubbing, cyanosis, or edema. NEUROLOGIC:  He is intact. LABORATORY DATA:  White count is 3.4, hemoglobin 13.1, platelets are 773. His IgA is 64. IgG and IgM are both low. His chemistries are otherwise normal.    IMPRESSION:  1.   Multiple myeloma. He continues on maintenance treatment. He will at some point need another PET scan. His last was done at [facility] on November 9. It showed some mild residual uptake in some of the areas. We will continue to do this again. His last one unfortunately did not include his distal lower extremities. He will continue on the current medication and will bump up his maintenance to 15 mg since he seems to be tolerating it well. 2.   Prophylaxis. He is going to get Evusheld. He also will need to be re-immunized for COVID afterwards. The two cannot be given within 2 weeks of each other, but otherwise, there is no other contraindication to proceed. He also needs to get his regular vaccinations per Dr. Meredith Fletcher office at the direction of Dr. Genevieve Villagran. I will see him back again in approximately 1 month. He will get his next dose of Zometa at that time.     Dictated By Jamal Mendez M.D.  d: 02/21/2022 11:36:46  t: 02/22/2022 00:02:26  Job [id number]  AH/    cc: Dr. Willam Murray M.D.

## 2022-03-03 ENCOUNTER — OFFICE VISIT (OUTPATIENT)
Dept: HEMATOLOGY/ONCOLOGY | Facility: HOSPITAL | Age: 62
End: 2022-03-03
Attending: INTERNAL MEDICINE
Payer: COMMERCIAL

## 2022-03-03 VITALS
SYSTOLIC BLOOD PRESSURE: 131 MMHG | RESPIRATION RATE: 16 BRPM | HEART RATE: 62 BPM | TEMPERATURE: 97 F | OXYGEN SATURATION: 100 % | DIASTOLIC BLOOD PRESSURE: 69 MMHG

## 2022-03-03 DIAGNOSIS — C90.00 MULTIPLE MYELOMA NOT HAVING ACHIEVED REMISSION (HCC): Primary | ICD-10-CM

## 2022-03-17 NOTE — PROGRESS NOTES
Patient is here for one month MD f/u for Multiple Myeloma. Patient continues on Zometa and Revlimid. Inquiring about next PET scan. If a scan is ordered he will need Lorazepam. He is still having insomnia. He tosses and turns all night. No diarrhea or constipation. Eating well.        Education Record    Learner:  Patient    Disease / Diagnosis:  Multiple Myeloma     Barriers / Limitations:  None   Comments:    Method:  Discussion   Comments:    General Topics:  Plan of care reviewed   Comments:    Outcome:  Shows understanding   Comments:

## 2022-03-18 ENCOUNTER — OFFICE VISIT (OUTPATIENT)
Dept: HEMATOLOGY/ONCOLOGY | Facility: HOSPITAL | Age: 62
End: 2022-03-18
Attending: INTERNAL MEDICINE
Payer: COMMERCIAL

## 2022-03-18 DIAGNOSIS — C90.00 MULTIPLE MYELOMA NOT HAVING ACHIEVED REMISSION (HCC): ICD-10-CM

## 2022-03-18 DIAGNOSIS — C90.00 MULTIPLE PLASMACYTOMA OF BONE (HCC): Primary | ICD-10-CM

## 2022-03-18 DIAGNOSIS — C90.00 MULTIPLE MYELOMA NOT HAVING ACHIEVED REMISSION (HCC): Primary | ICD-10-CM

## 2022-03-18 LAB
ALBUMIN SERPL-MCNC: 3.4 G/DL (ref 3.4–5)
ALBUMIN/GLOB SERPL: 1.3 {RATIO} (ref 1–2)
ALP LIVER SERPL-CCNC: 56 U/L
ALT SERPL-CCNC: 25 U/L
ANION GAP SERPL CALC-SCNC: 1 MMOL/L (ref 0–18)
AST SERPL-CCNC: 19 U/L (ref 15–37)
B2 MICROGLOB SERPL-MCNC: 0.16 MG/DL (ref 0.11–0.25)
BASOPHILS # BLD AUTO: 0.05 X10(3) UL (ref 0–0.2)
BASOPHILS NFR BLD AUTO: 1.7 %
BILIRUB SERPL-MCNC: 0.8 MG/DL (ref 0.1–2)
BUN BLD-MCNC: 14 MG/DL (ref 7–18)
CALCIUM BLD-MCNC: 8.5 MG/DL (ref 8.5–10.1)
CALCIUM BLD-MCNC: 8.5 MG/DL (ref 8.5–10.1)
CHLORIDE SERPL-SCNC: 110 MMOL/L (ref 98–112)
CO2 SERPL-SCNC: 28 MMOL/L (ref 21–32)
CREAT BLD-MCNC: 0.94 MG/DL
CREAT BLD-MCNC: 0.94 MG/DL
DEPRECATED HBV CORE AB SER IA-ACNC: 62.4 NG/ML
EOSINOPHIL # BLD AUTO: 0.17 X10(3) UL (ref 0–0.7)
EOSINOPHIL NFR BLD AUTO: 5.8 %
ERYTHROCYTE [DISTWIDTH] IN BLOOD BY AUTOMATED COUNT: 15.1 %
FASTING STATUS PATIENT QL REPORTED: NO
GLOBULIN PLAS-MCNC: 2.7 G/DL (ref 2.8–4.4)
GLUCOSE BLD-MCNC: 81 MG/DL (ref 70–99)
HCT VFR BLD AUTO: 40.4 %
HGB BLD-MCNC: 13.2 G/DL
IGA SERPL-MCNC: 66.6 MG/DL (ref 70–312)
IGM SERPL-MCNC: 22.9 MG/DL (ref 43–279)
IMM GRANULOCYTES NFR BLD: 0.7 %
IMMUNOGLOBULIN PNL SER-MCNC: 539 MG/DL (ref 791–1643)
LYMPHOCYTES # BLD AUTO: 0.92 X10(3) UL (ref 1–4)
LYMPHOCYTES NFR BLD AUTO: 31.2 %
MCH RBC QN AUTO: 29.1 PG (ref 26–34)
MCHC RBC AUTO-ENTMCNC: 32.7 G/DL (ref 31–37)
MCV RBC AUTO: 89 FL
MONOCYTES # BLD AUTO: 0.41 X10(3) UL (ref 0.1–1)
MONOCYTES NFR BLD AUTO: 13.9 %
NEUTROPHILS # BLD AUTO: 1.38 X10 (3) UL (ref 1.5–7.7)
NEUTROPHILS # BLD AUTO: 1.38 X10(3) UL (ref 1.5–7.7)
NEUTROPHILS NFR BLD AUTO: 46.7 %
OSMOLALITY SERPL CALC.SUM OF ELEC: 288 MOSM/KG (ref 275–295)
PLATELET # BLD AUTO: 146 10(3)UL (ref 150–450)
POTASSIUM SERPL-SCNC: 4 MMOL/L (ref 3.5–5.1)
PROT SERPL-MCNC: 6.1 G/DL (ref 6.4–8.2)
RBC # BLD AUTO: 4.54 X10(6)UL
SODIUM SERPL-SCNC: 139 MMOL/L (ref 136–145)
WBC # BLD AUTO: 3 X10(3) UL (ref 4–11)

## 2022-03-18 PROCEDURE — 96374 THER/PROPH/DIAG INJ IV PUSH: CPT

## 2022-03-18 PROCEDURE — 99214 OFFICE O/P EST MOD 30 MIN: CPT | Performed by: INTERNAL MEDICINE

## 2022-03-18 RX ORDER — LORAZEPAM 1 MG/1
TABLET ORAL
Qty: 20 TABLET | Refills: 0 | Status: SHIPPED | OUTPATIENT
Start: 2022-03-18

## 2022-03-18 RX ORDER — TRAZODONE HYDROCHLORIDE 50 MG/1
50 TABLET ORAL NIGHTLY
Qty: 30 TABLET | Refills: 3 | Status: SHIPPED | OUTPATIENT
Start: 2022-03-18

## 2022-03-18 NOTE — PROGRESS NOTES
Education Record    Learner:  Patient    Disease / Diagnosis:Multiple myeloma not having achieved remission    Barriers / Limitations:  None   Comments:    Method:  Brief focused   Comments:    General Topics:  Infection, Medication, Pain, Precautions, Procedure, Side effects and symptom management, Plan of care reviewed and Fall risk and prevention   Comments:    Outcome:  Shows understanding   Comments:    Received his Zometa

## 2022-03-21 LAB
KAPPA LC FREE SER-MCNC: 2.39 MG/DL (ref 0.33–1.94)
KAPPA LC FREE/LAMBDA FREE SER NEPH: 1.78 {RATIO} (ref 0.26–1.65)
LAMBDA LC FREE SERPL-MCNC: 1.34 MG/DL (ref 0.57–2.63)

## 2022-03-21 NOTE — PROGRESS NOTES
[facility]    PATIENT'S NAME: Jn Mecrado   ATTENDING PHYSICIAN: Iris Anthony M.D. PATIENT ACCOUNT #: [de-identified] LOCATION: [address] [place] [address] RECORD #: [id number] YOB: 1960   DATE OF SERVICE: 03/18/2022       CANCER CENTER PROGRESS NOTE    CHIEF COMPLAINT:  Followup and treatment of multiple myeloma manifested as multiple plasmacytomas. HISTORY OF PRESENT ILLNESS:  The patient is a 66-year-old male. He returns today having been diagnosed with multiple plasmacytomas in peripheral bone. He initially presented with isolated intranasal plasmacytoma that was resected. He had no increase in plasma cells in routine bone marrow. He then presented with a deformed finger and had a plasmacytoma there. He is noted to have others in the peripheral bones by imaging. Biopsy of one in the distal tibia showed monosomy 13 and 14 and a 1p deletion. He was treated with RVD induction therapy and went on to autologous transplant at [facility] with Dr. Radha Keller in the summer of 2021. He is currently on maintenance therapy with lenalidomide and dexamethasone. He has been tolerating it well. He continues to also get zoledronic acid. He denies any new complaints. He returns today for a followup visit a couple months from his last.  He has been getting monthly treatment. He is still having some insomnia. He tosses and turns all night. He did see Dr. Radha Keller, and he is seeing him again in May. We are planning on a followup PET scan in May which will be 6 months from his last that was done at [facility]. His other complaint is ongoing insomnia, and I am ordering trazodone for him. MEDICATIONS:  Current medications include acyclovir 400 mg twice daily; aspirin 325 mg daily; lenalidomide 15 mg, 21 days on and 7 days off; lorazepam 1 mg p.r.n. testing;  multivitamin daily: trazodone 50 mg nightly; valsartan/hydrochlorothiazide 320/12.5.     PHYSICAL EXAMINATION:  GENERAL:  He is a well-appearing male in no acute distress. VITAL SIGNS:  His performance status is 0. His weight is 223 pounds. Blood pressure is 131/69, pulse 62, respiratory rate is 20, temperature 97. HEENT:  Unremarkable. LYMPHATICS:  He has no adenopathy. LUNGS:  Resonant to percussion, clear to auscultation. No wheezing, rales, or rhonchi. HEART:  Normal.  ABDOMEN:  No hepatosplenomegaly or tenderness. EXTREMITIES:  No clubbing, cyanosis, or edema. LABORATORY DATA:  His chemistry is remarkable for an albumin of 3.4 and otherwise normal chemistries. His iron studies were checked and these were normal.  Quantitative immunoglobins were all low. His white count is 3.0. His absolute neutrophil count is 1380. Hemoglobin is normal.  Platelets are 122. IMPRESSION:  1.   Multiple myeloma. He continues on maintenance with 15 mg of Revlimid and monthly dexamethasone. He is seeing us every 4 weeks and getting Zometa. I will see him again in 2 months. I will ask him to get a PET scan prior to the visit. 2.   COVID sensitivity. He is hypogammaglobulinemic. We are giving him Evusheld. He got the first dose, and the FDA increased the dosing, and so he is getting the second dose now. He will be retreated at 3 to 6 months based on future FDA guidance. Dictated By Brooklyn Salazar M.D.  d: 03/20/2022 06:34:51  t: 03/20/2022 17:21:56  Job [id number]  /    cc: Robert Gabriel M.D.    Dr. Juan Wilson

## 2022-04-15 ENCOUNTER — OFFICE VISIT (OUTPATIENT)
Dept: HEMATOLOGY/ONCOLOGY | Facility: HOSPITAL | Age: 62
End: 2022-04-15
Attending: INTERNAL MEDICINE
Payer: COMMERCIAL

## 2022-04-15 VITALS
DIASTOLIC BLOOD PRESSURE: 77 MMHG | TEMPERATURE: 98 F | HEIGHT: 71.69 IN | SYSTOLIC BLOOD PRESSURE: 115 MMHG | WEIGHT: 221 LBS | HEART RATE: 65 BPM | RESPIRATION RATE: 16 BRPM | BODY MASS INDEX: 30.26 KG/M2 | OXYGEN SATURATION: 99 %

## 2022-04-15 DIAGNOSIS — C90.00 MULTIPLE MYELOMA NOT HAVING ACHIEVED REMISSION (HCC): Primary | ICD-10-CM

## 2022-04-15 DIAGNOSIS — C90.00 MULTIPLE MYELOMA NOT HAVING ACHIEVED REMISSION (HCC): ICD-10-CM

## 2022-04-15 LAB
ALBUMIN SERPL-MCNC: 3.5 G/DL (ref 3.4–5)
ALBUMIN/GLOB SERPL: 1.3 {RATIO} (ref 1–2)
ALP LIVER SERPL-CCNC: 50 U/L
ALT SERPL-CCNC: 27 U/L
ANION GAP SERPL CALC-SCNC: 5 MMOL/L (ref 0–18)
AST SERPL-CCNC: 20 U/L (ref 15–37)
B2 MICROGLOB SERPL-MCNC: 0.16 MG/DL (ref 0.11–0.25)
BASOPHILS # BLD AUTO: 0.07 X10(3) UL (ref 0–0.2)
BASOPHILS NFR BLD AUTO: 2.5 %
BILIRUB SERPL-MCNC: 0.7 MG/DL (ref 0.1–2)
BUN BLD-MCNC: 13 MG/DL (ref 7–18)
CALCIUM BLD-MCNC: 9 MG/DL (ref 8.5–10.1)
CALCIUM BLD-MCNC: 9 MG/DL (ref 8.5–10.1)
CHLORIDE SERPL-SCNC: 107 MMOL/L (ref 98–112)
CO2 SERPL-SCNC: 28 MMOL/L (ref 21–32)
CREAT BLD-MCNC: 1.01 MG/DL
CREAT BLD-MCNC: 1.01 MG/DL
DEPRECATED HBV CORE AB SER IA-ACNC: 48.2 NG/ML
EOSINOPHIL # BLD AUTO: 0.15 X10(3) UL (ref 0–0.7)
EOSINOPHIL NFR BLD AUTO: 5.4 %
ERYTHROCYTE [DISTWIDTH] IN BLOOD BY AUTOMATED COUNT: 15.8 %
GLOBULIN PLAS-MCNC: 2.7 G/DL (ref 2.8–4.4)
GLUCOSE BLD-MCNC: 102 MG/DL (ref 70–99)
HCT VFR BLD AUTO: 41.1 %
HGB BLD-MCNC: 13.8 G/DL
IGA SERPL-MCNC: 69.9 MG/DL (ref 70–312)
IGM SERPL-MCNC: 14.3 MG/DL (ref 43–279)
IMM GRANULOCYTES # BLD AUTO: 0.01 X10(3) UL (ref 0–1)
IMM GRANULOCYTES NFR BLD: 0.4 %
IMMUNOGLOBULIN PNL SER-MCNC: 518 MG/DL (ref 791–1643)
LYMPHOCYTES # BLD AUTO: 0.71 X10(3) UL (ref 1–4)
LYMPHOCYTES NFR BLD AUTO: 25.6 %
MCH RBC QN AUTO: 29.3 PG (ref 26–34)
MCHC RBC AUTO-ENTMCNC: 33.6 G/DL (ref 31–37)
MCV RBC AUTO: 87.3 FL
MONOCYTES # BLD AUTO: 0.39 X10(3) UL (ref 0.1–1)
MONOCYTES NFR BLD AUTO: 14.1 %
NEUTROPHILS # BLD AUTO: 1.44 X10 (3) UL (ref 1.5–7.7)
NEUTROPHILS # BLD AUTO: 1.44 X10(3) UL (ref 1.5–7.7)
NEUTROPHILS NFR BLD AUTO: 52 %
OSMOLALITY SERPL CALC.SUM OF ELEC: 290 MOSM/KG (ref 275–295)
PLATELET # BLD AUTO: 149 10(3)UL (ref 150–450)
POTASSIUM SERPL-SCNC: 3.8 MMOL/L (ref 3.5–5.1)
PROT SERPL-MCNC: 6.2 G/DL (ref 6.4–8.2)
RBC # BLD AUTO: 4.71 X10(6)UL
SODIUM SERPL-SCNC: 140 MMOL/L (ref 136–145)
WBC # BLD AUTO: 2.8 X10(3) UL (ref 4–11)

## 2022-04-15 PROCEDURE — 96374 THER/PROPH/DIAG INJ IV PUSH: CPT

## 2022-04-15 NOTE — PROGRESS NOTES
Education Record    Learner:  Patient    Disease / Diagnosis: MM: zometa     Barriers / Limitations:  None   Comments:    Method:  Brief focused and Discussion   Comments:    General Topics:  Side effects and symptom management   Comments:    Outcome:  Shows understanding   Comments:

## 2022-04-18 ENCOUNTER — TELEPHONE (OUTPATIENT)
Dept: HEMATOLOGY/ONCOLOGY | Facility: HOSPITAL | Age: 62
End: 2022-04-18

## 2022-04-18 LAB
KAPPA LC FREE SER-MCNC: 2.36 MG/DL (ref 0.33–1.94)
KAPPA LC FREE/LAMBDA FREE SER NEPH: 2.01 {RATIO} (ref 0.26–1.65)
LAMBDA LC FREE SERPL-MCNC: 1.17 MG/DL (ref 0.57–2.63)

## 2022-04-18 NOTE — TELEPHONE ENCOUNTER
Johanne Nolasco from [facility] called regarding cell gene authorization number for the survey  is flagged. He said Dr. Babar Montiel needs to call the patient and cell gene. Lucio cannot dispense medication until resolved.

## 2022-04-26 ENCOUNTER — HOSPITAL ENCOUNTER (OUTPATIENT)
Dept: NUCLEAR MEDICINE | Facility: HOSPITAL | Age: 62
Discharge: HOME | End: 2022-04-26
Attending: INTERNAL MEDICINE
Payer: COMMERCIAL

## 2022-04-26 DIAGNOSIS — C90.00 MULTIPLE MYELOMA NOT HAVING ACHIEVED REMISSION (HCC): ICD-10-CM

## 2022-04-26 LAB — GLUCOSE BLD-MCNC: 89 MG/DL (ref 70–99)

## 2022-04-26 PROCEDURE — 78816 PET IMAGE W/CT FULL BODY: CPT | Performed by: INTERNAL MEDICINE

## 2022-04-26 PROCEDURE — 82962 GLUCOSE BLOOD TEST: CPT

## 2022-05-13 ENCOUNTER — APPOINTMENT (OUTPATIENT)
Dept: HEMATOLOGY/ONCOLOGY | Facility: HOSPITAL | Age: 62
End: 2022-05-13
Attending: INTERNAL MEDICINE
Payer: COMMERCIAL

## 2022-05-18 RX ORDER — LENALIDOMIDE 15 MG/1
15 CAPSULE ORAL DAILY
Qty: 21 CAPSULE | Refills: 0 | Status: SHIPPED | OUTPATIENT
Start: 2022-05-18

## 2022-05-24 ENCOUNTER — OFFICE VISIT (OUTPATIENT)
Dept: HEMATOLOGY/ONCOLOGY | Facility: HOSPITAL | Age: 62
End: 2022-05-24
Attending: INTERNAL MEDICINE
Payer: COMMERCIAL

## 2022-05-24 VITALS
DIASTOLIC BLOOD PRESSURE: 78 MMHG | TEMPERATURE: 99 F | BODY MASS INDEX: 30.81 KG/M2 | HEIGHT: 71.69 IN | HEART RATE: 96 BPM | WEIGHT: 225 LBS | SYSTOLIC BLOOD PRESSURE: 118 MMHG | OXYGEN SATURATION: 96 % | RESPIRATION RATE: 16 BRPM

## 2022-05-24 DIAGNOSIS — C90.00 MULTIPLE MYELOMA NOT HAVING ACHIEVED REMISSION (HCC): Primary | ICD-10-CM

## 2022-05-24 DIAGNOSIS — C90.00 MULTIPLE PLASMACYTOMA OF BONE (HCC): ICD-10-CM

## 2022-05-24 PROCEDURE — 96374 THER/PROPH/DIAG INJ IV PUSH: CPT

## 2022-05-24 PROCEDURE — 99214 OFFICE O/P EST MOD 30 MIN: CPT | Performed by: INTERNAL MEDICINE

## 2022-05-24 NOTE — PROGRESS NOTES
Patient is here for MD f/u for Multiple myeloma and Zometa infusion. Continues on Revlimid 15 mg daily. Patient saw Dr Everardo Ribera last week. Labs done at that visit. Visit went well. Patient had a PET scan on 4/26. Denies pain. Eating well. Energy level is generally good. Problems with insomnia. He tried Trazadone and did not got any relief. Denies any GI complaints. Patient has questions about upcoming vaccines.       Education Record    Learner:  Patient    Disease / Diagnosis:  Multiple myeloma     Barriers / Limitations:  None   Comments:    Method:  Discussion   Comments:    General Topics:  Plan of care reviewed   Comments:    Outcome:  Shows understanding   Comments:

## 2022-05-24 NOTE — PROGRESS NOTES
Education Record    Learner:  Patient    Disease / Diagnosis: multiple myeloma: zometa     Barriers / Limitations:  None   Comments:    Method:  Brief focused and Discussion   Comments:    General Topics:  Precautions and Side effects and symptom management   Comments:    Outcome:  Shows understanding   Comments:

## 2022-06-14 RX ORDER — LENALIDOMIDE 15 MG/1
15 CAPSULE ORAL DAILY
Qty: 21 CAPSULE | Refills: 0 | Status: SHIPPED | OUTPATIENT
Start: 2022-06-14

## 2022-06-21 ENCOUNTER — OFFICE VISIT (OUTPATIENT)
Dept: HEMATOLOGY/ONCOLOGY | Facility: HOSPITAL | Age: 62
End: 2022-06-21
Attending: INTERNAL MEDICINE
Payer: COMMERCIAL

## 2022-06-21 VITALS
DIASTOLIC BLOOD PRESSURE: 71 MMHG | BODY MASS INDEX: 31.09 KG/M2 | OXYGEN SATURATION: 97 % | HEART RATE: 67 BPM | TEMPERATURE: 98 F | SYSTOLIC BLOOD PRESSURE: 107 MMHG | HEIGHT: 71.69 IN | RESPIRATION RATE: 18 BRPM | WEIGHT: 227 LBS

## 2022-06-21 DIAGNOSIS — C90.00 MULTIPLE MYELOMA NOT HAVING ACHIEVED REMISSION (HCC): Primary | ICD-10-CM

## 2022-06-21 LAB
ALBUMIN SERPL-MCNC: 3.3 G/DL (ref 3.4–5)
ALBUMIN SERPL-MCNC: 3.3 G/DL (ref 3.4–5)
ALBUMIN/GLOB SERPL: 1.4 {RATIO} (ref 1–2)
ALP LIVER SERPL-CCNC: 53 U/L
ALT SERPL-CCNC: 27 U/L
ANION GAP SERPL CALC-SCNC: 4 MMOL/L (ref 0–18)
AST SERPL-CCNC: 16 U/L (ref 15–37)
BASOPHILS # BLD AUTO: 0.05 X10(3) UL (ref 0–0.2)
BASOPHILS NFR BLD AUTO: 1.6 %
BILIRUB SERPL-MCNC: 0.7 MG/DL (ref 0.1–2)
BUN BLD-MCNC: 12 MG/DL (ref 7–18)
CALCIUM BLD-MCNC: 8.5 MG/DL (ref 8.5–10.1)
CALCIUM BLD-MCNC: 8.6 MG/DL (ref 8.5–10.1)
CHLORIDE SERPL-SCNC: 108 MMOL/L (ref 98–112)
CO2 SERPL-SCNC: 27 MMOL/L (ref 21–32)
CREAT BLD-MCNC: 0.86 MG/DL
CREAT BLD-MCNC: 0.93 MG/DL
EOSINOPHIL # BLD AUTO: 0.14 X10(3) UL (ref 0–0.7)
EOSINOPHIL NFR BLD AUTO: 4.5 %
ERYTHROCYTE [DISTWIDTH] IN BLOOD BY AUTOMATED COUNT: 15.3 %
GLOBULIN PLAS-MCNC: 2.4 G/DL (ref 2.8–4.4)
GLUCOSE BLD-MCNC: 91 MG/DL (ref 70–99)
HCT VFR BLD AUTO: 38.4 %
HGB BLD-MCNC: 12.8 G/DL
IGA SERPL-MCNC: 72.1 MG/DL (ref 70–312)
IGM SERPL-MCNC: 10.4 MG/DL (ref 43–279)
IMM GRANULOCYTES # BLD AUTO: 0.03 X10(3) UL (ref 0–1)
IMM GRANULOCYTES NFR BLD: 1 %
IMMUNOGLOBULIN PNL SER-MCNC: 508 MG/DL (ref 791–1643)
LDH SERPL L TO P-CCNC: 150 U/L
LYMPHOCYTES # BLD AUTO: 0.69 X10(3) UL (ref 1–4)
LYMPHOCYTES NFR BLD AUTO: 22.2 %
MCH RBC QN AUTO: 30.3 PG (ref 26–34)
MCHC RBC AUTO-ENTMCNC: 33.3 G/DL (ref 31–37)
MCV RBC AUTO: 90.8 FL
MONOCYTES # BLD AUTO: 0.6 X10(3) UL (ref 0.1–1)
MONOCYTES NFR BLD AUTO: 19.3 %
NEUTROPHILS # BLD AUTO: 1.6 X10 (3) UL (ref 1.5–7.7)
NEUTROPHILS # BLD AUTO: 1.6 X10(3) UL (ref 1.5–7.7)
NEUTROPHILS NFR BLD AUTO: 51.4 %
OSMOLALITY SERPL CALC.SUM OF ELEC: 287 MOSM/KG (ref 275–295)
PLATELET # BLD AUTO: 115 10(3)UL (ref 150–450)
POTASSIUM SERPL-SCNC: 3.8 MMOL/L (ref 3.5–5.1)
PROT SERPL-MCNC: 5.7 G/DL (ref 6.4–8.2)
RBC # BLD AUTO: 4.23 X10(6)UL
SODIUM SERPL-SCNC: 139 MMOL/L (ref 136–145)
WBC # BLD AUTO: 3.1 X10(3) UL (ref 4–11)

## 2022-06-21 PROCEDURE — 83615 LACTATE (LD) (LDH) ENZYME: CPT

## 2022-06-21 PROCEDURE — 36415 COLL VENOUS BLD VENIPUNCTURE: CPT

## 2022-06-21 PROCEDURE — 83521 IG LIGHT CHAINS FREE EACH: CPT

## 2022-06-21 PROCEDURE — 82310 ASSAY OF CALCIUM: CPT

## 2022-06-21 PROCEDURE — 84165 PROTEIN E-PHORESIS SERUM: CPT

## 2022-06-21 PROCEDURE — 85025 COMPLETE CBC W/AUTO DIFF WBC: CPT

## 2022-06-21 PROCEDURE — 82784 ASSAY IGA/IGD/IGG/IGM EACH: CPT

## 2022-06-21 PROCEDURE — 86334 IMMUNOFIX E-PHORESIS SERUM: CPT

## 2022-06-21 PROCEDURE — 82040 ASSAY OF SERUM ALBUMIN: CPT | Performed by: INTERNAL MEDICINE

## 2022-06-21 PROCEDURE — 80053 COMPREHEN METABOLIC PANEL: CPT

## 2022-06-21 PROCEDURE — 96374 THER/PROPH/DIAG INJ IV PUSH: CPT

## 2022-06-21 PROCEDURE — 82565 ASSAY OF CREATININE: CPT

## 2022-06-29 LAB
ALBUMIN SERPL ELPH-MCNC: 3.64 G/DL (ref 3.75–5.21)
ALBUMIN/GLOB SERPL: 1.95 {RATIO} (ref 1–2)
ALPHA1 GLOB SERPL ELPH-MCNC: 0.27 G/DL (ref 0.19–0.46)
ALPHA2 GLOB SERPL ELPH-MCNC: 0.57 G/DL (ref 0.48–1.05)
B-GLOBULIN SERPL ELPH-MCNC: 0.57 G/DL (ref 0.68–1.23)
GAMMA GLOB SERPL ELPH-MCNC: 0.46 G/DL (ref 0.62–1.7)
KAPPA LC FREE SER-MCNC: 2.5 MG/DL (ref 0.33–1.94)
KAPPA LC FREE/LAMBDA FREE SER NEPH: 1.67 {RATIO} (ref 0.26–1.65)
LAMBDA LC FREE SERPL-MCNC: 1.49 MG/DL (ref 0.57–2.63)
PROT SERPL-MCNC: 5.5 G/DL (ref 6.4–8.2)

## 2022-07-13 RX ORDER — LENALIDOMIDE 15 MG/1
CAPSULE ORAL
Qty: 21 CAPSULE | Refills: 0 | Status: SHIPPED | OUTPATIENT
Start: 2022-07-13 | End: 2022-08-10

## 2022-07-19 ENCOUNTER — APPOINTMENT (OUTPATIENT)
Dept: HEMATOLOGY/ONCOLOGY | Facility: HOSPITAL | Age: 62
End: 2022-07-19
Attending: INTERNAL MEDICINE
Payer: COMMERCIAL

## 2022-07-22 ENCOUNTER — OFFICE VISIT (OUTPATIENT)
Dept: HEMATOLOGY/ONCOLOGY | Facility: HOSPITAL | Age: 62
End: 2022-07-22
Attending: INTERNAL MEDICINE
Payer: COMMERCIAL

## 2022-07-22 VITALS
RESPIRATION RATE: 16 BRPM | TEMPERATURE: 97 F | HEIGHT: 71.69 IN | DIASTOLIC BLOOD PRESSURE: 64 MMHG | SYSTOLIC BLOOD PRESSURE: 104 MMHG | OXYGEN SATURATION: 97 % | HEART RATE: 52 BPM | BODY MASS INDEX: 31.09 KG/M2 | WEIGHT: 227 LBS

## 2022-07-22 DIAGNOSIS — C90.00 MULTIPLE MYELOMA NOT HAVING ACHIEVED REMISSION (HCC): Primary | ICD-10-CM

## 2022-07-22 DIAGNOSIS — D70.1 LEUKOPENIA DUE TO ANTINEOPLASTIC CHEMOTHERAPY (HCC): ICD-10-CM

## 2022-07-22 DIAGNOSIS — T45.1X5A LEUKOPENIA DUE TO ANTINEOPLASTIC CHEMOTHERAPY (HCC): ICD-10-CM

## 2022-07-22 DIAGNOSIS — Z51.11 ENCOUNTER FOR CHEMOTHERAPY MANAGEMENT: ICD-10-CM

## 2022-07-22 PROBLEM — M1A.0720 CHRONIC IDIOPATHIC GOUT INVOLVING TOE OF LEFT FOOT: Status: RESOLVED | Noted: 2019-05-15 | Resolved: 2022-07-22

## 2022-07-22 PROBLEM — R06.83 SNORING: Status: RESOLVED | Noted: 2018-08-14 | Resolved: 2022-07-22

## 2022-07-22 PROBLEM — R94.39 ABNORMAL NUCLEAR STRESS TEST: Status: RESOLVED | Noted: 2018-08-23 | Resolved: 2022-07-22

## 2022-07-22 LAB
ALBUMIN SERPL-MCNC: 3.3 G/DL (ref 3.4–5)
ALBUMIN/GLOB SERPL: 1.2 {RATIO} (ref 1–2)
ALP LIVER SERPL-CCNC: 61 U/L
ALT SERPL-CCNC: 30 U/L
ANION GAP SERPL CALC-SCNC: 6 MMOL/L (ref 0–18)
AST SERPL-CCNC: 7 U/L (ref 15–37)
BASOPHILS # BLD AUTO: 0.03 X10(3) UL (ref 0–0.2)
BASOPHILS NFR BLD AUTO: 1.1 %
BILIRUB SERPL-MCNC: 0.5 MG/DL (ref 0.1–2)
BUN BLD-MCNC: 12 MG/DL (ref 7–18)
CALCIUM BLD-MCNC: 8.8 MG/DL (ref 8.5–10.1)
CALCIUM BLD-MCNC: 8.8 MG/DL (ref 8.5–10.1)
CHLORIDE SERPL-SCNC: 110 MMOL/L (ref 98–112)
CO2 SERPL-SCNC: 25 MMOL/L (ref 21–32)
CREAT BLD-MCNC: 0.92 MG/DL
CREAT BLD-MCNC: 0.92 MG/DL
EOSINOPHIL # BLD AUTO: 0.21 X10(3) UL (ref 0–0.7)
EOSINOPHIL NFR BLD AUTO: 7.4 %
ERYTHROCYTE [DISTWIDTH] IN BLOOD BY AUTOMATED COUNT: 14.6 %
GLOBULIN PLAS-MCNC: 2.7 G/DL (ref 2.8–4.4)
GLUCOSE BLD-MCNC: 99 MG/DL (ref 70–99)
HCT VFR BLD AUTO: 39 %
HGB BLD-MCNC: 13 G/DL
IGA SERPL-MCNC: 75.3 MG/DL (ref 70–312)
IGM SERPL-MCNC: 10.2 MG/DL (ref 43–279)
IMM GRANULOCYTES # BLD AUTO: 0.02 X10(3) UL (ref 0–1)
IMM GRANULOCYTES NFR BLD: 0.7 %
IMMUNOGLOBULIN PNL SER-MCNC: 506 MG/DL (ref 791–1643)
LDH SERPL L TO P-CCNC: 154 U/L
LYMPHOCYTES # BLD AUTO: 0.66 X10(3) UL (ref 1–4)
LYMPHOCYTES NFR BLD AUTO: 23.3 %
MCH RBC QN AUTO: 30.2 PG (ref 26–34)
MCHC RBC AUTO-ENTMCNC: 33.3 G/DL (ref 31–37)
MCV RBC AUTO: 90.7 FL
MONOCYTES # BLD AUTO: 0.65 X10(3) UL (ref 0.1–1)
MONOCYTES NFR BLD AUTO: 23 %
NEUTROPHILS # BLD AUTO: 1.26 X10 (3) UL (ref 1.5–7.7)
NEUTROPHILS # BLD AUTO: 1.26 X10(3) UL (ref 1.5–7.7)
NEUTROPHILS NFR BLD AUTO: 44.5 %
OSMOLALITY SERPL CALC.SUM OF ELEC: 292 MOSM/KG (ref 275–295)
PLATELET # BLD AUTO: 141 10(3)UL (ref 150–450)
POTASSIUM SERPL-SCNC: 3.9 MMOL/L (ref 3.5–5.1)
PROT SERPL-MCNC: 6 G/DL (ref 6.4–8.2)
RBC # BLD AUTO: 4.3 X10(6)UL
SODIUM SERPL-SCNC: 141 MMOL/L (ref 136–145)
WBC # BLD AUTO: 2.8 X10(3) UL (ref 4–11)

## 2022-07-22 PROCEDURE — 96374 THER/PROPH/DIAG INJ IV PUSH: CPT

## 2022-07-22 PROCEDURE — 99214 OFFICE O/P EST MOD 30 MIN: CPT | Performed by: NURSE PRACTITIONER

## 2022-07-22 RX ORDER — LATANOPROST 50 UG/ML
1 SOLUTION/ DROPS OPHTHALMIC NIGHTLY
COMMUNITY
Start: 2022-06-20

## 2022-07-22 NOTE — PROGRESS NOTES
Education Record    Learner:  Patient    Disease / Diagnosis:Multiple myeloma not having achieved remission    Barriers / Limitations:  None   Comments:    Method:  Brief focused   Comments:    General Topics:  Infection, Medication, Pain, Precautions, Procedure, Side effects and symptom management, Plan of care reviewed and Fall risk and prevention   Comments:    Outcome:  Shows understanding   Comments: Tolerated well his ZOmeta.  Given new AVS

## 2022-07-22 NOTE — PROGRESS NOTES
Patient is here for APN assessment and Zometa infusion. Overall patient feels well. He had a follow up with Dr Derek Oliva on Tuesday. He received a covid booster on Monday and DTAP, Pneumococcal and HIB on Tuesday. Appetite is good. Occasional fatigue and SOB with exertion. Continues on Revlimid 15 mg daily. Tolerating well.        Education Record    Learner:  Patient    Disease / Diagnosis:  Multiple myeloma     Barriers / Limitations:  None   Comments:    Method:  Discussion   Comments:    General Topics:  Plan of care reviewed   Comments:    Outcome:  Shows understanding   Comments:

## 2022-07-30 ENCOUNTER — PATIENT MESSAGE (OUTPATIENT)
Dept: HEMATOLOGY/ONCOLOGY | Facility: HOSPITAL | Age: 62
End: 2022-07-30

## 2022-07-30 DIAGNOSIS — Z12.5 SCREENING FOR PROSTATE CANCER: Primary | ICD-10-CM

## 2022-08-01 NOTE — TELEPHONE ENCOUNTER
From: Yuan Lacey  To: Christian Dawson MD  Sent: 7/30/2022 6:07 AM CDT  Subject: Esvin Hammond you are feeling better.  Favor to ask - I need a PSA test prior to going to the urologist - would you be willing to add this to my next blood draw/tests in August?    Thanks  Darrin Barnard

## 2022-08-03 LAB
ALBUMIN SERPL ELPH-MCNC: 3.56 G/DL (ref 3.75–5.21)
ALBUMIN/GLOB SERPL: 1.74 {RATIO} (ref 1–2)
ALPHA1 GLOB SERPL ELPH-MCNC: 0.32 G/DL (ref 0.19–0.46)
ALPHA2 GLOB SERPL ELPH-MCNC: 0.69 G/DL (ref 0.48–1.05)
B-GLOBULIN SERPL ELPH-MCNC: 0.58 G/DL (ref 0.68–1.23)
GAMMA GLOB SERPL ELPH-MCNC: 0.45 G/DL (ref 0.62–1.7)
KAPPA LC FREE SER-MCNC: 2.53 MG/DL (ref 0.33–1.94)
KAPPA LC FREE/LAMBDA FREE SER NEPH: 1.77 {RATIO} (ref 0.26–1.65)
LAMBDA LC FREE SERPL-MCNC: 1.43 MG/DL (ref 0.57–2.63)
PROT SERPL-MCNC: 5.6 G/DL (ref 6.4–8.2)

## 2022-08-10 RX ORDER — LENALIDOMIDE 15 MG/1
CAPSULE ORAL
Qty: 21 CAPSULE | Refills: 0 | Status: SHIPPED | OUTPATIENT
Start: 2022-08-10

## 2022-08-10 RX ORDER — LENALIDOMIDE 15 MG/1
CAPSULE ORAL
Qty: 21 CAPSULE | Refills: 0 | OUTPATIENT
Start: 2022-08-10

## 2022-08-19 ENCOUNTER — APPOINTMENT (OUTPATIENT)
Dept: HEMATOLOGY/ONCOLOGY | Facility: HOSPITAL | Age: 62
End: 2022-08-19
Attending: INTERNAL MEDICINE
Payer: COMMERCIAL

## 2022-08-19 VITALS
OXYGEN SATURATION: 98 % | WEIGHT: 230 LBS | HEART RATE: 88 BPM | SYSTOLIC BLOOD PRESSURE: 115 MMHG | DIASTOLIC BLOOD PRESSURE: 73 MMHG | RESPIRATION RATE: 16 BRPM | TEMPERATURE: 98 F | HEIGHT: 71.69 IN | BODY MASS INDEX: 31.5 KG/M2

## 2022-08-19 DIAGNOSIS — C90.00 MULTIPLE MYELOMA NOT HAVING ACHIEVED REMISSION (HCC): Primary | ICD-10-CM

## 2022-08-19 DIAGNOSIS — C90.30 PLASMACYTOMA NOT HAVING ACHIEVED REMISSION, UNSPECIFIED PLASMACYTOMA TYPE (HCC): ICD-10-CM

## 2022-08-19 DIAGNOSIS — Z12.5 SCREENING FOR PROSTATE CANCER: ICD-10-CM

## 2022-08-19 LAB
ALBUMIN SERPL-MCNC: 3.4 G/DL (ref 3.4–5)
ALBUMIN/GLOB SERPL: 1.2 {RATIO} (ref 1–2)
ALP LIVER SERPL-CCNC: 52 U/L
ALT SERPL-CCNC: 36 U/L
ANION GAP SERPL CALC-SCNC: 6 MMOL/L (ref 0–18)
AST SERPL-CCNC: 16 U/L (ref 15–37)
BASOPHILS # BLD AUTO: 0.06 X10(3) UL (ref 0–0.2)
BASOPHILS NFR BLD AUTO: 1.7 %
BILIRUB SERPL-MCNC: 0.8 MG/DL (ref 0.1–2)
BUN BLD-MCNC: 12 MG/DL (ref 7–18)
CALCIUM BLD-MCNC: 8.6 MG/DL (ref 8.5–10.1)
CHLORIDE SERPL-SCNC: 107 MMOL/L (ref 98–112)
CO2 SERPL-SCNC: 26 MMOL/L (ref 21–32)
COMPLEXED PSA SERPL-MCNC: 2.31 NG/ML (ref ?–4)
CREAT BLD-MCNC: 1 MG/DL
EOSINOPHIL # BLD AUTO: 0.31 X10(3) UL (ref 0–0.7)
EOSINOPHIL NFR BLD AUTO: 8.8 %
ERYTHROCYTE [DISTWIDTH] IN BLOOD BY AUTOMATED COUNT: 14.6 %
FASTING STATUS PATIENT QL REPORTED: NO
GFR SERPLBLD BASED ON 1.73 SQ M-ARVRAT: 86 ML/MIN/1.73M2 (ref 60–?)
GLOBULIN PLAS-MCNC: 2.8 G/DL (ref 2.8–4.4)
GLUCOSE BLD-MCNC: 115 MG/DL (ref 70–99)
HCT VFR BLD AUTO: 39.6 %
HGB BLD-MCNC: 13.4 G/DL
IGA SERPL-MCNC: 85.2 MG/DL (ref 70–312)
IGM SERPL-MCNC: 13.1 MG/DL (ref 43–279)
IMM GRANULOCYTES # BLD AUTO: 0.03 X10(3) UL (ref 0–1)
IMM GRANULOCYTES NFR BLD: 0.8 %
IMMUNOGLOBULIN PNL SER-MCNC: 549 MG/DL (ref 791–1643)
LDH SERPL L TO P-CCNC: 146 U/L
LYMPHOCYTES # BLD AUTO: 0.84 X10(3) UL (ref 1–4)
LYMPHOCYTES NFR BLD AUTO: 23.7 %
MCH RBC QN AUTO: 30.4 PG (ref 26–34)
MCHC RBC AUTO-ENTMCNC: 33.8 G/DL (ref 31–37)
MCV RBC AUTO: 89.8 FL
MONOCYTES # BLD AUTO: 0.63 X10(3) UL (ref 0.1–1)
MONOCYTES NFR BLD AUTO: 17.8 %
NEUTROPHILS # BLD AUTO: 1.67 X10 (3) UL (ref 1.5–7.7)
NEUTROPHILS # BLD AUTO: 1.67 X10(3) UL (ref 1.5–7.7)
NEUTROPHILS NFR BLD AUTO: 47.2 %
OSMOLALITY SERPL CALC.SUM OF ELEC: 289 MOSM/KG (ref 275–295)
PLATELET # BLD AUTO: 139 10(3)UL (ref 150–450)
POTASSIUM SERPL-SCNC: 3.5 MMOL/L (ref 3.5–5.1)
PROT SERPL-MCNC: 6.2 G/DL (ref 6.4–8.2)
RBC # BLD AUTO: 4.41 X10(6)UL
SODIUM SERPL-SCNC: 139 MMOL/L (ref 136–145)
WBC # BLD AUTO: 3.5 X10(3) UL (ref 4–11)

## 2022-08-19 PROCEDURE — 99214 OFFICE O/P EST MOD 30 MIN: CPT | Performed by: INTERNAL MEDICINE

## 2022-08-19 PROCEDURE — 96365 THER/PROPH/DIAG IV INF INIT: CPT

## 2022-08-19 NOTE — PROGRESS NOTES
Patient is here today for follow up with   for Multiple Myeloma, Plasmacytoma. Patient denies pain. Current treatment Revlimid 15mg daily days 1 - 21 followed by one week off (is on day 21 today). Zometa today. Medication list,medical history and toxicities were reviewed and updated. Education Record    Learner:  Patient      Disease / Diagnosis: Multiple Myeloma, Plasmacytoma    Barriers / Limitations:  None   Comments:    Method:  Brief focused, Discussion, Printed material and Reinforcement   Comments:    General Topics:  Medication, Pain, Procedure and Plan of care reviewed   Comments:    Outcome:  Shows understanding   Comments:    Anette Sandhoff MD visit. Patient sent to waiting room - treating RN notified. AVS provided and follow up reviewed. Patient instructed to call as needed.

## 2022-08-19 NOTE — PROGRESS NOTES
Education Record    Learner:  Patient    Disease / Diagnosis: Myeloma    Barriers / Limitations:  None   Comments:    Method:  Brief focused   Comments:    General Topics:  Plan of care reviewed   Comments:    Outcome:  Shows understanding   Comments:

## 2022-08-22 NOTE — PROGRESS NOTES
[facility]    PATIENT'S NAME: Jn Mercado   ATTENDING PHYSICIAN: Iris Anthony M.D. PATIENT ACCOUNT #: [de-identified] LOCATION: [address] [place] [address] RECORD #: [id number] YOB: 1960   DATE OF SERVICE: 08/19/2022       CANCER CENTER PROGRESS NOTE    CHIEF COMPLAINT:  Followup and treatment of multiple myeloma. HISTORY OF PRESENT ILLNESS:  The patient is a 44-year-old male. He initially presented with an extramedullary plasmacytoma and nasal polyp. He had no evidence of a myeloma in other sites at that time. He subsequently presented with injury to his fifth digit on his hand with deviation of his interphalangeal joint. He was found to have a lytic lesion at that site. He then had extensive bone assessment and he had scattered peripheral lytic lesions, none of which involved the axial skeleton. He had biopsy of a tibial lesion and he was found to have a plasmacytoma. He was diagnosed with multiple plasmacytomas, and he had no significant paraproteins. He had IgG lambda monoclonal plasma cells diagnosed. He had monosomy 13 and monosomy 14 and a 1p deletion. He ended up getting RVD induction. He has subsequently had stem cell transplant in July 2021. He is now following up with us and receiving maintenance with zoledronic acid, lenalidomide 15 mg days 1 through 21 followed by a week off, monthly dexamethasone. He is reasonably active. He saw Dr. Radha Keller earlier in the month. He is up to date with regard to all of his other screening. He is getting colonoscopy reasonably soon with Dr. Maricel Hernández from [facility]. He is active. He is swimming multiple times a week. He is not having any specific bone pain. He has never pursued having his finger operated on. It is still deviated but it does not get in the way. He is also following through with his dentist for regular dental exams. He is getting his immunizations and he did receive Evusheld from us.   He will be due for his next Evusheld injection September 2. He has not had clinical COVID. MEDICATIONS:  His current medications include acyclovir 400 mg b.i.d., aspirin 325 mg daily, atorvastatin 40 mg nightly, latanoprost ophthalmic solution left eye nightly, lenalidomide 15 mg 21 days on and 7 days off, lorazepam 1 mg prior to exams, multivitamin daily, and valsartan/hydrochlorothiazide 320/12.5 daily. PHYSICAL EXAMINATION:    GENERAL:  He is a well-developed male in no acute distress. VITAL SIGNS:  His performance status is 0. He is 6 feet tall. Weight is 230 pounds. Blood pressure is 115/73, pulse 88. Respiratory rate is 20. Temperature is 98. 3. HEENT:  Unremarkable. LYMPHATICS:  He has no adenopathy. LUNGS:  Clear. HEART:  Normal.  ABDOMEN:  No hepatosplenomegaly or tenderness. EXTREMITIES:  He has no clubbing, cyanosis, or edema. NEUROLOGIC:  He is intact. LABORATORY DATA:  He has relative hypogammaglobulinemia with an IgA of 85, IgG of 549 and IgM of 131. His last light chains were done July 22 and this kappa light chain was 2.54 which is slightly elevated. His CBC is remarkable for mild leukopenia and mild thrombocytopenia. He has no detectable M spike on prior immunoelectrophoresis. IMPRESSION:  Multiple myeloma. He presented as multiple plasmacytomas without diffuse marrow involvement. These were all in the peripheral skeleton rather than the axial skeleton. His last imaging was a PET undertaken in April. He has another order for another one to be done this fall. He continues to come in on a monthly basis to receive zoledronic acid. He is taking his monthly lenalidomide 21 days on and 7 days off. I will see him in followup in 2 months and he will get his Evusheld injection in September. He continues with his other immunizations with Dr. Eamon Shelton. Dictated By Patrice Molina M.D.  d: 08/21/2022 06:57:21  t: 08/22/2022 02:53:43  Job [id number]  /    cc: Fidencio Tidwell M.D.     Cherylene Mau

## 2022-08-23 LAB
ALBUMIN SERPL ELPH-MCNC: 3.98 G/DL (ref 3.75–5.21)
ALBUMIN/GLOB SERPL: 1.97 {RATIO} (ref 1–2)
ALPHA1 GLOB SERPL ELPH-MCNC: 0.29 G/DL (ref 0.19–0.46)
ALPHA2 GLOB SERPL ELPH-MCNC: 0.57 G/DL (ref 0.48–1.05)
B-GLOBULIN SERPL ELPH-MCNC: 0.63 G/DL (ref 0.68–1.23)
GAMMA GLOB SERPL ELPH-MCNC: 0.53 G/DL (ref 0.62–1.7)
KAPPA LC FREE SER-MCNC: 2.7 MG/DL (ref 0.33–1.94)
KAPPA LC FREE/LAMBDA FREE SER NEPH: 1.73 {RATIO} (ref 0.26–1.65)
LAMBDA LC FREE SERPL-MCNC: 1.56 MG/DL (ref 0.57–2.63)
PROT SERPL-MCNC: 6 G/DL (ref 6.4–8.2)

## 2022-09-01 ENCOUNTER — APPOINTMENT (OUTPATIENT)
Dept: HEMATOLOGY/ONCOLOGY | Facility: HOSPITAL | Age: 62
End: 2022-09-01
Attending: INTERNAL MEDICINE
Payer: COMMERCIAL

## 2022-09-01 VITALS
OXYGEN SATURATION: 98 % | SYSTOLIC BLOOD PRESSURE: 117 MMHG | HEART RATE: 72 BPM | TEMPERATURE: 98 F | RESPIRATION RATE: 18 BRPM | DIASTOLIC BLOOD PRESSURE: 71 MMHG

## 2022-09-01 DIAGNOSIS — C90.00 MULTIPLE MYELOMA NOT HAVING ACHIEVED REMISSION (HCC): Primary | ICD-10-CM

## 2022-09-02 ENCOUNTER — APPOINTMENT (OUTPATIENT)
Dept: HEMATOLOGY/ONCOLOGY | Facility: HOSPITAL | Age: 62
End: 2022-09-02
Attending: INTERNAL MEDICINE
Payer: COMMERCIAL

## 2022-09-08 RX ORDER — LENALIDOMIDE 15 MG/1
CAPSULE ORAL
Qty: 21 CAPSULE | Refills: 0 | Status: SHIPPED | OUTPATIENT
Start: 2022-09-08 | End: 2022-10-05

## 2022-09-16 ENCOUNTER — OFFICE VISIT (OUTPATIENT)
Dept: HEMATOLOGY/ONCOLOGY | Facility: HOSPITAL | Age: 62
End: 2022-09-16
Attending: INTERNAL MEDICINE
Payer: COMMERCIAL

## 2022-09-16 VITALS
SYSTOLIC BLOOD PRESSURE: 98 MMHG | OXYGEN SATURATION: 97 % | WEIGHT: 229 LBS | TEMPERATURE: 98 F | BODY MASS INDEX: 31.36 KG/M2 | RESPIRATION RATE: 18 BRPM | DIASTOLIC BLOOD PRESSURE: 65 MMHG | HEART RATE: 72 BPM | HEIGHT: 71.69 IN

## 2022-09-16 DIAGNOSIS — C90.00 MULTIPLE MYELOMA NOT HAVING ACHIEVED REMISSION (HCC): Primary | ICD-10-CM

## 2022-09-16 LAB
CALCIUM BLD-MCNC: 8.4 MG/DL (ref 8.5–10.1)
CREAT BLD-MCNC: 1.07 MG/DL
GFR SERPLBLD BASED ON 1.73 SQ M-ARVRAT: 78 ML/MIN/1.73M2 (ref 60–?)

## 2022-09-16 PROCEDURE — 82310 ASSAY OF CALCIUM: CPT

## 2022-09-16 PROCEDURE — 96374 THER/PROPH/DIAG INJ IV PUSH: CPT

## 2022-09-16 PROCEDURE — 82565 ASSAY OF CREATININE: CPT

## 2022-09-16 NOTE — PROGRESS NOTES
Education Record    Learner:  Patient    Disease / Diagnosis: Zometa infusion    Barriers / Limitations:  None   Comments:    Method:  Brief focused and Reinforcement   Comments:    General Topics:  Diet, Medication, Side effects and symptom management and Plan of care reviewed   Comments:    Outcome:  Shows understanding   Comments: Patient tolerated infusion and discharged in stable condition.

## 2022-10-05 RX ORDER — LENALIDOMIDE 15 MG/1
CAPSULE ORAL
Qty: 21 CAPSULE | Refills: 0 | OUTPATIENT
Start: 2022-10-05

## 2022-10-05 RX ORDER — LENALIDOMIDE 15 MG/1
CAPSULE ORAL
Qty: 21 CAPSULE | Refills: 0 | Status: SHIPPED | OUTPATIENT
Start: 2022-10-05

## 2022-10-14 ENCOUNTER — OFFICE VISIT (OUTPATIENT)
Dept: HEMATOLOGY/ONCOLOGY | Facility: HOSPITAL | Age: 62
End: 2022-10-14
Attending: INTERNAL MEDICINE
Payer: COMMERCIAL

## 2022-10-14 VITALS
OXYGEN SATURATION: 96 % | WEIGHT: 226.38 LBS | BODY MASS INDEX: 31 KG/M2 | TEMPERATURE: 98 F | HEIGHT: 71.69 IN | RESPIRATION RATE: 18 BRPM | SYSTOLIC BLOOD PRESSURE: 117 MMHG | HEART RATE: 81 BPM | DIASTOLIC BLOOD PRESSURE: 73 MMHG

## 2022-10-14 DIAGNOSIS — T45.1X5A LEUKOPENIA DUE TO ANTINEOPLASTIC CHEMOTHERAPY (HCC): ICD-10-CM

## 2022-10-14 DIAGNOSIS — C90.00 MULTIPLE MYELOMA NOT HAVING ACHIEVED REMISSION (HCC): Primary | ICD-10-CM

## 2022-10-14 DIAGNOSIS — D70.1 LEUKOPENIA DUE TO ANTINEOPLASTIC CHEMOTHERAPY (HCC): ICD-10-CM

## 2022-10-14 DIAGNOSIS — Z51.11 ENCOUNTER FOR CHEMOTHERAPY MANAGEMENT: ICD-10-CM

## 2022-10-14 LAB
BASOPHILS # BLD AUTO: 0.05 X10(3) UL (ref 0–0.2)
BASOPHILS NFR BLD AUTO: 1.4 %
CALCIUM BLD-MCNC: 8.8 MG/DL (ref 8.5–10.1)
CREAT BLD-MCNC: 0.92 MG/DL
EOSINOPHIL # BLD AUTO: 0.37 X10(3) UL (ref 0–0.7)
EOSINOPHIL NFR BLD AUTO: 10.4 %
ERYTHROCYTE [DISTWIDTH] IN BLOOD BY AUTOMATED COUNT: 14.5 %
GFR SERPLBLD BASED ON 1.73 SQ M-ARVRAT: 94 ML/MIN/1.73M2 (ref 60–?)
HCT VFR BLD AUTO: 39.7 %
HGB BLD-MCNC: 13.5 G/DL
IGA SERPL-MCNC: 104 MG/DL (ref 70–312)
IGM SERPL-MCNC: 23.3 MG/DL (ref 43–279)
IMM GRANULOCYTES # BLD AUTO: 0.01 X10(3) UL (ref 0–1)
IMM GRANULOCYTES NFR BLD: 0.3 %
IMMUNOGLOBULIN PNL SER-MCNC: 561 MG/DL (ref 791–1643)
LDH SERPL L TO P-CCNC: 137 U/L
LYMPHOCYTES # BLD AUTO: 0.88 X10(3) UL (ref 1–4)
LYMPHOCYTES NFR BLD AUTO: 24.8 %
MCH RBC QN AUTO: 29.9 PG (ref 26–34)
MCHC RBC AUTO-ENTMCNC: 34 G/DL (ref 31–37)
MCV RBC AUTO: 88 FL
MONOCYTES # BLD AUTO: 0.76 X10(3) UL (ref 0.1–1)
MONOCYTES NFR BLD AUTO: 21.4 %
NEUTROPHILS # BLD AUTO: 1.48 X10 (3) UL (ref 1.5–7.7)
NEUTROPHILS # BLD AUTO: 1.48 X10(3) UL (ref 1.5–7.7)
NEUTROPHILS NFR BLD AUTO: 41.7 %
PLATELET # BLD AUTO: 147 10(3)UL (ref 150–450)
RBC # BLD AUTO: 4.51 X10(6)UL
WBC # BLD AUTO: 3.6 X10(3) UL (ref 4–11)

## 2022-10-14 PROCEDURE — 99214 OFFICE O/P EST MOD 30 MIN: CPT | Performed by: NURSE PRACTITIONER

## 2022-10-14 PROCEDURE — 84165 PROTEIN E-PHORESIS SERUM: CPT

## 2022-10-14 PROCEDURE — 86334 IMMUNOFIX E-PHORESIS SERUM: CPT

## 2022-10-14 PROCEDURE — 96374 THER/PROPH/DIAG INJ IV PUSH: CPT

## 2022-10-14 PROCEDURE — 85025 COMPLETE CBC W/AUTO DIFF WBC: CPT

## 2022-10-14 PROCEDURE — 82784 ASSAY IGA/IGD/IGG/IGM EACH: CPT

## 2022-10-14 PROCEDURE — 83521 IG LIGHT CHAINS FREE EACH: CPT

## 2022-10-14 RX ORDER — POLYETHYLENE GLYCOL 3350, SODIUM CHLORIDE, SODIUM BICARBONATE, POTASSIUM CHLORIDE 420; 11.2; 5.72; 1.48 G/4L; G/4L; G/4L; G/4L
POWDER, FOR SOLUTION ORAL
COMMUNITY
Start: 2022-08-17

## 2022-10-14 RX ORDER — INDOMETHACIN 50 MG/1
CAPSULE ORAL
COMMUNITY
Start: 2022-09-08

## 2022-10-14 NOTE — PROGRESS NOTES
Education Record    Learner:  Patient    Disease / Diagnosis:myeloma     Barriers / Limitations:  None   Comments:    Method:  Discussion   Comments:    General Topics:  Plan of care reviewed   Comments:    Outcome:  Shows understanding   Comments:    Patient here for Zometa infusion.  Additional labs drawn

## 2022-10-14 NOTE — PROGRESS NOTES
Patient presents with: Follow - Up: APN assessment    Pt is here for treatment - zometa and oral revlimid; today is the last day of his revlimid cycle. Pt has been getting updated vaccines and immunizations; updated in chart. Eating and drinking ok; has had a bit of transient queasiness but no vomiting; denies D,C.      Education Record    Learner:  Patient    Disease / Diagnosis: multiple myeloma    Barriers / Limitations:  None   Comments:    Method:  Brief focused   Comments:    General Topics:  Diet, Medication, Pain, Side effects and symptom management and Plan of care reviewed   Comments:    Outcome:  Shows understanding   Comments:

## 2022-10-19 LAB
ALBUMIN SERPL ELPH-MCNC: 3.96 G/DL (ref 3.75–5.21)
ALBUMIN/GLOB SERPL: 1.85 {RATIO} (ref 1–2)
ALPHA1 GLOB SERPL ELPH-MCNC: 0.31 G/DL (ref 0.19–0.46)
ALPHA2 GLOB SERPL ELPH-MCNC: 0.6 G/DL (ref 0.48–1.05)
B-GLOBULIN SERPL ELPH-MCNC: 0.64 G/DL (ref 0.68–1.23)
GAMMA GLOB SERPL ELPH-MCNC: 0.59 G/DL (ref 0.62–1.7)
KAPPA LC FREE SER-MCNC: 3.27 MG/DL (ref 0.33–1.94)
KAPPA LC FREE/LAMBDA FREE SER NEPH: 1.73 {RATIO} (ref 0.26–1.65)
LAMBDA LC FREE SERPL-MCNC: 1.89 MG/DL (ref 0.57–2.63)
PROT SERPL-MCNC: 6.1 G/DL (ref 6.4–8.2)

## 2022-10-28 ENCOUNTER — HOSPITAL ENCOUNTER (OUTPATIENT)
Dept: NUCLEAR MEDICINE | Facility: HOSPITAL | Age: 62
Discharge: HOME | End: 2022-10-28
Attending: INTERNAL MEDICINE
Payer: COMMERCIAL

## 2022-10-28 DIAGNOSIS — C90.30 PLASMACYTOMA NOT HAVING ACHIEVED REMISSION, UNSPECIFIED PLASMACYTOMA TYPE (HCC): ICD-10-CM

## 2022-10-28 DIAGNOSIS — C90.00 MULTIPLE MYELOMA NOT HAVING ACHIEVED REMISSION (HCC): ICD-10-CM

## 2022-10-28 LAB — GLUCOSE BLD-MCNC: 94 MG/DL (ref 70–99)

## 2022-10-28 PROCEDURE — 82962 GLUCOSE BLOOD TEST: CPT

## 2022-10-28 PROCEDURE — 78816 PET IMAGE W/CT FULL BODY: CPT | Performed by: INTERNAL MEDICINE

## 2022-11-01 RX ORDER — LENALIDOMIDE 15 MG/1
CAPSULE ORAL
Qty: 21 CAPSULE | Refills: 0 | Status: SHIPPED | OUTPATIENT
Start: 2022-11-01 | End: 2022-11-03

## 2022-11-11 ENCOUNTER — OFFICE VISIT (OUTPATIENT)
Dept: HEMATOLOGY/ONCOLOGY | Facility: HOSPITAL | Age: 62
End: 2022-11-11
Attending: INTERNAL MEDICINE
Payer: COMMERCIAL

## 2022-11-11 VITALS
TEMPERATURE: 97 F | SYSTOLIC BLOOD PRESSURE: 124 MMHG | HEIGHT: 71.69 IN | WEIGHT: 225.81 LBS | HEART RATE: 57 BPM | BODY MASS INDEX: 30.92 KG/M2 | DIASTOLIC BLOOD PRESSURE: 76 MMHG | RESPIRATION RATE: 18 BRPM | OXYGEN SATURATION: 100 %

## 2022-11-11 DIAGNOSIS — C90.00 MULTIPLE MYELOMA NOT HAVING ACHIEVED REMISSION (HCC): ICD-10-CM

## 2022-11-11 DIAGNOSIS — C90.00 MULTIPLE MYELOMA NOT HAVING ACHIEVED REMISSION (HCC): Primary | ICD-10-CM

## 2022-11-11 LAB
CALCIUM BLD-MCNC: 8.4 MG/DL (ref 8.5–10.1)
CREAT BLD-MCNC: 1.03 MG/DL
GFR SERPLBLD BASED ON 1.73 SQ M-ARVRAT: 82 ML/MIN/1.73M2 (ref 60–?)

## 2022-11-11 PROCEDURE — 96374 THER/PROPH/DIAG INJ IV PUSH: CPT

## 2022-11-11 PROCEDURE — 99215 OFFICE O/P EST HI 40 MIN: CPT | Performed by: CLINICAL NURSE SPECIALIST

## 2022-11-11 NOTE — PROGRESS NOTES
Patient presents with: Follow - Up: APN assessment  Injection    Pt is here for treatment - maintenance zometa is expected; continues on revlimid. Eating and drinking without issue; denies N,V,D,C. Received updated COVID vaccination along with flu shot for this year.     Education Record    Learner:  Patient    Disease / Diagnosis: multiple myeloma    Barriers / Limitations:  None   Comments:    Method:  Brief focused   Comments:    General Topics:  Diet, Medication, Pain, Side effects and symptom management and Plan of care reviewed   Comments:    Outcome:  Shows understanding   Comments:

## 2022-11-11 NOTE — PROGRESS NOTES
Education Record    Learner:  Patient    Disease / Diagnosis: zometa infusion    Barriers / Limitations:  None   Comments:    Method:  Discussion   Comments:    General Topics:  Plan of care reviewed   Comments:    Outcome:  Shows understanding   Comments:    Zometa ordered quarterly in therapy plan, pt last received 10/14, per Dr. Alfonso Fruits note pt is receiving monthly. Calcium 8.4, ok to proceed with infusion per [facility] APN. 4 wk f/u appt scheduled.

## 2022-12-09 ENCOUNTER — OFFICE VISIT (OUTPATIENT)
Dept: HEMATOLOGY/ONCOLOGY | Facility: HOSPITAL | Age: 62
End: 2022-12-09
Attending: INTERNAL MEDICINE
Payer: COMMERCIAL

## 2022-12-09 VITALS
WEIGHT: 229 LBS | SYSTOLIC BLOOD PRESSURE: 120 MMHG | DIASTOLIC BLOOD PRESSURE: 76 MMHG | HEIGHT: 71.69 IN | HEART RATE: 75 BPM | RESPIRATION RATE: 18 BRPM | OXYGEN SATURATION: 96 % | TEMPERATURE: 98 F | BODY MASS INDEX: 31.36 KG/M2

## 2022-12-09 DIAGNOSIS — C90.00 MULTIPLE MYELOMA NOT HAVING ACHIEVED REMISSION (HCC): Primary | ICD-10-CM

## 2022-12-09 DIAGNOSIS — C90.00 MULTIPLE MYELOMA NOT HAVING ACHIEVED REMISSION (HCC): ICD-10-CM

## 2022-12-09 LAB
CALCIUM BLD-MCNC: 8.6 MG/DL (ref 8.5–10.1)
CREAT BLD-MCNC: 1.02 MG/DL
GFR SERPLBLD BASED ON 1.73 SQ M-ARVRAT: 83 ML/MIN/1.73M2 (ref 60–?)

## 2022-12-09 PROCEDURE — 99214 OFFICE O/P EST MOD 30 MIN: CPT | Performed by: INTERNAL MEDICINE

## 2022-12-09 PROCEDURE — 96374 THER/PROPH/DIAG INJ IV PUSH: CPT

## 2022-12-09 NOTE — PROGRESS NOTES
Education Record    Learner:  Patient    Disease / Diagnosis: here for zometa    Barriers / Limitations:  None   Comments:    Method:  Discussion   Comments:    General Topics:  Plan of care reviewed   Comments:    Outcome:  Shows understanding   Comments:    Labs reviewed. Zometa given per MD order without incident. Tolerated well. Monthly zometa and and f/u with MD in March. Reviewed next appointments. Uses MyChart. Discharged home in stable condition, no new complaints.

## 2022-12-09 NOTE — PROGRESS NOTES
Patient is here for MD f/u for Multiple myeloma. He is due for Zometa infusion today. He has been on Revlimid 15 mg daily. Tolerating well. Last PET scan was on 10/28. Patient had a follow up with Dr Daniela Campa at [place] this week. He received the last of his vaccines needed to be up to date. This included Dtap, Hib and Pneumonia. He is feeling well.        Education Record    Learner:  Patient    Disease / Diagnosis:  Multiple myeloma     Barriers / Limitations:  None   Comments:    Method:  Discussion   Comments:    General Topics:  Plan of care reviewed   Comments:    Outcome:  Shows understanding   Comments:

## 2023-01-06 ENCOUNTER — OFFICE VISIT (OUTPATIENT)
Dept: HEMATOLOGY/ONCOLOGY | Facility: HOSPITAL | Age: 63
End: 2023-01-06
Attending: INTERNAL MEDICINE
Payer: COMMERCIAL

## 2023-01-06 VITALS
OXYGEN SATURATION: 99 % | RESPIRATION RATE: 18 BRPM | WEIGHT: 231 LBS | BODY MASS INDEX: 32 KG/M2 | DIASTOLIC BLOOD PRESSURE: 79 MMHG | TEMPERATURE: 97 F | SYSTOLIC BLOOD PRESSURE: 136 MMHG | HEART RATE: 62 BPM

## 2023-01-06 DIAGNOSIS — C90.00 MULTIPLE MYELOMA NOT HAVING ACHIEVED REMISSION (HCC): Primary | ICD-10-CM

## 2023-01-06 LAB
CALCIUM BLD-MCNC: 8.5 MG/DL (ref 8.5–10.1)
CREAT BLD-MCNC: 1.05 MG/DL
GFR SERPLBLD BASED ON 1.73 SQ M-ARVRAT: 80 ML/MIN/1.73M2 (ref 60–?)

## 2023-01-06 PROCEDURE — 36415 COLL VENOUS BLD VENIPUNCTURE: CPT

## 2023-01-06 PROCEDURE — 96374 THER/PROPH/DIAG INJ IV PUSH: CPT

## 2023-01-06 PROCEDURE — 82565 ASSAY OF CREATININE: CPT

## 2023-01-06 PROCEDURE — 82310 ASSAY OF CALCIUM: CPT

## 2023-02-03 ENCOUNTER — OFFICE VISIT (OUTPATIENT)
Dept: HEMATOLOGY/ONCOLOGY | Facility: HOSPITAL | Age: 63
End: 2023-02-03
Attending: INTERNAL MEDICINE
Payer: COMMERCIAL

## 2023-02-03 VITALS
TEMPERATURE: 97 F | HEART RATE: 59 BPM | HEIGHT: 71.69 IN | WEIGHT: 228 LBS | OXYGEN SATURATION: 96 % | SYSTOLIC BLOOD PRESSURE: 127 MMHG | RESPIRATION RATE: 18 BRPM | DIASTOLIC BLOOD PRESSURE: 69 MMHG | BODY MASS INDEX: 31.22 KG/M2

## 2023-02-03 DIAGNOSIS — C90.00 MULTIPLE MYELOMA NOT HAVING ACHIEVED REMISSION (HCC): Primary | ICD-10-CM

## 2023-02-03 LAB
CALCIUM BLD-MCNC: 8.7 MG/DL (ref 8.5–10.1)
CREAT BLD-MCNC: 1.04 MG/DL
GFR SERPLBLD BASED ON 1.73 SQ M-ARVRAT: 81 ML/MIN/1.73M2 (ref 60–?)

## 2023-02-03 PROCEDURE — 82310 ASSAY OF CALCIUM: CPT

## 2023-02-03 PROCEDURE — 36415 COLL VENOUS BLD VENIPUNCTURE: CPT

## 2023-02-03 PROCEDURE — 96374 THER/PROPH/DIAG INJ IV PUSH: CPT

## 2023-02-03 PROCEDURE — 82565 ASSAY OF CREATININE: CPT

## 2023-02-03 NOTE — PROGRESS NOTES
Education Record    Learner:  Patient    Disease / Diagnosis: Here for Zometa    Barriers / Limitations:  None   Comments:    Method:  Discussion   Comments:    General Topics:  Plan of care reviewed   Comments:    Outcome:  Shows understanding   Comments:  Zometa given per MD order without incident. Tolerated well. Reviewed next appointment. Discharged home in stable condition, no new complaints.

## 2023-02-20 DIAGNOSIS — M79.671 CHRONIC HEEL PAIN, RIGHT: ICD-10-CM

## 2023-02-20 DIAGNOSIS — G89.29 CHRONIC HEEL PAIN, RIGHT: ICD-10-CM

## 2023-02-20 DIAGNOSIS — Z85.79 HISTORY OF MULTIPLE MYELOMA: Primary | ICD-10-CM

## 2023-02-20 RX ORDER — TRAMADOL HYDROCHLORIDE 50 MG/1
TABLET ORAL EVERY 6 HOURS PRN
Qty: 90 TABLET | Refills: 2 | Status: SHIPPED | OUTPATIENT
Start: 2023-02-20

## 2023-02-20 NOTE — PROGRESS NOTES
Pt contacted us with worsening heel pain over the last month. 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  SP induction therapy and autologous stem cell transplant on maintenance therapy. Needs MRI to rule out myelomatous involement.   Order placed   A Cesartel

## 2023-02-24 ENCOUNTER — HOSPITAL ENCOUNTER (OUTPATIENT)
Dept: MRI IMAGING | Facility: HOSPITAL | Age: 63
Discharge: HOME | End: 2023-02-24
Attending: INTERNAL MEDICINE
Payer: COMMERCIAL

## 2023-02-24 ENCOUNTER — TELEPHONE (OUTPATIENT)
Dept: HEMATOLOGY/ONCOLOGY | Facility: HOSPITAL | Age: 63
End: 2023-02-24

## 2023-02-24 DIAGNOSIS — Z85.79 HISTORY OF MULTIPLE MYELOMA: ICD-10-CM

## 2023-02-24 DIAGNOSIS — G89.29 CHRONIC HEEL PAIN, RIGHT: ICD-10-CM

## 2023-02-24 DIAGNOSIS — M79.671 CHRONIC HEEL PAIN, RIGHT: ICD-10-CM

## 2023-02-24 PROCEDURE — 73721 MRI JNT OF LWR EXTRE W/O DYE: CPT | Performed by: INTERNAL MEDICINE

## 2023-02-24 NOTE — TELEPHONE ENCOUNTER
Spoke to [employer] regarding MRI - calcaneal stress fracture. May be the heel equivalent of an atypical femoral fracture due to zometa he has been receiving - - told him to take this back to his podiatrist.  Needs at least a walking boot. May be related to zometa - will hold for now until healed.   NEHA Price

## 2023-03-01 ENCOUNTER — APPOINTMENT (OUTPATIENT)
Dept: HEMATOLOGY/ONCOLOGY | Facility: HOSPITAL | Age: 63
End: 2023-03-01
Attending: INTERNAL MEDICINE
Payer: COMMERCIAL

## 2023-03-02 RX ORDER — LENALIDOMIDE 15 MG/1
CAPSULE ORAL
Qty: 21 CAPSULE | Refills: 0 | Status: SHIPPED | OUTPATIENT
Start: 2023-03-02

## 2023-03-03 ENCOUNTER — OFFICE VISIT (OUTPATIENT)
Dept: HEMATOLOGY/ONCOLOGY | Facility: HOSPITAL | Age: 63
End: 2023-03-03
Attending: INTERNAL MEDICINE
Payer: COMMERCIAL

## 2023-03-03 VITALS
OXYGEN SATURATION: 100 % | DIASTOLIC BLOOD PRESSURE: 81 MMHG | BODY MASS INDEX: 31.47 KG/M2 | RESPIRATION RATE: 18 BRPM | HEART RATE: 64 BPM | SYSTOLIC BLOOD PRESSURE: 143 MMHG | WEIGHT: 229.81 LBS | HEIGHT: 71.69 IN | TEMPERATURE: 97 F

## 2023-03-03 DIAGNOSIS — C90.00 MULTIPLE MYELOMA NOT HAVING ACHIEVED REMISSION (HCC): Primary | ICD-10-CM

## 2023-03-03 LAB
ALBUMIN SERPL-MCNC: 3.5 G/DL (ref 3.4–5)
ALBUMIN/GLOB SERPL: 1.1 {RATIO} (ref 1–2)
ALP LIVER SERPL-CCNC: 64 U/L
ALT SERPL-CCNC: 34 U/L
ANION GAP SERPL CALC-SCNC: 3 MMOL/L (ref 0–18)
AST SERPL-CCNC: 21 U/L (ref 15–37)
BASOPHILS # BLD AUTO: 0.06 X10(3) UL (ref 0–0.2)
BASOPHILS NFR BLD AUTO: 1.8 %
BILIRUB SERPL-MCNC: 0.9 MG/DL (ref 0.1–2)
BUN BLD-MCNC: 14 MG/DL (ref 7–18)
CALCIUM BLD-MCNC: 8.8 MG/DL (ref 8.5–10.1)
CHLORIDE SERPL-SCNC: 109 MMOL/L (ref 98–112)
CO2 SERPL-SCNC: 24 MMOL/L (ref 21–32)
CREAT BLD-MCNC: 0.98 MG/DL
EOSINOPHIL # BLD AUTO: 0.22 X10(3) UL (ref 0–0.7)
EOSINOPHIL NFR BLD AUTO: 6.6 %
ERYTHROCYTE [DISTWIDTH] IN BLOOD BY AUTOMATED COUNT: 14.3 %
GFR SERPLBLD BASED ON 1.73 SQ M-ARVRAT: 87 ML/MIN/1.73M2 (ref 60–?)
GLOBULIN PLAS-MCNC: 3.1 G/DL (ref 2.8–4.4)
GLUCOSE BLD-MCNC: 98 MG/DL (ref 70–99)
HCT VFR BLD AUTO: 41.6 %
HGB BLD-MCNC: 14.3 G/DL
IGA SERPL-MCNC: 115 MG/DL (ref 70–312)
IGM SERPL-MCNC: 11.8 MG/DL (ref 43–279)
IMM GRANULOCYTES # BLD AUTO: 0.01 X10(3) UL (ref 0–1)
IMM GRANULOCYTES NFR BLD: 0.3 %
IMMUNOGLOBULIN PNL SER-MCNC: 611 MG/DL (ref 791–1643)
LDH SERPL L TO P-CCNC: 171 U/L
LYMPHOCYTES # BLD AUTO: 1.21 X10(3) UL (ref 1–4)
LYMPHOCYTES NFR BLD AUTO: 36.3 %
MCH RBC QN AUTO: 30.2 PG (ref 26–34)
MCHC RBC AUTO-ENTMCNC: 34.4 G/DL (ref 31–37)
MCV RBC AUTO: 87.8 FL
MONOCYTES # BLD AUTO: 0.59 X10(3) UL (ref 0.1–1)
MONOCYTES NFR BLD AUTO: 17.7 %
NEUTROPHILS # BLD AUTO: 1.24 X10 (3) UL (ref 1.5–7.7)
NEUTROPHILS # BLD AUTO: 1.24 X10(3) UL (ref 1.5–7.7)
NEUTROPHILS NFR BLD AUTO: 37.3 %
OSMOLALITY SERPL CALC.SUM OF ELEC: 282 MOSM/KG (ref 275–295)
PLATELET # BLD AUTO: 127 10(3)UL (ref 150–450)
POTASSIUM SERPL-SCNC: 4.2 MMOL/L (ref 3.5–5.1)
PROT SERPL-MCNC: 6.6 G/DL (ref 6.4–8.2)
RBC # BLD AUTO: 4.74 X10(6)UL
SODIUM SERPL-SCNC: 136 MMOL/L (ref 136–145)
WBC # BLD AUTO: 3.3 X10(3) UL (ref 4–11)

## 2023-03-03 PROCEDURE — 99214 OFFICE O/P EST MOD 30 MIN: CPT | Performed by: INTERNAL MEDICINE

## 2023-03-03 RX ORDER — TIMOLOL MALEATE 5 MG/ML
1 SOLUTION/ DROPS OPHTHALMIC 2 TIMES DAILY
COMMUNITY
Start: 2023-02-15

## 2023-03-03 NOTE — PROGRESS NOTES
Patient is here for MD f/u for Multiple myeloma. Patient continues on Revlimid 15 mg daily. Patient had a MRI of the right foot on 2/24. Found to have a stress fracture. Patient has been on Tramadol as needed for right foot pain. He is able to bear weight and pain is better today. Appetite is good. Energy level varies but overall ok. Per Dr Kaleb Blevins today.        Education Record    Learner:  Patient    Disease / Diagnosis:  Multiple myeloma     Barriers / Limitations:  None   Comments:    Method:  Discussion   Comments:    General Topics:  Plan of care reviewed   Comments:    Outcome:  Shows understanding   Comments:

## 2023-03-06 PROBLEM — S92.014D CLOSED NONDISPLACED FRACTURE OF BODY OF RIGHT CALCANEUS WITH ROUTINE HEALING: Status: ACTIVE | Noted: 2023-03-06

## 2023-03-07 DIAGNOSIS — C90.01 MULTIPLE MYELOMA IN REMISSION (HCC): Primary | ICD-10-CM

## 2023-03-07 LAB
ALBUMIN SERPL ELPH-MCNC: 4.04 G/DL (ref 3.75–5.21)
ALBUMIN/GLOB SERPL: 1.71 {RATIO} (ref 1–2)
ALPHA1 GLOB SERPL ELPH-MCNC: 0.33 G/DL (ref 0.19–0.46)
ALPHA2 GLOB SERPL ELPH-MCNC: 0.71 G/DL (ref 0.48–1.05)
B-GLOBULIN SERPL ELPH-MCNC: 0.7 G/DL (ref 0.68–1.23)
GAMMA GLOB SERPL ELPH-MCNC: 0.62 G/DL (ref 0.62–1.7)
KAPPA LC FREE SER-MCNC: 3.52 MG/DL (ref 0.33–1.94)
KAPPA LC FREE/LAMBDA FREE SER NEPH: 2.18 {RATIO} (ref 0.26–1.65)
LAMBDA LC FREE SERPL-MCNC: 1.61 MG/DL (ref 0.57–2.63)
PROT SERPL-MCNC: 6.4 G/DL (ref 6.4–8.2)

## 2023-03-14 ENCOUNTER — LAB ENCOUNTER (OUTPATIENT)
Dept: LAB | Facility: HOSPITAL | Age: 63
End: 2023-03-14
Attending: INTERNAL MEDICINE
Payer: COMMERCIAL

## 2023-03-14 DIAGNOSIS — C90.01 MULTIPLE MYELOMA IN REMISSION (HCC): ICD-10-CM

## 2023-03-14 PROCEDURE — 84166 PROTEIN E-PHORESIS/URINE/CSF: CPT

## 2023-03-14 PROCEDURE — 86335 IMMUNFIX E-PHORSIS/URINE/CSF: CPT

## 2023-03-14 PROCEDURE — 84156 ASSAY OF PROTEIN URINE: CPT

## 2023-03-15 LAB
M PROTEIN 24H UR ELPH-MRATE: 299.3 MG/24 HR (ref ?–149.1)
SPECIMEN VOL UR: 1750 ML

## 2023-04-25 ENCOUNTER — HOSPITAL ENCOUNTER (OUTPATIENT)
Dept: NUCLEAR MEDICINE | Facility: HOSPITAL | Age: 63
Discharge: HOME | End: 2023-04-25
Attending: INTERNAL MEDICINE
Payer: COMMERCIAL

## 2023-04-25 DIAGNOSIS — C90.00 MULTIPLE MYELOMA NOT HAVING ACHIEVED REMISSION (HCC): ICD-10-CM

## 2023-04-25 LAB — GLUCOSE BLD-MCNC: 103 MG/DL (ref 70–99)

## 2023-04-25 PROCEDURE — 78816 PET IMAGE W/CT FULL BODY: CPT | Performed by: INTERNAL MEDICINE

## 2023-04-25 PROCEDURE — 82962 GLUCOSE BLOOD TEST: CPT

## 2023-05-05 ENCOUNTER — OFFICE VISIT (OUTPATIENT)
Dept: HEMATOLOGY/ONCOLOGY | Facility: HOSPITAL | Age: 63
End: 2023-05-05
Attending: INTERNAL MEDICINE
Payer: COMMERCIAL

## 2023-05-05 VITALS
DIASTOLIC BLOOD PRESSURE: 65 MMHG | RESPIRATION RATE: 20 BRPM | TEMPERATURE: 97 F | OXYGEN SATURATION: 99 % | WEIGHT: 230 LBS | HEIGHT: 71.69 IN | SYSTOLIC BLOOD PRESSURE: 98 MMHG | HEART RATE: 78 BPM | BODY MASS INDEX: 31.5 KG/M2

## 2023-05-05 DIAGNOSIS — C90.00 MULTIPLE PLASMACYTOMA OF BONE (HCC): ICD-10-CM

## 2023-05-05 LAB
ALBUMIN SERPL-MCNC: 3.2 G/DL (ref 3.4–5)
ALBUMIN/GLOB SERPL: 1.2 {RATIO} (ref 1–2)
ALP LIVER SERPL-CCNC: 52 U/L
ALT SERPL-CCNC: 27 U/L
ANION GAP SERPL CALC-SCNC: 5 MMOL/L (ref 0–18)
AST SERPL-CCNC: 13 U/L (ref 15–37)
BASOPHILS # BLD AUTO: 0.06 X10(3) UL (ref 0–0.2)
BASOPHILS NFR BLD AUTO: 2.1 %
BILIRUB SERPL-MCNC: 0.7 MG/DL (ref 0.1–2)
BUN BLD-MCNC: 16 MG/DL (ref 7–18)
CALCIUM BLD-MCNC: 8.7 MG/DL (ref 8.5–10.1)
CHLORIDE SERPL-SCNC: 112 MMOL/L (ref 98–112)
CO2 SERPL-SCNC: 24 MMOL/L (ref 21–32)
CREAT BLD-MCNC: 1.03 MG/DL
EOSINOPHIL # BLD AUTO: 0.13 X10(3) UL (ref 0–0.7)
EOSINOPHIL NFR BLD AUTO: 4.5 %
ERYTHROCYTE [DISTWIDTH] IN BLOOD BY AUTOMATED COUNT: 14.9 %
GFR SERPLBLD BASED ON 1.73 SQ M-ARVRAT: 82 ML/MIN/1.73M2 (ref 60–?)
GLOBULIN PLAS-MCNC: 2.6 G/DL (ref 2.8–4.4)
GLUCOSE BLD-MCNC: 117 MG/DL (ref 70–99)
HCT VFR BLD AUTO: 40.3 %
HGB BLD-MCNC: 13.2 G/DL
IGA SERPL-MCNC: 101 MG/DL (ref 70–312)
IGM SERPL-MCNC: 9.7 MG/DL (ref 43–279)
IMM GRANULOCYTES # BLD AUTO: 0.01 X10(3) UL (ref 0–1)
IMM GRANULOCYTES NFR BLD: 0.3 %
IMMUNOGLOBULIN PNL SER-MCNC: 550 MG/DL (ref 791–1643)
LYMPHOCYTES # BLD AUTO: 0.91 X10(3) UL (ref 1–4)
LYMPHOCYTES NFR BLD AUTO: 31.8 %
MCH RBC QN AUTO: 29.6 PG (ref 26–34)
MCHC RBC AUTO-ENTMCNC: 32.8 G/DL (ref 31–37)
MCV RBC AUTO: 90.4 FL
MONOCYTES # BLD AUTO: 0.51 X10(3) UL (ref 0.1–1)
MONOCYTES NFR BLD AUTO: 17.8 %
NEUTROPHILS # BLD AUTO: 1.24 X10 (3) UL (ref 1.5–7.7)
NEUTROPHILS # BLD AUTO: 1.24 X10(3) UL (ref 1.5–7.7)
NEUTROPHILS NFR BLD AUTO: 43.5 %
OSMOLALITY SERPL CALC.SUM OF ELEC: 294 MOSM/KG (ref 275–295)
PLATELET # BLD AUTO: 155 10(3)UL (ref 150–450)
POTASSIUM SERPL-SCNC: 3.5 MMOL/L (ref 3.5–5.1)
PROT SERPL-MCNC: 5.8 G/DL (ref 6.4–8.2)
RBC # BLD AUTO: 4.46 X10(6)UL
SODIUM SERPL-SCNC: 141 MMOL/L (ref 136–145)
WBC # BLD AUTO: 2.9 X10(3) UL (ref 4–11)

## 2023-05-05 PROCEDURE — 99215 OFFICE O/P EST HI 40 MIN: CPT | Performed by: INTERNAL MEDICINE

## 2023-05-08 LAB
ALBUMIN SERPL ELPH-MCNC: 3.63 G/DL (ref 3.75–5.21)
ALBUMIN/GLOB SERPL: 1.84 {RATIO} (ref 1–2)
ALPHA1 GLOB SERPL ELPH-MCNC: 0.27 G/DL (ref 0.19–0.46)
ALPHA2 GLOB SERPL ELPH-MCNC: 0.57 G/DL (ref 0.48–1.05)
B-GLOBULIN SERPL ELPH-MCNC: 0.62 G/DL (ref 0.68–1.23)
GAMMA GLOB SERPL ELPH-MCNC: 0.52 G/DL (ref 0.62–1.7)
KAPPA LC FREE SER-MCNC: 2.98 MG/DL (ref 0.33–1.94)
KAPPA LC FREE/LAMBDA FREE SER NEPH: 1.69 {RATIO} (ref 0.26–1.65)
LAMBDA LC FREE SERPL-MCNC: 1.77 MG/DL (ref 0.57–2.63)
PROT SERPL-MCNC: 5.6 G/DL (ref 6.4–8.2)

## 2023-05-24 RX ORDER — LENALIDOMIDE 15 MG/1
CAPSULE ORAL
Qty: 21 CAPSULE | Refills: 0 | Status: SHIPPED | OUTPATIENT
Start: 2023-05-24

## 2023-06-16 ENCOUNTER — OFFICE VISIT (OUTPATIENT)
Dept: HEMATOLOGY/ONCOLOGY | Facility: HOSPITAL | Age: 63
End: 2023-06-16
Attending: INTERNAL MEDICINE
Payer: COMMERCIAL

## 2023-06-16 VITALS
DIASTOLIC BLOOD PRESSURE: 73 MMHG | WEIGHT: 229.38 LBS | RESPIRATION RATE: 16 BRPM | OXYGEN SATURATION: 95 % | TEMPERATURE: 97 F | SYSTOLIC BLOOD PRESSURE: 108 MMHG | BODY MASS INDEX: 31 KG/M2

## 2023-06-16 DIAGNOSIS — C90.00 MULTIPLE MYELOMA NOT HAVING ACHIEVED REMISSION (HCC): Primary | ICD-10-CM

## 2023-06-16 LAB
CALCIUM BLD-MCNC: 8.9 MG/DL (ref 8.5–10.1)
CREAT BLD-MCNC: 0.99 MG/DL
GFR SERPLBLD BASED ON 1.73 SQ M-ARVRAT: 86 ML/MIN/1.73M2 (ref 60–?)

## 2023-06-16 PROCEDURE — 82565 ASSAY OF CREATININE: CPT

## 2023-06-16 PROCEDURE — 82310 ASSAY OF CALCIUM: CPT

## 2023-06-16 PROCEDURE — 96374 THER/PROPH/DIAG INJ IV PUSH: CPT

## 2023-06-16 PROCEDURE — 36415 COLL VENOUS BLD VENIPUNCTURE: CPT

## 2023-06-16 NOTE — PROGRESS NOTES
Education Record    Learner:  Patient    Disease / Diagnosis: here for zometa    Barriers / Limitations:  None    Method:  Brief focused, printed material and  reinforcement    General Topics:  Plan of care reviewed    Outcome:  Patient ambulatory. No complaints. Tolerated infusion. Shows understanding. Discharged in stable condition.

## 2023-07-20 ENCOUNTER — TELEPHONE (OUTPATIENT)
Dept: HEMATOLOGY/ONCOLOGY | Facility: HOSPITAL | Age: 63
End: 2023-07-20

## 2023-07-20 RX ORDER — LENALIDOMIDE 15 MG/1
1 CAPSULE ORAL DAILY
Qty: 21 CAPSULE | Refills: 0 | Status: SHIPPED | OUTPATIENT
Start: 2023-07-20 | End: 2023-07-24

## 2023-07-20 NOTE — TELEPHONE ENCOUNTER
Mickey Kelley from EMS to advise that the prior auth number is restricted and would like the office to remind the pt not to donate any blood or sperm. This question was answered as no in the pre authorization.  Please call back at [phone] to clarify-NL

## 2023-08-10 RX ORDER — LENALIDOMIDE 15 MG/1
1 CAPSULE ORAL DAILY
Qty: 21 CAPSULE | Refills: 0 | Status: SHIPPED | OUTPATIENT
Start: 2023-08-10

## 2023-09-05 RX ORDER — LENALIDOMIDE 15 MG/1
1 CAPSULE ORAL DAILY
Qty: 21 CAPSULE | Refills: 0 | Status: SHIPPED | OUTPATIENT
Start: 2023-09-05 | End: 2023-10-12

## 2023-09-08 ENCOUNTER — OFFICE VISIT (OUTPATIENT)
Dept: HEMATOLOGY/ONCOLOGY | Facility: HOSPITAL | Age: 63
End: 2023-09-08
Attending: INTERNAL MEDICINE
Payer: COMMERCIAL

## 2023-09-08 VITALS
HEART RATE: 66 BPM | HEIGHT: 71.69 IN | SYSTOLIC BLOOD PRESSURE: 129 MMHG | TEMPERATURE: 97 F | WEIGHT: 234 LBS | DIASTOLIC BLOOD PRESSURE: 87 MMHG | RESPIRATION RATE: 16 BRPM | OXYGEN SATURATION: 100 % | BODY MASS INDEX: 32.04 KG/M2

## 2023-09-08 DIAGNOSIS — C90.00 MULTIPLE MYELOMA NOT HAVING ACHIEVED REMISSION (HCC): Primary | ICD-10-CM

## 2023-09-08 DIAGNOSIS — C90.00 MULTIPLE PLASMACYTOMA OF BONE (HCC): ICD-10-CM

## 2023-09-08 DIAGNOSIS — N40.0 BENIGN PROSTATIC HYPERPLASIA WITHOUT LOWER URINARY TRACT SYMPTOMS: Primary | ICD-10-CM

## 2023-09-08 LAB
ALBUMIN SERPL-MCNC: 3.5 G/DL (ref 3.4–5)
ALBUMIN/GLOB SERPL: 1.2 {RATIO} (ref 1–2)
ALP LIVER SERPL-CCNC: 54 U/L
ALT SERPL-CCNC: 33 U/L
ANION GAP SERPL CALC-SCNC: 7 MMOL/L (ref 0–18)
AST SERPL-CCNC: 16 U/L (ref 15–37)
BASOPHILS # BLD AUTO: 0.09 X10(3) UL (ref 0–0.2)
BASOPHILS NFR BLD AUTO: 2.4 %
BILIRUB SERPL-MCNC: 1.1 MG/DL (ref 0.1–2)
BUN BLD-MCNC: 13 MG/DL (ref 7–18)
CALCIUM BLD-MCNC: 8.5 MG/DL (ref 8.5–10.1)
CHLORIDE SERPL-SCNC: 106 MMOL/L (ref 98–112)
CO2 SERPL-SCNC: 25 MMOL/L (ref 21–32)
CREAT BLD-MCNC: 1.09 MG/DL
EGFRCR SERPLBLD CKD-EPI 2021: 76 ML/MIN/1.73M2 (ref 60–?)
EOSINOPHIL # BLD AUTO: 0.15 X10(3) UL (ref 0–0.7)
EOSINOPHIL NFR BLD AUTO: 4.1 %
ERYTHROCYTE [DISTWIDTH] IN BLOOD BY AUTOMATED COUNT: 14.5 %
FASTING STATUS PATIENT QL REPORTED: NO
GLOBULIN PLAS-MCNC: 2.9 G/DL (ref 2.8–4.4)
GLUCOSE BLD-MCNC: 104 MG/DL (ref 70–99)
HCT VFR BLD AUTO: 41.5 %
HGB BLD-MCNC: 14.5 G/DL
IGA SERPL-MCNC: 118 MG/DL (ref 70–312)
IGM SERPL-MCNC: 9.9 MG/DL (ref 43–279)
IMM GRANULOCYTES # BLD AUTO: 0.03 X10(3) UL (ref 0–1)
IMM GRANULOCYTES NFR BLD: 0.8 %
IMMUNOGLOBULIN PNL SER-MCNC: 541 MG/DL (ref 791–1643)
LYMPHOCYTES # BLD AUTO: 1 X10(3) UL (ref 1–4)
LYMPHOCYTES NFR BLD AUTO: 27.1 %
MCH RBC QN AUTO: 30.8 PG (ref 26–34)
MCHC RBC AUTO-ENTMCNC: 34.9 G/DL (ref 31–37)
MCV RBC AUTO: 88.1 FL
MONOCYTES # BLD AUTO: 0.65 X10(3) UL (ref 0.1–1)
MONOCYTES NFR BLD AUTO: 17.6 %
NEUTROPHILS # BLD AUTO: 1.77 X10 (3) UL (ref 1.5–7.7)
NEUTROPHILS # BLD AUTO: 1.77 X10(3) UL (ref 1.5–7.7)
NEUTROPHILS NFR BLD AUTO: 48 %
OSMOLALITY SERPL CALC.SUM OF ELEC: 286 MOSM/KG (ref 275–295)
PLATELET # BLD AUTO: 153 10(3)UL (ref 150–450)
POTASSIUM SERPL-SCNC: 4 MMOL/L (ref 3.5–5.1)
PROT SERPL-MCNC: 6.4 G/DL (ref 6.4–8.2)
PSA SERPL-MCNC: 1.7 NG/ML (ref ?–4)
RBC # BLD AUTO: 4.71 X10(6)UL
SODIUM SERPL-SCNC: 138 MMOL/L (ref 136–145)
WBC # BLD AUTO: 3.7 X10(3) UL (ref 4–11)

## 2023-09-08 PROCEDURE — 99215 OFFICE O/P EST HI 40 MIN: CPT | Performed by: INTERNAL MEDICINE

## 2023-09-08 PROCEDURE — 96374 THER/PROPH/DIAG INJ IV PUSH: CPT

## 2023-09-08 NOTE — PROGRESS NOTES
Here for follow up and zometa infusion. Taking revlemid 15mg daily maintenance. No issues with revlemid. He reports being sick after his last zometa infusion for about 5 days. He had flu like symptoms and bone pain. Currently, he does not have any symptoms.

## 2023-09-08 NOTE — PROGRESS NOTES
Education Record    Learner:  Patient    Disease / Diagnosis: MM    Barriers / Limitations:  None    Method:  Brief focused, printed material and  reinforcement    General Topics:  Plan of care reviewed    Outcome:  Shows understanding    Zometa given after labs reviewed.  Appt made for 12 weeks for zometa and MD.

## 2023-09-13 LAB
ALBUMIN SERPL ELPH-MCNC: 3.85 G/DL (ref 3.75–5.21)
ALBUMIN/GLOB SERPL: 1.79 {RATIO} (ref 1–2)
ALPHA1 GLOB SERPL ELPH-MCNC: 0.28 G/DL (ref 0.19–0.46)
ALPHA2 GLOB SERPL ELPH-MCNC: 0.59 G/DL (ref 0.48–1.05)
B-GLOBULIN SERPL ELPH-MCNC: 0.7 G/DL (ref 0.68–1.23)
GAMMA GLOB SERPL ELPH-MCNC: 0.59 G/DL (ref 0.62–1.7)
KAPPA LC FREE SER-MCNC: 3.12 MG/DL (ref 0.33–1.94)
KAPPA LC FREE/LAMBDA FREE SER NEPH: 1.72 {RATIO} (ref 0.26–1.65)
LAMBDA LC FREE SERPL-MCNC: 1.82 MG/DL (ref 0.57–2.63)
PROT SERPL-MCNC: 6 G/DL (ref 6.4–8.2)

## 2023-10-12 RX ORDER — LENALIDOMIDE 15 MG/1
1 CAPSULE ORAL DAILY
Qty: 21 CAPSULE | Refills: 0 | Status: SHIPPED | OUTPATIENT
Start: 2023-10-12

## 2023-10-13 RX ORDER — LENALIDOMIDE 15 MG/1
1 CAPSULE ORAL DAILY
Qty: 21 CAPSULE | Refills: 0 | OUTPATIENT
Start: 2023-10-13

## 2023-11-08 RX ORDER — LENALIDOMIDE 15 MG/1
1 CAPSULE ORAL DAILY
Qty: 30 CAPSULE | Refills: 0 | Status: SHIPPED | OUTPATIENT
Start: 2023-11-08

## 2023-11-13 RX ORDER — LENALIDOMIDE 15 MG/1
1 CAPSULE ORAL DAILY
Qty: 21 CAPSULE | Refills: 0 | OUTPATIENT
Start: 2023-11-13

## 2023-12-01 ENCOUNTER — OFFICE VISIT (OUTPATIENT)
Dept: HEMATOLOGY/ONCOLOGY | Facility: HOSPITAL | Age: 63
End: 2023-12-01
Attending: INTERNAL MEDICINE
Payer: COMMERCIAL

## 2023-12-01 VITALS
SYSTOLIC BLOOD PRESSURE: 131 MMHG | WEIGHT: 233.81 LBS | HEIGHT: 71.69 IN | DIASTOLIC BLOOD PRESSURE: 80 MMHG | OXYGEN SATURATION: 96 % | TEMPERATURE: 96 F | HEART RATE: 69 BPM | BODY MASS INDEX: 32.02 KG/M2 | RESPIRATION RATE: 16 BRPM

## 2023-12-01 DIAGNOSIS — C90.00 MULTIPLE PLASMACYTOMA OF BONE (HCC): ICD-10-CM

## 2023-12-01 DIAGNOSIS — C90.00 MULTIPLE MYELOMA NOT HAVING ACHIEVED REMISSION (HCC): Primary | ICD-10-CM

## 2023-12-01 LAB
ALBUMIN SERPL-MCNC: 3.5 G/DL (ref 3.4–5)
ALBUMIN/GLOB SERPL: 1.5 {RATIO} (ref 1–2)
ALP LIVER SERPL-CCNC: 60 U/L
ALT SERPL-CCNC: 40 U/L
ANION GAP SERPL CALC-SCNC: 5 MMOL/L (ref 0–18)
AST SERPL-CCNC: 25 U/L (ref 15–37)
BASOPHILS # BLD AUTO: 0.08 X10(3) UL (ref 0–0.2)
BASOPHILS NFR BLD AUTO: 1.8 %
BILIRUB SERPL-MCNC: 0.9 MG/DL (ref 0.1–2)
BUN BLD-MCNC: 14 MG/DL (ref 9–23)
CALCIUM BLD-MCNC: 8.3 MG/DL (ref 8.5–10.1)
CALCIUM BLD-MCNC: 8.5 MG/DL (ref 8.5–10.1)
CHLORIDE SERPL-SCNC: 110 MMOL/L (ref 98–112)
CO2 SERPL-SCNC: 26 MMOL/L (ref 21–32)
CREAT BLD-MCNC: 1.05 MG/DL
CREAT BLD-MCNC: 1.25 MG/DL
EGFRCR SERPLBLD CKD-EPI 2021: 65 ML/MIN/1.73M2 (ref 60–?)
EGFRCR SERPLBLD CKD-EPI 2021: 80 ML/MIN/1.73M2 (ref 60–?)
EOSINOPHIL # BLD AUTO: 0.17 X10(3) UL (ref 0–0.7)
EOSINOPHIL NFR BLD AUTO: 3.8 %
ERYTHROCYTE [DISTWIDTH] IN BLOOD BY AUTOMATED COUNT: 14 %
GLOBULIN PLAS-MCNC: 2.3 G/DL (ref 2.8–4.4)
GLUCOSE BLD-MCNC: 89 MG/DL (ref 70–99)
HCT VFR BLD AUTO: 40 %
HGB BLD-MCNC: 13.8 G/DL
IGA SERPL-MCNC: 129 MG/DL (ref 70–312)
IGM SERPL-MCNC: 14.1 MG/DL (ref 43–279)
IMM GRANULOCYTES # BLD AUTO: 0.02 X10(3) UL (ref 0–1)
IMM GRANULOCYTES NFR BLD: 0.4 %
IMMUNOGLOBULIN PNL SER-MCNC: 605 MG/DL (ref 791–1643)
LYMPHOCYTES # BLD AUTO: 0.8 X10(3) UL (ref 1–4)
LYMPHOCYTES NFR BLD AUTO: 17.7 %
MCH RBC QN AUTO: 30.5 PG (ref 26–34)
MCHC RBC AUTO-ENTMCNC: 34.5 G/DL (ref 31–37)
MCV RBC AUTO: 88.5 FL
MONOCYTES # BLD AUTO: 0.76 X10(3) UL (ref 0.1–1)
MONOCYTES NFR BLD AUTO: 16.9 %
NEUTROPHILS # BLD AUTO: 2.68 X10 (3) UL (ref 1.5–7.7)
NEUTROPHILS # BLD AUTO: 2.68 X10(3) UL (ref 1.5–7.7)
NEUTROPHILS NFR BLD AUTO: 59.4 %
OSMOLALITY SERPL CALC.SUM OF ELEC: 292 MOSM/KG (ref 275–295)
PLATELET # BLD AUTO: 158 10(3)UL (ref 150–450)
POTASSIUM SERPL-SCNC: 3.6 MMOL/L (ref 3.5–5.1)
PROT SERPL-MCNC: 5.8 G/DL (ref 6.4–8.2)
RBC # BLD AUTO: 4.52 X10(6)UL
SODIUM SERPL-SCNC: 141 MMOL/L (ref 136–145)
WBC # BLD AUTO: 4.5 X10(3) UL (ref 4–11)

## 2023-12-01 PROCEDURE — 83521 IG LIGHT CHAINS FREE EACH: CPT

## 2023-12-01 PROCEDURE — 96374 THER/PROPH/DIAG INJ IV PUSH: CPT

## 2023-12-01 PROCEDURE — 85025 COMPLETE CBC W/AUTO DIFF WBC: CPT

## 2023-12-01 PROCEDURE — 86334 IMMUNOFIX E-PHORESIS SERUM: CPT

## 2023-12-01 PROCEDURE — 84165 PROTEIN E-PHORESIS SERUM: CPT

## 2023-12-01 PROCEDURE — 99215 OFFICE O/P EST HI 40 MIN: CPT | Performed by: CLINICAL NURSE SPECIALIST

## 2023-12-01 PROCEDURE — 82784 ASSAY IGA/IGD/IGG/IGM EACH: CPT

## 2023-12-01 PROCEDURE — 80053 COMPREHEN METABOLIC PANEL: CPT

## 2023-12-01 RX ORDER — AZITHROMYCIN 250 MG/1
250 TABLET, FILM COATED ORAL DAILY
COMMUNITY
Start: 2023-11-29

## 2023-12-01 NOTE — PROGRESS NOTES
Education Record    Learner:  Patient    Disease / Diagnosis: Here for Zometa. Barriers / Limitations:  None    Method:  Brief focused, printed material and  reinforcement    General Topics:  Plan of care reviewed    Outcome:  Shows understanding. Zometa infused without incident. Left in stable condition. Calcium level was low today - APN advised 1gm of Tums BID for 1 week. Pt verbalized understanding.

## 2023-12-05 LAB
ALBUMIN SERPL ELPH-MCNC: 3.61 G/DL (ref 3.75–5.21)
ALBUMIN/GLOB SERPL: 1.73 {RATIO} (ref 1–2)
ALPHA1 GLOB SERPL ELPH-MCNC: 0.3 G/DL (ref 0.19–0.46)
ALPHA2 GLOB SERPL ELPH-MCNC: 0.58 G/DL (ref 0.48–1.05)
B-GLOBULIN SERPL ELPH-MCNC: 0.64 G/DL (ref 0.68–1.23)
GAMMA GLOB SERPL ELPH-MCNC: 0.56 G/DL (ref 0.62–1.7)
KAPPA LC FREE SER-MCNC: 2.81 MG/DL (ref 0.33–1.94)
KAPPA LC FREE/LAMBDA FREE SER NEPH: 1.68 {RATIO} (ref 0.26–1.65)
LAMBDA LC FREE SERPL-MCNC: 1.68 MG/DL (ref 0.57–2.63)
PROT SERPL-MCNC: 5.7 G/DL (ref 5.7–8.2)

## 2023-12-11 RX ORDER — LENALIDOMIDE 15 MG/1
CAPSULE ORAL
Qty: 21 CAPSULE | Refills: 0 | Status: SHIPPED | OUTPATIENT
Start: 2023-12-11 | End: 2024-01-08

## 2023-12-12 RX ORDER — LENALIDOMIDE 15 MG/1
CAPSULE ORAL
Qty: 21 CAPSULE | Refills: 0 | OUTPATIENT
Start: 2023-12-12

## 2024-01-08 RX ORDER — LENALIDOMIDE 15 MG/1
CAPSULE ORAL
Qty: 21 CAPSULE | Refills: 0 | Status: SHIPPED | OUTPATIENT
Start: 2024-01-08 | End: 2024-01-31

## 2024-01-31 RX ORDER — LENALIDOMIDE 15 MG/1
CAPSULE ORAL
Qty: 21 CAPSULE | Refills: 0 | Status: SHIPPED | OUTPATIENT
Start: 2024-01-31 | End: 2024-02-23

## 2024-02-23 ENCOUNTER — OFFICE VISIT (OUTPATIENT)
Dept: HEMATOLOGY/ONCOLOGY | Facility: HOSPITAL | Age: 64
End: 2024-02-23
Attending: INTERNAL MEDICINE
Payer: COMMERCIAL

## 2024-02-23 VITALS
DIASTOLIC BLOOD PRESSURE: 82 MMHG | BODY MASS INDEX: 32.59 KG/M2 | TEMPERATURE: 97 F | HEART RATE: 66 BPM | HEIGHT: 71.69 IN | WEIGHT: 238 LBS | RESPIRATION RATE: 16 BRPM | OXYGEN SATURATION: 97 % | SYSTOLIC BLOOD PRESSURE: 118 MMHG

## 2024-02-23 DIAGNOSIS — C90.00 MULTIPLE MYELOMA NOT HAVING ACHIEVED REMISSION (HCC): Primary | ICD-10-CM

## 2024-02-23 DIAGNOSIS — C90.00 MULTIPLE PLASMACYTOMA OF BONE (HCC): Primary | ICD-10-CM

## 2024-02-23 LAB
ALBUMIN SERPL-MCNC: 3.1 G/DL (ref 3.4–5)
ALBUMIN/GLOB SERPL: 1 {RATIO} (ref 1–2)
ALP LIVER SERPL-CCNC: 82 U/L
ALT SERPL-CCNC: 35 U/L
ANION GAP SERPL CALC-SCNC: 5 MMOL/L (ref 0–18)
AST SERPL-CCNC: 23 U/L (ref 15–37)
BASOPHILS # BLD AUTO: 0.08 X10(3) UL (ref 0–0.2)
BASOPHILS NFR BLD AUTO: 2.2 %
BILIRUB SERPL-MCNC: 1 MG/DL (ref 0.1–2)
BUN BLD-MCNC: 13 MG/DL (ref 9–23)
CALCIUM BLD-MCNC: 8.9 MG/DL (ref 8.5–10.1)
CHLORIDE SERPL-SCNC: 108 MMOL/L (ref 98–112)
CO2 SERPL-SCNC: 25 MMOL/L (ref 21–32)
CREAT BLD-MCNC: 1 MG/DL
EGFRCR SERPLBLD CKD-EPI 2021: 85 ML/MIN/1.73M2 (ref 60–?)
EOSINOPHIL # BLD AUTO: 0.19 X10(3) UL (ref 0–0.7)
EOSINOPHIL NFR BLD AUTO: 5.2 %
ERYTHROCYTE [DISTWIDTH] IN BLOOD BY AUTOMATED COUNT: 14.7 %
GLOBULIN PLAS-MCNC: 3.2 G/DL (ref 2.8–4.4)
GLUCOSE BLD-MCNC: 103 MG/DL (ref 70–99)
HCT VFR BLD AUTO: 38.6 %
HGB BLD-MCNC: 13.3 G/DL
IGA SERPL-MCNC: 138 MG/DL (ref 70–312)
IGM SERPL-MCNC: <5.3 MG/DL (ref 43–279)
IMM GRANULOCYTES # BLD AUTO: 0.01 X10(3) UL (ref 0–1)
IMM GRANULOCYTES NFR BLD: 0.3 %
IMMUNOGLOBULIN PNL SER-MCNC: 595 MG/DL (ref 791–1643)
LYMPHOCYTES # BLD AUTO: 0.79 X10(3) UL (ref 1–4)
LYMPHOCYTES NFR BLD AUTO: 21.5 %
MCH RBC QN AUTO: 30.2 PG (ref 26–34)
MCHC RBC AUTO-ENTMCNC: 34.5 G/DL (ref 31–37)
MCV RBC AUTO: 87.5 FL
MONOCYTES # BLD AUTO: 0.81 X10(3) UL (ref 0.1–1)
MONOCYTES NFR BLD AUTO: 22.1 %
NEUTROPHILS # BLD AUTO: 1.79 X10 (3) UL (ref 1.5–7.7)
NEUTROPHILS # BLD AUTO: 1.79 X10(3) UL (ref 1.5–7.7)
NEUTROPHILS NFR BLD AUTO: 48.7 %
OSMOLALITY SERPL CALC.SUM OF ELEC: 286 MOSM/KG (ref 275–295)
PLATELET # BLD AUTO: 186 10(3)UL (ref 150–450)
POTASSIUM SERPL-SCNC: 3.6 MMOL/L (ref 3.5–5.1)
PROT SERPL-MCNC: 6.3 G/DL (ref 6.4–8.2)
RBC # BLD AUTO: 4.41 X10(6)UL
SODIUM SERPL-SCNC: 138 MMOL/L (ref 136–145)
WBC # BLD AUTO: 3.7 X10(3) UL (ref 4–11)

## 2024-02-23 PROCEDURE — 96374 THER/PROPH/DIAG INJ IV PUSH: CPT

## 2024-02-23 PROCEDURE — 99215 OFFICE O/P EST HI 40 MIN: CPT | Performed by: INTERNAL MEDICINE

## 2024-02-23 RX ORDER — LENALIDOMIDE 15 MG/1
CAPSULE ORAL
Qty: 21 CAPSULE | Refills: 0 | Status: SHIPPED | OUTPATIENT
Start: 2024-02-23

## 2024-02-23 NOTE — PROGRESS NOTES
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 SPEP/RAFAEL no monoclonal protein, normal light chains  3/2019: <3% plasma cells with normal flow and cytogenetics  12/23/20: PET/CT with multiple lytic lesions consistent with multiple myeloma involving the left medial femoral condyle, anterior left patella, distal left tibia, left navicular bone and left 1st metatarsal head and left 1st proximal phalangeal head.  12/29/20: Repeat bmbx with 5% plasma cells  1/19/21: Left tibial lesion biopsy positive for plasma cell neoplasm, monosomy 13, monosomy 14, 1p deletion  7/2021: Received RVD->autologous transplant at [place], now on Revlimid maintenance    History of Present Illness:      64 y/o M PMH lambda light chain multiple myeloma (with extramedullary presentation and disease) s/p auto-SCT who presents for follow up.    - on revlimid maintenance, tolerating well, other than occasional fatigue and malaise which he is attributing to winter  - saw Dr eRno back in 12/2023, continues to have no monoclonal protein on RAFALE, lambda FLC normal  - denies any bone pain    Past Medical History:  Past Medical History:   Diagnosis Date    Cancer (HCC) 2018    multiple plasmacytoma of bone    Chronic idiopathic gout involving toe of left foot 5/15/2019    High blood pressure     High cholesterol     History of total right knee replacement - 10/2018 5/15/2019    Hypercholesterolemia     Mitral regurgitation     Mitral valve prolapse     Obesity     Other and unspecified hyperlipidemia     Sleep apnea     AHI 41    Unspecified essential hypertension      Past Surgical History:   Procedure Laterality Date     BONE MARROW ASPIRATE &BIOPSY  03/22/2019    BONE MARROW ASPIRATE &BIOPSY  12/29/2020    CATARACT Bilateral     IOL's    COLONOSCOPY  11/2012    EYE SURGERY Left     Retinal detachment     HERNIA SURGERY      At age 2 hernia repair    OTHER SURGICAL HISTORY  12/27/2018    Nasal polyps removed - found to be extramedullay plasmacytoma    TOTAL KNEE REPLACEMENT Right 10/2018       Home Medications:   Lenalidomide 15 MG Oral Cap TAKE 1 CAPSULE DAILY FOR 3 WEEKS ON 1 WEEK OFF. 21 capsule 0       Allergies:   Allergies   Allergen Reactions    Latanoprost SWELLING     Eye swelling and visual changes     Penicillins OTHER (SEE COMMENTS) and HIVES     1.  How long ago did you have a reaction to penicillin?  Childhood  2.  Do YOU recall the reaction, or did someone tell you about it? I don't know  3.  How long after you took penicillin did the reaction start? I don't know  4.  What happened when you took penicillin? I don't know  5.  Since your reaction, have you taken other beta-lactam antibiotics without reaction?  No       Psychosocial History:  Social History     Socioeconomic History    Marital status:      Spouse name: Not on file    Number of children: Not on file    Years of education: Not on file    Highest education level: Not on file   Occupational History    Not on file   Tobacco Use    Smoking status: Never    Smokeless tobacco: Never   Vaping Use    Vaping Use: Never used   Substance and Sexual Activity    Alcohol use: Yes     Alcohol/week: 2.0 standard drinks of alcohol     Types: 2 Glasses of wine per week     Comment: 0-1 per month    Drug use: No    Sexual activity: Not on file   Other Topics Concern    Not on file   Social History Narrative    Not on file     Social Determinants of Health     Financial Resource Strain: Not on file   Food Insecurity: Not on file   Transportation Needs: Not on file   Physical Activity: Not on file   Stress: Not on file   Social Connections: Not on file   Housing  Stability: Not on file       Family Medical History:  Family History   Problem Relation Age of Onset    Hypertension Mother        Review of Systems:  A 10-point ROS was done with pertinent positives and negative per the HPI    Vital Signs:  Height: 182.1 cm (5' 11.69\") (02/23 1127)  Weight: 108 kg (238 lb) (02/23 1127)  BSA (Calculated - sq m): 2.29 sq meters (02/23 1127)  Pulse: 66 (02/23 1127)  BP: 118/82 (02/23 1127)  Temp: 96.6 °F (35.9 °C) (02/23 1127)  Do Not Use - Resp Rate: --  SpO2: 97 % (02/23 1127)    Wt Readings from Last 6 Encounters:   02/23/24 108 kg (238 lb)   12/01/23 106.1 kg (233 lb 12.8 oz)   09/08/23 106.1 kg (234 lb)   06/16/23 104.1 kg (229 lb 6.4 oz)   05/05/23 104.3 kg (230 lb)   03/03/23 104.2 kg (229 lb 12.8 oz)       ECOG PS: 1    Physical Examination:  General: Patient is alert and oriented, not in acute distress  Psych: Mood and affect are appropriate  Eyes: EOMI, PERRL  ENT: Oropharynx is clear, no adenopathy  CV: no LE edema  Respiratory: Non labored respirations  GI/Abd: Soft, non-tender  Neurological: Grossly intact   Lymphatics: No palpable cervical, supraclavicular, axillary, or inguinal lymphadenopathy  Skin: no rashes or petechiae    Laboratory:  Lab Results   Component Value Date    WBC 4.5 12/01/2023    WBC 3.7 (L) 09/08/2023    WBC 2.9 (L) 05/05/2023    HGB 13.8 12/01/2023    HGB 14.5 09/08/2023    HGB 13.2 05/05/2023    HCT 40.0 12/01/2023    MCV 88.5 12/01/2023    MCH 30.5 12/01/2023    MCHC 34.5 12/01/2023    RDW 14.0 12/01/2023    .0 12/01/2023    .0 09/08/2023    .0 05/05/2023     Lab Results   Component Value Date    GLU 89 12/01/2023    BUN 14 12/01/2023    BUNCREA 13.3 05/07/2021    CREATSERUM 1.05 12/01/2023    CREATSERUM 1.25 12/01/2023    CREATSERUM 1.09 09/08/2023    ANIONGAP 5 12/01/2023    GFRNAA 89 07/22/2022    GFRNAA 89 07/22/2022    GFRAA 103 07/22/2022    GFRAA 103 07/22/2022    CA 8.5 12/01/2023    OSMOCALC 292 12/01/2023    ALKPHO 60  12/01/2023    AST 25 12/01/2023    ALT 40 12/01/2023    BILT 0.9 12/01/2023    TP 5.7 12/01/2023    TP 5.8 (L) 12/01/2023    ALB 3.61 (L) 12/01/2023    ALB 3.5 12/01/2023    GLOBULIN 2.3 (L) 12/01/2023     12/01/2023    K 3.6 12/01/2023     12/01/2023    CO2 26.0 12/01/2023     Lab Results   Component Value Date    INR 0.92 01/19/2021         Impression & Plan:     Lambda light chain multiple myeloma  - had extramedullary disease at presentation and diffuse lytic lesions  - s/p VRD induction + autologous transplant at [place] 2021 now on revlimid maintenance, tolerating well  - no evidence of disease on labs 12/2023, he had 5% plasma cells on bone marrow and previously had mildly elevated circulating lambda FLC  - continue zometa [id number]; we discussed continuing indefinitely vs time limited treatment. Given his prior myelomatous involvement of bone, I would favor indefinitely quarterly zometa treatment as long as he tolerates  - repeat PET/CT before next visit  - continue follow up with Dr Reno    Follow up: quarterly zometa    Ehsan Amador  Hematology/Medical Oncology  [facility]

## 2024-02-23 NOTE — PROGRESS NOTES
Pt here for Zometa . Pt denies any issues or concerns.      Ordering MD: dr deluca   Order Exp: ongoing     Pt tolerated infusion without difficulty or complaint. Reviewed next apt date/time: yes      Education Record  Learner:  Patient  Disease / Diagnosis: Multiple Myeloma  Barriers / Limitations:  None  Method:  Brief focused  General Topics:  Plan of care reviewed  Outcome:  Shows understanding

## 2024-03-01 LAB
ALBUMIN SERPL ELPH-MCNC: 3.34 G/DL (ref 3.75–5.21)
ALBUMIN/GLOB SERPL: 1.35 {RATIO} (ref 1–2)
ALPHA1 GLOB SERPL ELPH-MCNC: 0.38 G/DL (ref 0.19–0.46)
ALPHA2 GLOB SERPL ELPH-MCNC: 0.82 G/DL (ref 0.48–1.05)
B-GLOBULIN SERPL ELPH-MCNC: 0.68 G/DL (ref 0.68–1.23)
GAMMA GLOB SERPL ELPH-MCNC: 0.59 G/DL (ref 0.62–1.7)
KAPPA LC FREE SER-MCNC: 3.5 MG/DL (ref 0.33–1.94)
KAPPA LC FREE/LAMBDA FREE SER NEPH: 1.59 {RATIO} (ref 0.26–1.65)
LAMBDA LC FREE SERPL-MCNC: 2.2 MG/DL (ref 0.57–2.63)
PROT SERPL-MCNC: 5.8 G/DL (ref 5.7–8.2)

## 2024-03-02 DIAGNOSIS — C90.00 MULTIPLE PLASMACYTOMA OF BONE (HCC): ICD-10-CM

## 2024-03-04 RX ORDER — LENALIDOMIDE 15 MG/1
CAPSULE ORAL
Qty: 21 CAPSULE | Refills: 0 | OUTPATIENT
Start: 2024-03-04

## 2024-03-21 DIAGNOSIS — C90.00 MULTIPLE PLASMACYTOMA OF BONE (HCC): ICD-10-CM

## 2024-03-21 RX ORDER — LENALIDOMIDE 15 MG/1
CAPSULE ORAL
Qty: 21 CAPSULE | Refills: 0 | Status: SHIPPED | OUTPATIENT
Start: 2024-03-21 | End: 2024-04-23

## 2024-04-23 DIAGNOSIS — C90.00 MULTIPLE PLASMACYTOMA OF BONE (HCC): ICD-10-CM

## 2024-04-23 RX ORDER — LENALIDOMIDE 15 MG/1
CAPSULE ORAL
Qty: 21 CAPSULE | Refills: 0 | Status: SHIPPED | OUTPATIENT
Start: 2024-04-23 | End: 2024-05-24

## 2024-05-03 ENCOUNTER — HOSPITAL ENCOUNTER (OUTPATIENT)
Dept: NUCLEAR MEDICINE | Facility: HOSPITAL | Age: 64
Discharge: HOME | End: 2024-05-03
Attending: INTERNAL MEDICINE
Payer: COMMERCIAL

## 2024-05-03 DIAGNOSIS — C90.00 MULTIPLE PLASMACYTOMA OF BONE (HCC): ICD-10-CM

## 2024-05-03 LAB — GLUCOSE BLD-MCNC: 104 MG/DL (ref 70–99)

## 2024-05-03 PROCEDURE — 78816 PET IMAGE W/CT FULL BODY: CPT | Performed by: INTERNAL MEDICINE

## 2024-05-03 PROCEDURE — 82962 GLUCOSE BLOOD TEST: CPT

## 2024-05-24 ENCOUNTER — OFFICE VISIT (OUTPATIENT)
Dept: HEMATOLOGY/ONCOLOGY | Facility: HOSPITAL | Age: 64
End: 2024-05-24
Attending: INTERNAL MEDICINE

## 2024-05-24 VITALS
OXYGEN SATURATION: 98 % | DIASTOLIC BLOOD PRESSURE: 78 MMHG | HEIGHT: 71.65 IN | RESPIRATION RATE: 16 BRPM | WEIGHT: 241 LBS | TEMPERATURE: 97 F | SYSTOLIC BLOOD PRESSURE: 136 MMHG | BODY MASS INDEX: 33 KG/M2 | HEART RATE: 66 BPM

## 2024-05-24 DIAGNOSIS — C90.00 MULTIPLE PLASMACYTOMA OF BONE (HCC): ICD-10-CM

## 2024-05-24 DIAGNOSIS — C90.00 MULTIPLE PLASMACYTOMA OF BONE (HCC): Primary | ICD-10-CM

## 2024-05-24 DIAGNOSIS — C90.00 MULTIPLE MYELOMA NOT HAVING ACHIEVED REMISSION (HCC): ICD-10-CM

## 2024-05-24 LAB
ALBUMIN SERPL-MCNC: 3.5 G/DL (ref 3.4–5)
ALBUMIN/GLOB SERPL: 1.5 {RATIO} (ref 1–2)
ALP LIVER SERPL-CCNC: 58 U/L
ALT SERPL-CCNC: 40 U/L
ANION GAP SERPL CALC-SCNC: 6 MMOL/L (ref 0–18)
AST SERPL-CCNC: 27 U/L (ref 15–37)
BASOPHILS # BLD AUTO: 0.05 X10(3) UL (ref 0–0.2)
BASOPHILS NFR BLD AUTO: 1.9 %
BILIRUB SERPL-MCNC: 1.1 MG/DL (ref 0.1–2)
BUN BLD-MCNC: 10 MG/DL (ref 9–23)
CALCIUM BLD-MCNC: 8.5 MG/DL (ref 8.5–10.1)
CHLORIDE SERPL-SCNC: 110 MMOL/L (ref 98–112)
CO2 SERPL-SCNC: 25 MMOL/L (ref 21–32)
CREAT BLD-MCNC: 1.03 MG/DL
EGFRCR SERPLBLD CKD-EPI 2021: 82 ML/MIN/1.73M2 (ref 60–?)
EOSINOPHIL # BLD AUTO: 0.16 X10(3) UL (ref 0–0.7)
EOSINOPHIL NFR BLD AUTO: 6.2 %
ERYTHROCYTE [DISTWIDTH] IN BLOOD BY AUTOMATED COUNT: 16 %
GLOBULIN PLAS-MCNC: 2.4 G/DL (ref 2.8–4.4)
GLUCOSE BLD-MCNC: 98 MG/DL (ref 70–99)
HCT VFR BLD AUTO: 41 %
HGB BLD-MCNC: 13.8 G/DL
IGA SERPL-MCNC: 126 MG/DL (ref 70–312)
IGM SERPL-MCNC: 10.4 MG/DL (ref 43–279)
IMM GRANULOCYTES # BLD AUTO: 0.02 X10(3) UL (ref 0–1)
IMM GRANULOCYTES NFR BLD: 0.8 %
IMMUNOGLOBULIN PNL SER-MCNC: 579 MG/DL (ref 791–1643)
LYMPHOCYTES # BLD AUTO: 0.9 X10(3) UL (ref 1–4)
LYMPHOCYTES NFR BLD AUTO: 34.9 %
MCH RBC QN AUTO: 30.3 PG (ref 26–34)
MCHC RBC AUTO-ENTMCNC: 33.7 G/DL (ref 31–37)
MCV RBC AUTO: 89.9 FL
MONOCYTES # BLD AUTO: 0.54 X10(3) UL (ref 0.1–1)
MONOCYTES NFR BLD AUTO: 20.9 %
NEUTROPHILS # BLD AUTO: 0.91 X10 (3) UL (ref 1.5–7.7)
NEUTROPHILS # BLD AUTO: 0.91 X10(3) UL (ref 1.5–7.7)
NEUTROPHILS NFR BLD AUTO: 35.3 %
OSMOLALITY SERPL CALC.SUM OF ELEC: 291 MOSM/KG (ref 275–295)
PLATELET # BLD AUTO: 151 10(3)UL (ref 150–450)
POTASSIUM SERPL-SCNC: 3.8 MMOL/L (ref 3.5–5.1)
PROT SERPL-MCNC: 5.9 G/DL (ref 6.4–8.2)
RBC # BLD AUTO: 4.56 X10(6)UL
SODIUM SERPL-SCNC: 141 MMOL/L (ref 136–145)
WBC # BLD AUTO: 2.6 X10(3) UL (ref 4–11)

## 2024-05-24 PROCEDURE — 96374 THER/PROPH/DIAG INJ IV PUSH: CPT

## 2024-05-24 PROCEDURE — 99215 OFFICE O/P EST HI 40 MIN: CPT | Performed by: INTERNAL MEDICINE

## 2024-05-24 PROCEDURE — G2211 COMPLEX E/M VISIT ADD ON: HCPCS | Performed by: INTERNAL MEDICINE

## 2024-05-24 PROCEDURE — 85025 COMPLETE CBC W/AUTO DIFF WBC: CPT

## 2024-05-24 RX ORDER — LENALIDOMIDE 15 MG/1
CAPSULE ORAL
Qty: 21 CAPSULE | Refills: 0 | Status: SHIPPED | OUTPATIENT
Start: 2024-05-24

## 2024-05-24 NOTE — PROGRESS NOTES
Education Record    Learner:  Patient    Disease / Diagnosis: Multiple Myeloma     Barriers / Limitations:  None   Comments:    Method:  Discussion   Comments:    General Topics:  Medication, Side effects and symptom management, and Plan of care reviewed   Comments:    Outcome:  Shows understanding   Comments:    Here for follow up and zometa. Taking revlemid without issue. Feeling well overall. Not 100% up to his energy.

## 2024-05-24 NOTE — PROGRESS NOTES
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 SPEP/RAFAEL no monoclonal protein, normal light chains  3/2019: <3% plasma cells with normal flow and cytogenetics  12/23/20: PET/CT with multiple lytic lesions consistent with multiple myeloma involving the left medial femoral condyle, anterior left patella, distal left tibia, left navicular bone and left 1st metatarsal head and left 1st proximal phalangeal head.  12/29/20: Repeat bmbx with 5% plasma cells  1/19/21: Left tibial lesion biopsy positive for plasma cell neoplasm, monosomy 13, monosomy 14, 1p deletion  7/2021: Received RVD->autologous transplant at [place], now on Revlimid maintenance  5/3/24: PET with LIZBETH    History of Present Illness:      62 y/o M PMH lambda light chain multiple myeloma (with extramedullary presentation and disease) s/p auto-SCT who presents for follow up.    - on revlimid maintenance, tolerating well  - 5/3/24 PET with LIZBETH  - feeling generally tired, attributing it to old age    Past Medical History:  Past Medical History:    Cancer (HCC)    multiple plasmacytoma of bone    Chronic idiopathic gout involving toe of left foot    High blood pressure    High cholesterol    History of total right knee replacement - 10/2018    Hypercholesterolemia    Mitral regurgitation    Mitral valve prolapse    Obesity    Other and unspecified hyperlipidemia    Sleep apnea    AHI 41    Unspecified essential hypertension     Past Surgical History:   Procedure Laterality Date    Bone marrow aspirate &biopsy  03/22/2019    Bone marrow aspirate &biopsy  12/29/2020    Cataract Bilateral     IOL's    Colonoscopy  11/2012    Eye  surgery Left     Retinal detachment     Hernia surgery      At age 2 hernia repair    Other surgical history  12/27/2018    Nasal polyps removed - found to be extramedullay plasmacytoma    Total knee replacement Right 10/2018       Home Medications:  No outpatient medications have been marked as taking for the 5/24/24 encounter (Appointment) with Ehsan Amador MD.       Allergies:   Allergies   Allergen Reactions    Latanoprost SWELLING     Eye swelling and visual changes     Penicillins OTHER (SEE COMMENTS) and HIVES     1.  How long ago did you have a reaction to penicillin?  Childhood  2.  Do YOU recall the reaction, or did someone tell you about it? I don't know  3.  How long after you took penicillin did the reaction start? I don't know  4.  What happened when you took penicillin? I don't know  5.  Since your reaction, have you taken other beta-lactam antibiotics without reaction?  No       Psychosocial History:  Social History     Socioeconomic History    Marital status:      Spouse name: Not on file    Number of children: Not on file    Years of education: Not on file    Highest education level: Not on file   Occupational History    Not on file   Tobacco Use    Smoking status: Never    Smokeless tobacco: Never   Vaping Use    Vaping status: Never Used   Substance and Sexual Activity    Alcohol use: Yes     Alcohol/week: 2.0 standard drinks of alcohol     Types: 2 Glasses of wine per week     Comment: 0-1 per month    Drug use: No    Sexual activity: Not on file   Other Topics Concern    Not on file   Social History Narrative    Not on file     Social Determinants of Health     Financial Resource Strain: Low Risk  (3/5/2024)    [facility]    Overall Financial Resource Strain (CARDIA)     Difficulty of Paying Living Expenses: Not very hard   Food Insecurity: No Food Insecurity (3/5/2024)    Received from [facility]  [place], [facility]    Hunger Vital Sign     Worried About Running Out of Food in the Last Year: Never true     Ran Out of Food in the Last Year: Never true   Transportation Needs: No Transportation Needs (3/5/2024)    Received from [facility], [facility]    PRAPARE - Transportation     Lack of Transportation (Medical): No     Lack of Transportation (Non-Medical): No   Physical Activity: Not on file   Stress: Not on file   Social Connections: Not on file   Housing Stability: Low Risk  (3/5/2024)    Received from [facility], [facility]    Housing Stability Vital Sign     Unable to Pay for Housing in the Last Year: No     Number of Places Lived in the Last Year: 2     In the last 12 months, was there a time when you did not have a steady place to sleep or slept in a shelter (including now)?: No       Family Medical History:  Family History   Problem Relation Age of Onset    Hypertension Mother        Review of Systems:  A 10-point ROS was done with pertinent positives and negative per the HPI    Vital Signs:  Height: --  Weight: --  BSA (Calculated - sq m): --  Pulse: --  BP: --  Temp: --  Do Not Use - Resp Rate: --  SpO2: --    Wt Readings from Last 6 Encounters:   02/23/24 108 kg (238 lb)   12/01/23 106.1 kg (233 lb 12.8 oz)   09/08/23 106.1 kg (234 lb)   06/16/23 104.1 kg (229 lb 6.4 oz)   05/05/23 104.3 kg (230 lb)   03/03/23 104.2 kg (229 lb 12.8 oz)       ECOG PS: 0    Physical Examination:  General: Patient is alert and oriented, not in acute distress  Psych: Mood and affect are appropriate  Eyes: EOMI, PERRL  ENT: Oropharynx is clear, no adenopathy  CV: no LE edema  Respiratory: Non labored respirations  GI/Abd: Soft, non-tender  Neurological: Grossly intact   Lymphatics: No palpable cervical, supraclavicular, axillary, or inguinal lymphadenopathy  Skin: no rashes or petechiae    Laboratory:  Lab  Results   Component Value Date    WBC 3.7 (L) 02/23/2024    WBC 4.5 12/01/2023    WBC 3.7 (L) 09/08/2023    HGB 13.3 02/23/2024    HGB 13.8 12/01/2023    HGB 14.5 09/08/2023    HCT 38.6 (L) 02/23/2024    MCV 87.5 02/23/2024    MCH 30.2 02/23/2024    MCHC 34.5 02/23/2024    RDW 14.7 02/23/2024    .0 02/23/2024    .0 12/01/2023    .0 09/08/2023     Lab Results   Component Value Date     (H) 02/23/2024    BUN 13 02/23/2024    BUNCREA 13.3 05/07/2021    CREATSERUM 1.00 02/23/2024    CREATSERUM 1.05 12/01/2023    CREATSERUM 1.25 12/01/2023    ANIONGAP 5 02/23/2024    GFRNAA 89 07/22/2022    GFRNAA 89 07/22/2022    GFRAA 103 07/22/2022    GFRAA 103 07/22/2022    CA 8.9 02/23/2024    OSMOCALC 286 02/23/2024    ALKPHO 82 02/23/2024    AST 23 02/23/2024    ALT 35 02/23/2024    BILT 1.0 02/23/2024    TP 6.3 (L) 02/23/2024    TP 5.8 02/23/2024    ALB 3.1 (L) 02/23/2024    ALB 3.34 (L) 02/23/2024    GLOBULIN 3.2 02/23/2024     02/23/2024    K 3.6 02/23/2024     02/23/2024    CO2 25.0 02/23/2024     Lab Results   Component Value Date    INR 0.92 01/19/2021           Impression & Plan:     Lambda light chain multiple myeloma  - had extramedullary disease at presentation and diffuse lytic lesions  - s/p VRD induction + autologous transplant at [place] 2021 now on revlimid maintenance, tolerating well  - no evidence of disease on labs 12/2023, he had 5% plasma cells on bone marrow and previously had mildly elevated circulating lambda FLC  - continue zometa [id number]; we discussed continuing indefinitely vs time limited treatment. Given his prior myelomatous involvement of bone, I would favor indefinitely quarterly zometa treatment as long as he tolerates  - PET/CT with LIZBETH; repeat PET/CT in 1 year ~5/2025  - continue follow up with Dr Reno    Follow up: labs & zometa in 3 months, f/u with me in 6 months    Ehsan Amador  Hematology/Medical Oncology  [facility]

## 2024-05-24 NOTE — PROGRESS NOTES
Education Record    Learner:  Patient    Disease / Diagnosis: plasmacytoma     Barriers / Limitations:  None   Comments:    Method:  Discussion   Comments:    General Topics:  Medication, Side effects and symptom management, Plan of care reviewed, and Fall risk and prevention   Comments:    Outcome:  Shows understanding   Comments:    Zometa infusion tolerated well. Next appts scheduled. Pt discharged in stable condition.

## 2024-05-25 DIAGNOSIS — C90.00 MULTIPLE PLASMACYTOMA OF BONE (HCC): ICD-10-CM

## 2024-05-28 RX ORDER — LENALIDOMIDE 15 MG/1
CAPSULE ORAL
Qty: 21 CAPSULE | Refills: 0 | OUTPATIENT
Start: 2024-05-28

## 2024-06-03 LAB
ALBUMIN SERPL ELPH-MCNC: 3.72 G/DL (ref 3.75–5.21)
ALBUMIN/GLOB SERPL: 1.79 {RATIO} (ref 1–2)
ALPHA1 GLOB SERPL ELPH-MCNC: 0.27 G/DL (ref 0.19–0.46)
ALPHA2 GLOB SERPL ELPH-MCNC: 0.59 G/DL (ref 0.48–1.05)
B-GLOBULIN SERPL ELPH-MCNC: 0.67 G/DL (ref 0.68–1.23)
GAMMA GLOB SERPL ELPH-MCNC: 0.56 G/DL (ref 0.62–1.7)
KAPPA LC FREE SER-MCNC: 2.86 MG/DL (ref 0.33–1.94)
KAPPA LC FREE/LAMBDA FREE SER NEPH: 1.09 {RATIO} (ref 0.26–1.65)
LAMBDA LC FREE SERPL-MCNC: 2.62 MG/DL (ref 0.57–2.63)
PROT SERPL-MCNC: 5.8 G/DL (ref 6.4–8.2)

## 2024-06-21 DIAGNOSIS — C90.00 MULTIPLE PLASMACYTOMA OF BONE (HCC): ICD-10-CM

## 2024-06-21 RX ORDER — LENALIDOMIDE 15 MG/1
CAPSULE ORAL
Qty: 21 CAPSULE | Refills: 0 | Status: SHIPPED | OUTPATIENT
Start: 2024-06-21 | End: 2024-07-20

## 2024-07-20 DIAGNOSIS — C90.00 MULTIPLE PLASMACYTOMA OF BONE (HCC): ICD-10-CM

## 2024-07-22 RX ORDER — LENALIDOMIDE 15 MG/1
CAPSULE ORAL
Qty: 21 CAPSULE | Refills: 0 | Status: SHIPPED | OUTPATIENT
Start: 2024-07-22 | End: 2024-07-23

## 2024-07-22 RX ORDER — LENALIDOMIDE 15 MG/1
CAPSULE ORAL
Qty: 21 CAPSULE | Refills: 0 | OUTPATIENT
Start: 2024-07-22

## 2024-07-23 DIAGNOSIS — C90.00 MULTIPLE PLASMACYTOMA OF BONE (HCC): ICD-10-CM

## 2024-07-23 RX ORDER — LENALIDOMIDE 15 MG/1
CAPSULE ORAL
Qty: 21 CAPSULE | Refills: 0 | Status: SHIPPED | OUTPATIENT
Start: 2024-07-23

## 2024-08-17 DIAGNOSIS — C90.00 MULTIPLE PLASMACYTOMA OF BONE (HCC): ICD-10-CM

## 2024-08-19 RX ORDER — LENALIDOMIDE 15 MG/1
CAPSULE ORAL
Qty: 21 CAPSULE | Refills: 0 | Status: SHIPPED | OUTPATIENT
Start: 2024-08-19 | End: 2024-09-13

## 2024-08-20 NOTE — PROGRESS NOTES
Education Record    Learner:  Patient    Disease / Diagnosis: Pt here for Zometa infusion. Barriers / Limitations:  None    Method:  Brief focused, printed material and  reinforcement    General Topics:  Plan of care reviewed    Outcome:  Shows understanding    Pt tolerated infusion. Pt left in stable condition. done

## 2024-08-30 ENCOUNTER — OFFICE VISIT (OUTPATIENT)
Dept: HEMATOLOGY/ONCOLOGY | Facility: HOSPITAL | Age: 64
End: 2024-08-30
Attending: INTERNAL MEDICINE
Payer: COMMERCIAL

## 2024-08-30 DIAGNOSIS — C90.00 MULTIPLE PLASMACYTOMA OF BONE (HCC): ICD-10-CM

## 2024-08-30 DIAGNOSIS — C90.00 MULTIPLE MYELOMA NOT HAVING ACHIEVED REMISSION (HCC): Primary | ICD-10-CM

## 2024-08-30 LAB
ALBUMIN SERPL-MCNC: 4.3 G/DL (ref 3.2–4.8)
ALBUMIN/GLOB SERPL: 2.4 {RATIO} (ref 1–2)
ALP LIVER SERPL-CCNC: 45 U/L
ANION GAP SERPL CALC-SCNC: 3 MMOL/L (ref 0–18)
BASOPHILS # BLD AUTO: 0.08 X10(3) UL (ref 0–0.2)
BASOPHILS NFR BLD AUTO: 3 %
BILIRUB SERPL-MCNC: 0.9 MG/DL (ref 0.2–1.1)
CALCIUM BLD-MCNC: 9.1 MG/DL (ref 8.7–10.4)
CHLORIDE SERPL-SCNC: 109 MMOL/L (ref 98–112)
CO2 SERPL-SCNC: 29 MMOL/L (ref 21–32)
CREAT BLD-MCNC: 1.06 MG/DL
EGFRCR SERPLBLD CKD-EPI 2021: 79 ML/MIN/1.73M2 (ref 60–?)
EOSINOPHIL # BLD AUTO: 0.26 X10(3) UL (ref 0–0.7)
EOSINOPHIL NFR BLD AUTO: 9.6 %
ERYTHROCYTE [DISTWIDTH] IN BLOOD BY AUTOMATED COUNT: 16.8 %
GLOBULIN PLAS-MCNC: 1.8 G/DL (ref 2–3.5)
GLUCOSE BLD-MCNC: 123 MG/DL (ref 70–99)
HCT VFR BLD AUTO: 39 %
HGB BLD-MCNC: 13.5 G/DL
IGA SERPL-MCNC: 129.9 MG/DL (ref 40–350)
IGM SERPL-MCNC: <21 MG/DL (ref 50–300)
IMM GRANULOCYTES # BLD AUTO: 0.05 X10(3) UL (ref 0–1)
IMM GRANULOCYTES NFR BLD: 1.8 %
IMMUNOGLOBULIN PNL SER-MCNC: 524 MG/DL (ref 650–1600)
LYMPHOCYTES # BLD AUTO: 0.88 X10(3) UL (ref 1–4)
LYMPHOCYTES NFR BLD AUTO: 32.5 %
MCH RBC QN AUTO: 30.8 PG (ref 26–34)
MCHC RBC AUTO-ENTMCNC: 34.6 G/DL (ref 31–37)
MCV RBC AUTO: 89 FL
MONOCYTES # BLD AUTO: 0.54 X10(3) UL (ref 0.1–1)
MONOCYTES NFR BLD AUTO: 19.9 %
NEUTROPHILS # BLD AUTO: 0.9 X10 (3) UL (ref 1.5–7.7)
NEUTROPHILS # BLD AUTO: 0.9 X10(3) UL (ref 1.5–7.7)
NEUTROPHILS NFR BLD AUTO: 33.2 %
PLATELET # BLD AUTO: 159 10(3)UL (ref 150–450)
PROT SERPL-MCNC: 6.1 G/DL (ref 5.7–8.2)
RBC # BLD AUTO: 4.38 X10(6)UL
SODIUM SERPL-SCNC: 141 MMOL/L (ref 136–145)
WBC # BLD AUTO: 2.7 X10(3) UL (ref 4–11)

## 2024-08-30 PROCEDURE — 86334 IMMUNOFIX E-PHORESIS SERUM: CPT

## 2024-08-30 PROCEDURE — 84165 PROTEIN E-PHORESIS SERUM: CPT

## 2024-08-30 PROCEDURE — 83521 IG LIGHT CHAINS FREE EACH: CPT

## 2024-08-30 PROCEDURE — 85025 COMPLETE CBC W/AUTO DIFF WBC: CPT

## 2024-08-30 PROCEDURE — 82784 ASSAY IGA/IGD/IGG/IGM EACH: CPT

## 2024-08-30 PROCEDURE — 80053 COMPREHEN METABOLIC PANEL: CPT

## 2024-08-30 PROCEDURE — 96374 THER/PROPH/DIAG INJ IV PUSH: CPT

## 2024-08-30 NOTE — PROGRESS NOTES
Pt arrived amb for Zometa inf.  Labs drawn per MD order.  Met criteria for Zometa inf, give per MD order.  Pt left amb with future appointments scheduled.    Education Record    Learner:  Patient    Pt here for: Zometa inf for multiple myeloma    Barriers / Limitations:  None    Method:  discussion    General Topics:  Plan of care reviewed    Outcome: Pt tolerated infusion with no c/o.

## 2024-09-03 LAB
ALBUMIN SERPL ELPH-MCNC: 3.6 G/DL (ref 3.75–5.21)
ALBUMIN/GLOB SERPL: 1.72 {RATIO} (ref 1–2)
ALPHA1 GLOB SERPL ELPH-MCNC: 0.29 G/DL (ref 0.19–0.46)
ALPHA2 GLOB SERPL ELPH-MCNC: 0.63 G/DL (ref 0.48–1.05)
B-GLOBULIN SERPL ELPH-MCNC: 0.64 G/DL (ref 0.68–1.23)
GAMMA GLOB SERPL ELPH-MCNC: 0.54 G/DL (ref 0.62–1.7)
KAPPA LC FREE SER-MCNC: 2.92 MG/DL (ref 0.33–1.94)
KAPPA LC FREE/LAMBDA FREE SER NEPH: 0.98 {RATIO} (ref 0.26–1.65)
LAMBDA LC FREE SERPL-MCNC: 2.98 MG/DL (ref 0.57–2.63)
PROT SERPL-MCNC: 5.7 G/DL (ref 5.7–8.2)

## 2024-09-13 DIAGNOSIS — C90.00 MULTIPLE PLASMACYTOMA OF BONE (HCC): ICD-10-CM

## 2024-09-13 RX ORDER — LENALIDOMIDE 15 MG/1
CAPSULE ORAL
Qty: 21 CAPSULE | Refills: 0 | Status: SHIPPED | OUTPATIENT
Start: 2024-09-13

## 2024-09-14 DIAGNOSIS — C90.00 MULTIPLE PLASMACYTOMA OF BONE (HCC): ICD-10-CM

## 2024-09-16 RX ORDER — LENALIDOMIDE 15 MG/1
CAPSULE ORAL
Qty: 21 CAPSULE | Refills: 0 | OUTPATIENT
Start: 2024-09-16

## 2024-10-11 DIAGNOSIS — C90.00 MULTIPLE PLASMACYTOMA OF BONE (HCC): ICD-10-CM

## 2024-10-11 RX ORDER — LENALIDOMIDE 15 MG/1
CAPSULE ORAL
Qty: 21 CAPSULE | Refills: 0 | Status: SHIPPED | OUTPATIENT
Start: 2024-10-11

## 2024-10-12 DIAGNOSIS — C90.00 MULTIPLE PLASMACYTOMA OF BONE (HCC): ICD-10-CM

## 2024-10-14 RX ORDER — LENALIDOMIDE 15 MG/1
CAPSULE ORAL
Qty: 21 CAPSULE | Refills: 0 | OUTPATIENT
Start: 2024-10-14

## 2024-11-09 DIAGNOSIS — C90.00 MULTIPLE PLASMACYTOMA OF BONE (HCC): ICD-10-CM

## 2024-11-11 RX ORDER — LENALIDOMIDE 15 MG/1
CAPSULE ORAL
Qty: 21 CAPSULE | Refills: 0 | Status: SHIPPED | OUTPATIENT
Start: 2024-11-11 | End: 2024-12-04

## 2024-11-29 ENCOUNTER — APPOINTMENT (OUTPATIENT)
Dept: HEMATOLOGY/ONCOLOGY | Facility: HOSPITAL | Age: 64
End: 2024-11-29
Attending: INTERNAL MEDICINE
Payer: COMMERCIAL

## 2024-12-04 DIAGNOSIS — C90.00 MULTIPLE PLASMACYTOMA OF BONE (HCC): ICD-10-CM

## 2024-12-04 RX ORDER — LENALIDOMIDE 15 MG/1
CAPSULE ORAL
Qty: 21 CAPSULE | Refills: 0 | Status: SHIPPED | OUTPATIENT
Start: 2024-12-04

## 2024-12-06 ENCOUNTER — OFFICE VISIT (OUTPATIENT)
Dept: HEMATOLOGY/ONCOLOGY | Facility: HOSPITAL | Age: 64
End: 2024-12-06
Attending: INTERNAL MEDICINE
Payer: COMMERCIAL

## 2024-12-06 VITALS
OXYGEN SATURATION: 97 % | TEMPERATURE: 98 F | HEIGHT: 71.65 IN | WEIGHT: 252.81 LBS | SYSTOLIC BLOOD PRESSURE: 143 MMHG | HEART RATE: 64 BPM | DIASTOLIC BLOOD PRESSURE: 82 MMHG | BODY MASS INDEX: 34.62 KG/M2 | RESPIRATION RATE: 20 BRPM

## 2024-12-06 DIAGNOSIS — C90.00 MULTIPLE MYELOMA NOT HAVING ACHIEVED REMISSION (HCC): ICD-10-CM

## 2024-12-06 DIAGNOSIS — C90.00 MULTIPLE MYELOMA NOT HAVING ACHIEVED REMISSION (HCC): Primary | ICD-10-CM

## 2024-12-06 DIAGNOSIS — C90.00 MULTIPLE PLASMACYTOMA OF BONE (HCC): Primary | ICD-10-CM

## 2024-12-06 LAB
ALBUMIN SERPL-MCNC: 3.8 G/DL (ref 3.2–4.8)
ALBUMIN/GLOB SERPL: 1.9 {RATIO} (ref 1–2)
ALP LIVER SERPL-CCNC: 60 U/L
ALT SERPL-CCNC: 35 U/L
ANION GAP SERPL CALC-SCNC: 10 MMOL/L (ref 0–18)
AST SERPL-CCNC: 24 U/L (ref ?–34)
BASOPHILS # BLD AUTO: 0.06 X10(3) UL (ref 0–0.2)
BASOPHILS NFR BLD AUTO: 2.2 %
BILIRUB SERPL-MCNC: 1.1 MG/DL (ref 0.2–1.1)
BUN BLD-MCNC: 10 MG/DL (ref 9–23)
CALCIUM BLD-MCNC: 8.8 MG/DL (ref 8.7–10.4)
CALCIUM BLD-MCNC: 9.4 MG/DL (ref 8.7–10.4)
CHLORIDE SERPL-SCNC: 106 MMOL/L (ref 98–112)
CO2 SERPL-SCNC: 23 MMOL/L (ref 21–32)
CREAT BLD-MCNC: 1.02 MG/DL
CREAT BLD-MCNC: 1.07 MG/DL
EGFRCR SERPLBLD CKD-EPI 2021: 77 ML/MIN/1.73M2 (ref 60–?)
EGFRCR SERPLBLD CKD-EPI 2021: 82 ML/MIN/1.73M2 (ref 60–?)
EOSINOPHIL # BLD AUTO: 0.21 X10(3) UL (ref 0–0.7)
EOSINOPHIL NFR BLD AUTO: 7.5 %
ERYTHROCYTE [DISTWIDTH] IN BLOOD BY AUTOMATED COUNT: 14.6 %
GLOBULIN PLAS-MCNC: 2 G/DL (ref 2–3.5)
GLUCOSE BLD-MCNC: 105 MG/DL (ref 70–99)
HCT VFR BLD AUTO: 40.7 %
HGB BLD-MCNC: 13.9 G/DL
IGA SERPL-MCNC: 129.8 MG/DL (ref 40–350)
IGM SERPL-MCNC: <21 MG/DL (ref 50–300)
IMM GRANULOCYTES # BLD AUTO: 0.02 X10(3) UL (ref 0–1)
IMM GRANULOCYTES NFR BLD: 0.7 %
IMMUNOGLOBULIN PNL SER-MCNC: 530 MG/DL (ref 650–1600)
LYMPHOCYTES # BLD AUTO: 0.82 X10(3) UL (ref 1–4)
LYMPHOCYTES NFR BLD AUTO: 29.4 %
MCH RBC QN AUTO: 31.3 PG (ref 26–34)
MCHC RBC AUTO-ENTMCNC: 34.2 G/DL (ref 31–37)
MCV RBC AUTO: 91.7 FL
MONOCYTES # BLD AUTO: 0.72 X10(3) UL (ref 0.1–1)
MONOCYTES NFR BLD AUTO: 25.8 %
NEUTROPHILS # BLD AUTO: 0.96 X10 (3) UL (ref 1.5–7.7)
NEUTROPHILS # BLD AUTO: 0.96 X10(3) UL (ref 1.5–7.7)
NEUTROPHILS NFR BLD AUTO: 34.4 %
OSMOLALITY SERPL CALC.SUM OF ELEC: 287 MOSM/KG (ref 275–295)
PLATELET # BLD AUTO: 128 10(3)UL (ref 150–450)
POTASSIUM SERPL-SCNC: 4.1 MMOL/L (ref 3.5–5.1)
PROT SERPL-MCNC: 5.8 G/DL (ref 5.7–8.2)
RBC # BLD AUTO: 4.44 X10(6)UL
SODIUM SERPL-SCNC: 139 MMOL/L (ref 136–145)
WBC # BLD AUTO: 2.8 X10(3) UL (ref 4–11)

## 2024-12-06 PROCEDURE — 96374 THER/PROPH/DIAG INJ IV PUSH: CPT

## 2024-12-06 PROCEDURE — 99215 OFFICE O/P EST HI 40 MIN: CPT | Performed by: INTERNAL MEDICINE

## 2024-12-06 PROCEDURE — G2211 COMPLEX E/M VISIT ADD ON: HCPCS | Performed by: INTERNAL MEDICINE

## 2024-12-06 NOTE — PROGRESS NOTES
Education Record    Learner:  Patient    Disease / Diagnosis: Multiple Myeloma     Barriers / Limitations:  None   Comments:    Method:  Discussion   Comments:    General Topics:  Medication and Side effects and symptom management   Comments:    Outcome:  Shows understanding   Comments:    Here for follow up and zometa infusion. Taking revlimid without issue. He is feeling good. Taking an [place] cruise next summer.

## 2024-12-06 NOTE — PROGRESS NOTES
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 SPEP/RAFAEL no monoclonal protein, normal light chains  3/2019: <3% plasma cells with normal flow and cytogenetics  12/23/20: PET/CT with multiple lytic lesions consistent with multiple myeloma involving the left medial femoral condyle, anterior left patella, distal left tibia, left navicular bone and left 1st metatarsal head and left 1st proximal phalangeal head.  12/29/20: Repeat bmbx with 5% plasma cells  1/19/21: Left tibial lesion biopsy positive for plasma cell neoplasm, monosomy 13, monosomy 14, 1p deletion  7/2021: Received RVD->autologous transplant at [place], now on Revlimid maintenance  5/3/24: PET with LIZBETH    History of Present Illness:      63 y/o M PMH lambda light chain multiple myeloma (with extramedullary presentation and disease) s/p auto-SCT who presents for follow up.    - on revlimid maintenance, tolerating well  - he has gained more than 10 lbs since he last saw me; he notes he still exercises, eats about the same or maybe a little less  - otherwise feeling well, no bone pain    Past Medical History:  Past Medical History:    Cancer (HCC)    multiple plasmacytoma of bone    Chronic idiopathic gout involving toe of left foot    High blood pressure    High cholesterol    History of total right knee replacement - 10/2018    Hypercholesterolemia    Mitral regurgitation    Mitral valve prolapse    Obesity    Other and unspecified hyperlipidemia    Sleep apnea    AHI 41    Unspecified essential hypertension     Past Surgical History:   Procedure Laterality Date    Bone marrow aspirate &biopsy  03/22/2019    Bone marrow  aspirate &biopsy  12/29/2020    Cataract Bilateral     IOL's    Colonoscopy  11/2012    Eye surgery Left     Retinal detachment     Hernia surgery      At age 2 hernia repair    Other surgical history  12/27/2018    Nasal polyps removed - found to be extramedullay plasmacytoma    Total knee replacement Right 10/2018       Home Medications:  No outpatient medications have been marked as taking for the 12/6/24 encounter (Appointment) with Ehsan Amador MD.       Allergies:   Allergies   Allergen Reactions    Latanoprost SWELLING     Eye swelling and visual changes     Penicillins OTHER (SEE COMMENTS) and HIVES     1.  How long ago did you have a reaction to penicillin?  Childhood  2.  Do YOU recall the reaction, or did someone tell you about it? I don't know  3.  How long after you took penicillin did the reaction start? I don't know  4.  What happened when you took penicillin? I don't know  5.  Since your reaction, have you taken other beta-lactam antibiotics without reaction?  No       Psychosocial History:  Social History     Socioeconomic History    Marital status:      Spouse name: Not on file    Number of children: Not on file    Years of education: Not on file    Highest education level: Not on file   Occupational History    Not on file   Tobacco Use    Smoking status: Never    Smokeless tobacco: Never   Vaping Use    Vaping status: Never Used   Substance and Sexual Activity    Alcohol use: Yes     Alcohol/week: 2.0 standard drinks of alcohol     Types: 2 Glasses of wine per week     Comment: 0-1 per month    Drug use: No    Sexual activity: Not on file   Other Topics Concern    Not on file   Social History Narrative    Not on file     Social Drivers of Health     Financial Resource Strain: Low Risk  (3/5/2024)    [facility]    Overall Financial Resource Strain (CARDIA)     Difficulty of Paying Living Expenses: Not very hard   Food  Insecurity: No Food Insecurity (3/5/2024)    [facility]    Hunger Vital Sign     Worried About Running Out of Food in the Last Year: Never true     Ran Out of Food in the Last Year: Never true   Transportation Needs: No Transportation Needs (3/5/2024)    [facility]    PRAPARE - Transportation     Lack of Transportation (Medical): No     Lack of Transportation (Non-Medical): No   Physical Activity: Not on file   Stress: Not on file   Social Connections: Not on file   Housing Stability: Low Risk  (3/5/2024)    [facility]    Housing Stability Vital Sign     Unable to Pay for Housing in the Last Year: No     Number of Places Lived in the Last Year: 2     In the last 12 months, was there a time when you did not have a steady place to sleep or slept in a shelter (including now)?: No       Family Medical History:  Family History   Problem Relation Age of Onset    Hypertension Mother        Review of Systems:  A 10-point ROS was done with pertinent positives and negative per the HPI    Vital Signs:  Height: --  Weight: --  BSA (Calculated - sq m): --  Pulse: --  BP: --  Temp: --  Do Not Use - Resp Rate: --  SpO2: --    Wt Readings from Last 6 Encounters:   05/24/24 109.3 kg (241 lb)   02/23/24 108 kg (238 lb)   12/01/23 106.1 kg (233 lb 12.8 oz)   09/08/23 106.1 kg (234 lb)   06/16/23 104.1 kg (229 lb 6.4 oz)   05/05/23 104.3 kg (230 lb)       ECOG PS: 0    Physical Examination:  General: Patient is alert and oriented, not in acute distress  Psych: Mood and affect are appropriate  Eyes: EOMI, PERRL  ENT: Oropharynx is clear, no adenopathy  CV: no LE edema  Respiratory: Non labored respirations  GI/Abd: Soft, non-tender  Neurological: Grossly intact   Lymphatics: No palpable cervical, supraclavicular, axillary, or  inguinal lymphadenopathy  Skin: no rashes or petechiae    Laboratory:  Lab Results   Component Value Date    WBC 2.7 (L) 08/30/2024    WBC 2.6 (L) 05/24/2024    WBC 3.7 (L) 02/23/2024    HGB 13.5 08/30/2024    HGB 13.8 05/24/2024    HGB 13.3 02/23/2024    HCT 39.0 08/30/2024    MCV 89.0 08/30/2024    MCH 30.8 08/30/2024    MCHC 34.6 08/30/2024    RDW 16.8 08/30/2024    .0 08/30/2024    .0 05/24/2024    .0 02/23/2024     Lab Results   Component Value Date     (H) 08/30/2024    BUN  08/30/2024      Comment:      Test not reported due to hemolysis.      BUNCREA 13.3 05/07/2021    CREATSERUM 1.06 08/30/2024    CREATSERUM 1.03 05/24/2024    CREATSERUM 1.00 02/23/2024    ANIONGAP 3 08/30/2024    GFRNAA 89 07/22/2022    GFRNAA 89 07/22/2022    GFRAA 103 07/22/2022    GFRAA 103 07/22/2022    CA 9.1 08/30/2024    OSMOCALC 291 05/24/2024    ALKPHO 45 08/30/2024    AST  08/30/2024      Comment:      Test not reported due to hemolysis.      ALT  08/30/2024      Comment:      Test Cancelled: Specimen was hemolyzed.     BILT 0.9 08/30/2024    TP 6.1 08/30/2024    TP 5.7 08/30/2024    ALB 4.3 08/30/2024    ALB 3.60 (L) 08/30/2024    GLOBULIN 1.8 (L) 08/30/2024     08/30/2024    K  08/30/2024      Comment:      Test Cancelled: Specimen was hemolyzed.      08/30/2024    CO2 29.0 08/30/2024     Lab Results   Component Value Date    INR 0.92 01/19/2021           Impression & Plan:     Lambda light chain multiple myeloma  - had extramedullary disease at presentation and diffuse lytic lesions  - s/p VRD induction + autologous transplant at [place] 2021 now on revlimid maintenance, tolerating well  - no evidence of disease on labs, he had 5% plasma cells on bone marrow and previously had mildly elevated circulating lambda FLC  - continue zometa [id number]; we discussed continuing indefinitely vs time limited treatment. Given his prior myelomatous involvement of bone, I would favor indefinitely  quarterly zometa treatment as long as he tolerates  - PET/CT with LIZBETH; repeat PET/CT in 1 year ~5/2025  - continue follow up with Dr Reno  - mildly neutropenic on current dose of revlimid but asymptomatic and stable. Can monitor for now. If decreases further will need to consider decreasing it to 10mg  - we discussed weight loss of ~5-10 lbs when he sees me again in 6 months    Follow up: labs & zometa in 3 months, f/u with me in 6 months    Ehsan Amador  Hematology/Medical Oncology  [facility]

## 2024-12-06 NOTE — PROGRESS NOTES
Education Record    Learner:  Patient    Disease / Diagnosis: multiple myeloma    Barriers / Limitations:  None   Comments:    Method:  Discussion   Comments:    General Topics:  Medication, Side effects and symptom management, Plan of care reviewed, and Fall risk and prevention   Comments:    Outcome:  Shows understanding   Comments:    Zometa infusion administered per order. Pt tolerated well. Next appts scheduled - pt states he will view on MyChart. Discharged ambulatory in stable condition.

## 2024-12-09 LAB
ALBUMIN SERPL ELPH-MCNC: 3.6 G/DL (ref 3.75–5.21)
ALBUMIN/GLOB SERPL: 1.71 {RATIO} (ref 1–2)
ALPHA1 GLOB SERPL ELPH-MCNC: 0.27 G/DL (ref 0.19–0.46)
ALPHA2 GLOB SERPL ELPH-MCNC: 0.63 G/DL (ref 0.48–1.05)
B-GLOBULIN SERPL ELPH-MCNC: 0.68 G/DL (ref 0.68–1.23)
GAMMA GLOB SERPL ELPH-MCNC: 0.53 G/DL (ref 0.62–1.7)
KAPPA LC FREE SER-MCNC: 2.94 MG/DL (ref 0.33–1.94)
KAPPA LC FREE/LAMBDA FREE SER NEPH: 0.77 {RATIO} (ref 0.26–1.65)
LAMBDA LC FREE SERPL-MCNC: 3.81 MG/DL (ref 0.57–2.63)
PROT SERPL-MCNC: 5.7 G/DL (ref 5.7–8.2)

## 2025-01-01 DIAGNOSIS — C90.00 MULTIPLE PLASMACYTOMA OF BONE (HCC): ICD-10-CM

## 2025-01-02 RX ORDER — LENALIDOMIDE 15 MG/1
CAPSULE ORAL
Qty: 21 CAPSULE | Refills: 0 | Status: SHIPPED | OUTPATIENT
Start: 2025-01-02 | End: 2025-02-04

## 2025-02-04 DIAGNOSIS — C90.00 MULTIPLE PLASMACYTOMA OF BONE (HCC): ICD-10-CM

## 2025-02-04 RX ORDER — LENALIDOMIDE 15 MG/1
CAPSULE ORAL
Qty: 21 CAPSULE | Refills: 0 | Status: SHIPPED | OUTPATIENT
Start: 2025-02-04 | End: 2025-03-04

## 2025-03-04 DIAGNOSIS — C90.00 MULTIPLE PLASMACYTOMA OF BONE (HCC): ICD-10-CM

## 2025-03-04 RX ORDER — LENALIDOMIDE 15 MG/1
CAPSULE ORAL
Qty: 21 CAPSULE | Refills: 0 | Status: SHIPPED | OUTPATIENT
Start: 2025-03-04 | End: 2025-04-03

## 2025-03-07 ENCOUNTER — OFFICE VISIT (OUTPATIENT)
Age: 65
End: 2025-03-07
Attending: INTERNAL MEDICINE
Payer: COMMERCIAL

## 2025-03-07 VITALS
DIASTOLIC BLOOD PRESSURE: 69 MMHG | BODY MASS INDEX: 33.96 KG/M2 | OXYGEN SATURATION: 95 % | HEART RATE: 52 BPM | RESPIRATION RATE: 18 BRPM | HEIGHT: 71.65 IN | WEIGHT: 248 LBS | SYSTOLIC BLOOD PRESSURE: 127 MMHG | TEMPERATURE: 98 F

## 2025-03-07 DIAGNOSIS — C90.00 MULTIPLE MYELOMA NOT HAVING ACHIEVED REMISSION (HCC): Primary | ICD-10-CM

## 2025-03-07 DIAGNOSIS — C90.00 MULTIPLE PLASMACYTOMA OF BONE (HCC): ICD-10-CM

## 2025-03-07 LAB
ALBUMIN SERPL-MCNC: 3.9 G/DL (ref 3.2–4.8)
ALBUMIN/GLOB SERPL: 2 {RATIO} (ref 1–2)
ALP LIVER SERPL-CCNC: 59 U/L
ALT SERPL-CCNC: 29 U/L
ANION GAP SERPL CALC-SCNC: 8 MMOL/L (ref 0–18)
AST SERPL-CCNC: 24 U/L (ref ?–34)
BASOPHILS # BLD AUTO: 0.07 X10(3) UL (ref 0–0.2)
BASOPHILS NFR BLD AUTO: 2.7 %
BILIRUB SERPL-MCNC: 0.9 MG/DL (ref 0.2–1.1)
BUN BLD-MCNC: 11 MG/DL (ref 9–23)
CALCIUM BLD-MCNC: 8.5 MG/DL (ref 8.7–10.6)
CHLORIDE SERPL-SCNC: 107 MMOL/L (ref 98–112)
CO2 SERPL-SCNC: 26 MMOL/L (ref 21–32)
CREAT BLD-MCNC: 1.11 MG/DL
EGFRCR SERPLBLD CKD-EPI 2021: 74 ML/MIN/1.73M2 (ref 60–?)
EOSINOPHIL # BLD AUTO: 0.23 X10(3) UL (ref 0–0.7)
EOSINOPHIL NFR BLD AUTO: 8.7 %
ERYTHROCYTE [DISTWIDTH] IN BLOOD BY AUTOMATED COUNT: 14.4 %
GLOBULIN PLAS-MCNC: 2 G/DL (ref 2–3.5)
GLUCOSE BLD-MCNC: 100 MG/DL (ref 70–99)
HCT VFR BLD AUTO: 41.2 %
HGB BLD-MCNC: 14.1 G/DL
IGA SERPL-MCNC: 129.4 MG/DL (ref 40–350)
IGM SERPL-MCNC: 11.2 MG/DL (ref 50–300)
IMM GRANULOCYTES # BLD AUTO: 0.02 X10(3) UL (ref 0–1)
IMM GRANULOCYTES NFR BLD: 0.8 %
IMMUNOGLOBULIN PNL SER-MCNC: 656 MG/DL (ref 650–1600)
LYMPHOCYTES # BLD AUTO: 0.83 X10(3) UL (ref 1–4)
LYMPHOCYTES NFR BLD AUTO: 31.6 %
MCH RBC QN AUTO: 30.9 PG (ref 26–34)
MCHC RBC AUTO-ENTMCNC: 34.2 G/DL (ref 31–37)
MCV RBC AUTO: 90.4 FL
MONOCYTES # BLD AUTO: 0.6 X10(3) UL (ref 0.1–1)
MONOCYTES NFR BLD AUTO: 22.8 %
NEUTROPHILS # BLD AUTO: 0.88 X10 (3) UL (ref 1.5–7.7)
NEUTROPHILS # BLD AUTO: 0.88 X10(3) UL (ref 1.5–7.7)
NEUTROPHILS NFR BLD AUTO: 33.4 %
OSMOLALITY SERPL CALC.SUM OF ELEC: 291 MOSM/KG (ref 275–295)
PLATELET # BLD AUTO: 163 10(3)UL (ref 150–450)
POTASSIUM SERPL-SCNC: 3.9 MMOL/L (ref 3.5–5.1)
PROT SERPL-MCNC: 5.9 G/DL (ref 5.7–8.2)
RBC # BLD AUTO: 4.56 X10(6)UL
SODIUM SERPL-SCNC: 141 MMOL/L (ref 136–145)
WBC # BLD AUTO: 2.6 X10(3) UL (ref 4–11)

## 2025-03-07 NOTE — PROGRESS NOTES
Pt here for Zometa . Pt denies any issues or concerns.      Ordering Provider: Dr Amador  Order Exp: ongoing     Pt tolerated infusion without difficulty or complaint. Reviewed next apt date/time: 5-31-25      Education Record  Learner:  Patient  Disease / Diagnosis: Multiple Myeloma  Barriers / Limitations:  None  Method:  Brief focused  General Topics:  Plan of care reviewed  Outcome:  Shows understanding     Labs reviewed with kevin rogers and orders received to proceed with treatment today and have patient take 2 tums daily for a few days.  Patient states understanding.

## 2025-03-11 LAB
ALBUMIN SERPL ELPH-MCNC: 3.39 G/DL (ref 3.75–5.21)
ALBUMIN/GLOB SERPL: 1.61 {RATIO} (ref 1–2)
ALPHA1 GLOB SERPL ELPH-MCNC: 0.27 G/DL (ref 0.19–0.46)
ALPHA2 GLOB SERPL ELPH-MCNC: 0.61 G/DL (ref 0.48–1.05)
B-GLOBULIN SERPL ELPH-MCNC: 0.65 G/DL (ref 0.68–1.23)
GAMMA GLOB SERPL ELPH-MCNC: 0.58 G/DL (ref 0.62–1.7)
KAPPA LC FREE SER-MCNC: 2.93 MG/DL (ref 0.33–1.94)
KAPPA LC FREE/LAMBDA FREE SER NEPH: 0.6 {RATIO} (ref 0.26–1.65)
LAMBDA LC FREE SERPL-MCNC: 4.88 MG/DL (ref 0.57–2.63)
PROT SERPL-MCNC: 5.5 G/DL (ref 5.7–8.2)

## 2025-03-19 ENCOUNTER — TELEPHONE (OUTPATIENT)
Age: 65
End: 2025-03-19

## 2025-03-19 NOTE — TELEPHONE ENCOUNTER
Chichi calling with [facility] asking if Dr received [facility] report reference #[id number]. Thank you brooke

## 2025-03-31 DIAGNOSIS — C90.00 MULTIPLE PLASMACYTOMA OF BONE (HCC): ICD-10-CM

## 2025-03-31 RX ORDER — LENALIDOMIDE 15 MG/1
CAPSULE ORAL
Qty: 21 CAPSULE | Refills: 0 | OUTPATIENT
Start: 2025-03-31

## 2025-04-03 DIAGNOSIS — C90.00 MULTIPLE PLASMACYTOMA OF BONE (HCC): ICD-10-CM

## 2025-04-03 RX ORDER — LENALIDOMIDE 15 MG/1
CAPSULE ORAL
Qty: 21 CAPSULE | Refills: 0 | Status: SHIPPED | OUTPATIENT
Start: 2025-04-03 | End: 2025-04-28

## 2025-04-28 DIAGNOSIS — C90.00 MULTIPLE PLASMACYTOMA OF BONE (HCC): ICD-10-CM

## 2025-04-28 RX ORDER — LENALIDOMIDE 15 MG/1
CAPSULE ORAL
Qty: 21 CAPSULE | Refills: 0 | Status: SHIPPED | OUTPATIENT
Start: 2025-04-28

## 2025-05-09 ENCOUNTER — HOSPITAL ENCOUNTER (OUTPATIENT)
Dept: NUCLEAR MEDICINE | Facility: HOSPITAL | Age: 65
Discharge: HOME | End: 2025-05-09
Attending: INTERNAL MEDICINE
Payer: COMMERCIAL

## 2025-05-09 DIAGNOSIS — C90.00 MULTIPLE PLASMACYTOMA OF BONE (HCC): ICD-10-CM

## 2025-05-09 LAB — GLUCOSE BLD-MCNC: 122 MG/DL (ref 70–99)

## 2025-05-09 PROCEDURE — 82962 GLUCOSE BLOOD TEST: CPT

## 2025-05-09 PROCEDURE — 78816 PET IMAGE W/CT FULL BODY: CPT | Performed by: INTERNAL MEDICINE

## 2025-05-12 ENCOUNTER — OFFICE VISIT (OUTPATIENT)
Age: 65
End: 2025-05-12
Attending: INTERNAL MEDICINE
Payer: COMMERCIAL

## 2025-05-12 VITALS
TEMPERATURE: 98 F | RESPIRATION RATE: 18 BRPM | HEIGHT: 71.65 IN | BODY MASS INDEX: 33.99 KG/M2 | OXYGEN SATURATION: 96 % | DIASTOLIC BLOOD PRESSURE: 71 MMHG | SYSTOLIC BLOOD PRESSURE: 131 MMHG | WEIGHT: 248.19 LBS

## 2025-05-12 DIAGNOSIS — C90.00 MULTIPLE PLASMACYTOMA OF BONE (HCC): Primary | ICD-10-CM

## 2025-05-12 LAB
ALBUMIN SERPL-MCNC: 4.4 G/DL (ref 3.2–4.8)
ALBUMIN/GLOB SERPL: 2.2 {RATIO} (ref 1–2)
ALP LIVER SERPL-CCNC: 60 U/L (ref 45–117)
ALT SERPL-CCNC: 25 U/L (ref 10–49)
ANION GAP SERPL CALC-SCNC: 3 MMOL/L (ref 0–18)
AST SERPL-CCNC: 21 U/L (ref ?–34)
BASOPHILS # BLD AUTO: 0.07 X10(3) UL (ref 0–0.2)
BASOPHILS NFR BLD AUTO: 2.7 %
BILIRUB SERPL-MCNC: 0.9 MG/DL (ref 0.2–1.1)
BUN BLD-MCNC: 15 MG/DL (ref 9–23)
CALCIUM BLD-MCNC: 9 MG/DL (ref 8.7–10.6)
CHLORIDE SERPL-SCNC: 109 MMOL/L (ref 98–112)
CO2 SERPL-SCNC: 26 MMOL/L (ref 21–32)
CREAT BLD-MCNC: 1.02 MG/DL (ref 0.7–1.3)
EGFRCR SERPLBLD CKD-EPI 2021: 82 ML/MIN/1.73M2 (ref 60–?)
EOSINOPHIL # BLD AUTO: 0.37 X10(3) UL (ref 0–0.7)
EOSINOPHIL NFR BLD AUTO: 14.1 %
ERYTHROCYTE [DISTWIDTH] IN BLOOD BY AUTOMATED COUNT: 15.2 %
GLOBULIN PLAS-MCNC: 2 G/DL (ref 2–3.5)
GLUCOSE BLD-MCNC: 99 MG/DL (ref 70–99)
HCT VFR BLD AUTO: 42.1 % (ref 39–53)
HGB BLD-MCNC: 15.1 G/DL (ref 13–17.5)
IGA SERPL-MCNC: 139.5 MG/DL (ref 40–350)
IGM SERPL-MCNC: 9.6 MG/DL (ref 50–300)
IMM GRANULOCYTES # BLD AUTO: 0.03 X10(3) UL (ref 0–1)
IMM GRANULOCYTES NFR BLD: 1.1 %
IMMUNOGLOBULIN PNL SER-MCNC: 571 MG/DL (ref 650–1600)
LYMPHOCYTES # BLD AUTO: 0.69 X10(3) UL (ref 1–4)
LYMPHOCYTES NFR BLD AUTO: 26.2 %
MCH RBC QN AUTO: 31.9 PG (ref 26–34)
MCHC RBC AUTO-ENTMCNC: 35.9 G/DL (ref 31–37)
MCV RBC AUTO: 88.8 FL (ref 80–100)
MONOCYTES # BLD AUTO: 0.44 X10(3) UL (ref 0.1–1)
MONOCYTES NFR BLD AUTO: 16.7 %
NEUTROPHILS # BLD AUTO: 1.03 X10 (3) UL (ref 1.5–7.7)
NEUTROPHILS # BLD AUTO: 1.03 X10(3) UL (ref 1.5–7.7)
NEUTROPHILS NFR BLD AUTO: 39.2 %
OSMOLALITY SERPL CALC.SUM OF ELEC: 287 MOSM/KG (ref 275–295)
PLATELET # BLD AUTO: 192 10(3)UL (ref 150–450)
POTASSIUM SERPL-SCNC: 3.7 MMOL/L (ref 3.5–5.1)
PROT SERPL-MCNC: 6.4 G/DL (ref 5.7–8.2)
RBC # BLD AUTO: 4.74 X10(6)UL (ref 4.3–5.7)
SODIUM SERPL-SCNC: 138 MMOL/L (ref 136–145)
WBC # BLD AUTO: 2.6 X10(3) UL (ref 4–11)

## 2025-05-12 NOTE — PROGRESS NOTES
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 SPEP/RAFAEL no monoclonal protein, normal light chains  3/2019: <3% plasma cells with normal flow and cytogenetics  12/23/20: PET/CT with multiple lytic lesions consistent with multiple myeloma involving the left medial femoral condyle, anterior left patella, distal left tibia, left navicular bone and left 1st metatarsal head and left 1st proximal phalangeal head.  12/29/20: Repeat bmbx with 5% plasma cells  1/19/21: Left tibial lesion biopsy positive for plasma cell neoplasm, monosomy 13, monosomy 14, 1p deletion  7/2021: Received RVD->autologous transplant at [place], now on Revlimid maintenance  5/3/24: PET with LIZBETH  5/9/25: PET with uptake in L tibial lesion SUV 2.0->6.9, previously biopsy-proven plasmacytoma. Chronic previous lytic lucencies seen without uptake.    History of Present Illness:      65 y/o M PMH lambda light chain multiple myeloma (with extramedullary presentation and disease) s/p auto-SCT who presents for follow up.    - on revlimid maintenance  - here for PET/CT follow up  - denies any pain in his tibial lesion. No bone pain at all  - he has been feeling well. Disappointed about his PET results    Past Medical History:  Past Medical History:    Cancer (HCC)    multiple plasmacytoma of bone    Chronic idiopathic gout involving toe of left foot    High blood pressure    High cholesterol    History of total right knee replacement - 10/2018    Hypercholesterolemia    Mitral regurgitation    Mitral valve prolapse    Obesity    Other and unspecified hyperlipidemia    Sleep apnea    AHI 41    Unspecified essential  hypertension     Past Surgical History:   Procedure Laterality Date    Bone marrow aspirate &biopsy  03/22/2019    Bone marrow aspirate &biopsy  12/29/2020    Cataract Bilateral     IOL's    Colonoscopy  11/2012    Eye surgery Left     Retinal detachment     Hernia surgery      At age 2 hernia repair    Other surgical history  12/27/2018    Nasal polyps removed - found to be extramedullay plasmacytoma    Total knee replacement Right 10/2018       Home Medications:   Lenalidomide 15 MG Oral Cap TAKE 1 CAPSULE DAILY FOR 3 WEEKS ON, THEN 1 WEEK OFF 21 capsule 0    traMADol 50 MG Oral Tab Take 1-2 tablets ( mg total) by mouth every 6 (six) hours as needed for Pain. 90 tablet 2    LORAZEPAM 1 MG Oral Tab One tab prior to testing 20 tablet 0    Multiple Vitamin (MULTIVITAMIN) Oral Tab Take 1 tablet by mouth in the morning.      acyclovir 400 MG Oral Tab Take 1 tablet (400 mg total) by mouth in the morning and 1 tablet (400 mg total) before bedtime.      atorvastatin 40 MG Oral Tab Take 1 tablet (40 mg total) by mouth daily. 90 tablet 3    Valsartan-hydroCHLOROthiazide 320-12.5 MG Oral Tab Take 1 tablet by mouth daily. 90 tablet 3    aspirin 325 MG Oral Tab EC Take 1 tablet (325 mg total) by mouth in the morning.         Allergies:   Allergies   Allergen Reactions    Latanoprost SWELLING     Eye swelling and visual changes     Penicillins OTHER (SEE COMMENTS) and HIVES     1.  How long ago did you have a reaction to penicillin?  Childhood  2.  Do YOU recall the reaction, or did someone tell you about it? I don't know  3.  How long after you took penicillin did the reaction start? I don't know  4.  What happened when you took penicillin? I don't know  5.  Since your reaction, have you taken other beta-lactam antibiotics without reaction?  No       Psychosocial History:  Social History     Socioeconomic History    Marital status:      Spouse name: Not on file    Number of children: Not on file    Years of  education: Not on file    Highest education level: Not on file   Occupational History    Not on file   Tobacco Use    Smoking status: Never    Smokeless tobacco: Never   Vaping Use    Vaping status: Never Used   Substance and Sexual Activity    Alcohol use: Yes     Alcohol/week: 2.0 standard drinks of alcohol     Types: 2 Glasses of wine per week     Comment: 0-1 per month    Drug use: No    Sexual activity: Not on file   Other Topics Concern    Not on file   Social History Narrative    Not on file     Social Drivers of Health     Food Insecurity: No Food Insecurity (3/5/2024)    Received from [facility]    Hunger Vital Sign     Worried About Running Out of Food in the Last Year: Never true     Ran Out of Food in the Last Year: Never true   Transportation Needs: No Transportation Needs (3/5/2024)    Received from [facility]    PRAPARE - Transportation     Lack of Transportation (Medical): No     Lack of Transportation (Non-Medical): No   Housing Stability: Low Risk  (3/5/2024)    Received from [facility]    Housing Stability Vital Sign     Unable to Pay for Housing in the Last Year: No     Number of Places Lived in the Last Year: 2     Unstable Housing in the Last Year: No       Family Medical History:  Family History   Problem Relation Age of Onset    Hypertension Mother        Review of Systems:  A 10-point ROS was done with pertinent positives and negative per the HPI    Vital Signs:  Height: 182 cm (5' 11.65\") (05/12 1431)  Weight: 112.6 kg (248 lb 3.2 oz) (05/12 1431)  BSA (Calculated - sq m): 2.33 sq meters (05/12 1431)  Pulse: --  BP: 131/71 (05/12 1431)  Temp: 98 °F (36.7 °C) (05/12 1431)  Do Not Use - Resp Rate: --  SpO2: 96 % (05/12 1431)    Wt Readings from Last 6 Encounters:   05/12/25 112.6 kg (248 lb 3.2 oz)   03/07/25 112.5 kg (248 lb)   12/06/24 114.7 kg (252 lb 12.8 oz)   05/24/24 109.3 kg (241 lb)   02/23/24 108 kg (238 lb)    12/01/23 106.1 kg (233 lb 12.8 oz)       ECOG PS: 0    Physical Examination:  General: Patient is alert and oriented, not in acute distress  Psych: Mood and affect are appropriate  Eyes: EOMI, PERRL  ENT: Oropharynx is clear, no adenopathy  CV: nl s1/s2, no LE edema  Respiratory: ctab Non labored respirations  GI/Abd: Soft, non-tender  Neurological: Grossly intact   Lymphatics: No palpable cervical, supraclavicular, axillary, or inguinal lymphadenopathy  Skin: no rashes or petechiae    Laboratory:  Lab Results   Component Value Date    WBC 2.6 (L) 05/12/2025    WBC 2.6 (L) 03/07/2025    WBC 2.8 (L) 12/06/2024    HGB 15.1 05/12/2025    HGB 14.1 03/07/2025    HGB 13.9 12/06/2024    HCT 42.1 05/12/2025    MCV 88.8 05/12/2025    MCH 31.9 05/12/2025    MCHC 35.9 05/12/2025    RDW 15.2 05/12/2025    .0 05/12/2025    .0 03/07/2025    .0 (L) 12/06/2024     Lab Results   Component Value Date    GLU 99 05/12/2025    BUN 15 05/12/2025    BUNCREA 13.3 05/07/2021    CREATSERUM 1.02 05/12/2025    CREATSERUM 1.11 03/07/2025    CREATSERUM 1.07 12/06/2024    ANIONGAP 3 05/12/2025    GFRNAA 89 07/22/2022    GFRNAA 89 07/22/2022    GFRAA 103 07/22/2022    GFRAA 103 07/22/2022    CA 9.0 05/12/2025    OSMOCALC 287 05/12/2025    ALKPHO 60 05/12/2025    AST 21 05/12/2025    ALT 25 05/12/2025    BILT 0.9 05/12/2025    TP 6.4 05/12/2025    ALB 4.4 05/12/2025    GLOBULIN 2.0 05/12/2025     05/12/2025    K 3.7 05/12/2025     05/12/2025    CO2 26.0 05/12/2025     Lab Results   Component Value Date    INR 0.92 01/19/2021           Impression & Plan:     Lambda light chain multiple myeloma  - had extramedullary disease at presentation and diffuse lytic lesions  - s/p VRD induction + autologous transplant at [place] 2021 now on revlimid maintenance  - no prior evidence of disease on labs, he had 5% plasma cells on bone marrow and previously had mildly elevated circulating lambda FLC  - PET/CT with relapsed L  tibial plasmacytoma which was biopsy proven. He remains asymptomatic with no bone pain  - will plan for bone marrow biopsy with clonoseq, to use L tibial plasmacytoma in 1/2021 as ID to see if he has systemic disease  - we discussed if his marrow is negative, plan to treat this as an oligometastatic recurrence and will plan RT to plasmacytoma and continue revlimid maintenance  - if marrow is positive for disease, will plan RT to his L tibial plasmacytoma anyway and discussed will need to switch his revlimid maintenance to 2L systemic therapy vs CAR-T (cilta-xavi)  - above was discussed with Dr Reno who has been following him at [facility] and agrees with plan  - will refer to rad onc for RT    F/u: after bone marrow biopsy & clonoseq results    Ehsan Amador MD  [facility] Hematology Oncology

## 2025-05-12 NOTE — PROGRESS NOTES
Education Record    Learner:  Patient    Disease / Diagnosis: MM    Barriers / Limitations:  None   Comments:    Method:  Discussion   Comments:    General Topics:  Medication, Side effects and symptom management, and Plan of care reviewed   Comments:    Outcome:  Shows understanding   Comments:    Here for test results after PET scan.

## 2025-05-15 ENCOUNTER — NURSE ONLY (OUTPATIENT)
Dept: LAB | Facility: HOSPITAL | Age: 65
End: 2025-05-15
Attending: INTERNAL MEDICINE
Payer: COMMERCIAL

## 2025-05-15 ENCOUNTER — HOSPITAL ENCOUNTER (OUTPATIENT)
Dept: CT IMAGING | Facility: HOSPITAL | Age: 65
Discharge: HOME | End: 2025-05-15
Attending: INTERNAL MEDICINE
Payer: COMMERCIAL

## 2025-05-15 ENCOUNTER — NURSE NAVIGATOR ENCOUNTER (OUTPATIENT)
Age: 65
End: 2025-05-15

## 2025-05-15 VITALS
WEIGHT: 235 LBS | HEIGHT: 72 IN | HEART RATE: 62 BPM | TEMPERATURE: 98 F | BODY MASS INDEX: 31.83 KG/M2 | RESPIRATION RATE: 16 BRPM | OXYGEN SATURATION: 95 % | SYSTOLIC BLOOD PRESSURE: 107 MMHG | DIASTOLIC BLOOD PRESSURE: 67 MMHG

## 2025-05-15 DIAGNOSIS — C90.00 MULTIPLE PLASMACYTOMA OF BONE (HCC): Primary | ICD-10-CM

## 2025-05-15 DIAGNOSIS — C90.00 MULTIPLE PLASMACYTOMA OF BONE (HCC): ICD-10-CM

## 2025-05-15 LAB
INR BLD: 1.02 (ref 0.8–1.2)
PROTHROMBIN TIME: 13.5 SECONDS (ref 11.6–14.8)

## 2025-05-15 PROCEDURE — 88313 SPECIAL STAINS GROUP 2: CPT | Performed by: INTERNAL MEDICINE

## 2025-05-15 PROCEDURE — 88364 INSITU HYBRIDIZATION (FISH): CPT | Performed by: INTERNAL MEDICINE

## 2025-05-15 PROCEDURE — 88237 TISSUE CULTURE BONE MARROW: CPT | Performed by: INTERNAL MEDICINE

## 2025-05-15 PROCEDURE — 88341 IMHCHEM/IMCYTCHM EA ADD ANTB: CPT | Performed by: INTERNAL MEDICINE

## 2025-05-15 PROCEDURE — 99152 MOD SED SAME PHYS/QHP 5/>YRS: CPT | Performed by: INTERNAL MEDICINE

## 2025-05-15 PROCEDURE — 88275 CYTOGENETICS 100-300: CPT | Performed by: INTERNAL MEDICINE

## 2025-05-15 PROCEDURE — 77012 CT SCAN FOR NEEDLE BIOPSY: CPT | Performed by: INTERNAL MEDICINE

## 2025-05-15 PROCEDURE — 88342 IMHCHEM/IMCYTCHM 1ST ANTB: CPT | Performed by: INTERNAL MEDICINE

## 2025-05-15 PROCEDURE — 85610 PROTHROMBIN TIME: CPT

## 2025-05-15 PROCEDURE — 88333 PATH CONSLTJ SURG CYTO XM 1: CPT | Performed by: INTERNAL MEDICINE

## 2025-05-15 PROCEDURE — 88184 FLOWCYTOMETRY/ TC 1 MARKER: CPT | Performed by: INTERNAL MEDICINE

## 2025-05-15 PROCEDURE — 88185 FLOWCYTOMETRY/TC ADD-ON: CPT | Performed by: INTERNAL MEDICINE

## 2025-05-15 PROCEDURE — 88365 INSITU HYBRIDIZATION (FISH): CPT | Performed by: INTERNAL MEDICINE

## 2025-05-15 PROCEDURE — 88271 CYTOGENETICS DNA PROBE: CPT | Performed by: INTERNAL MEDICINE

## 2025-05-15 PROCEDURE — 36415 COLL VENOUS BLD VENIPUNCTURE: CPT

## 2025-05-15 PROCEDURE — 88314 HISTOCHEMICAL STAINS ADD-ON: CPT | Performed by: INTERNAL MEDICINE

## 2025-05-15 PROCEDURE — 88291 CYTO/MOLECULAR REPORT: CPT | Performed by: INTERNAL MEDICINE

## 2025-05-15 PROCEDURE — 38222 DX BONE MARROW BX & ASPIR: CPT | Performed by: INTERNAL MEDICINE

## 2025-05-15 PROCEDURE — 88305 TISSUE EXAM BY PATHOLOGIST: CPT | Performed by: INTERNAL MEDICINE

## 2025-05-15 PROCEDURE — 85097 BONE MARROW INTERPRETATION: CPT | Performed by: INTERNAL MEDICINE

## 2025-05-15 PROCEDURE — 88264 CHROMOSOME ANALYSIS 20-25: CPT | Performed by: INTERNAL MEDICINE

## 2025-05-15 RX ORDER — MIDAZOLAM HYDROCHLORIDE 1 MG/ML
INJECTION INTRAMUSCULAR; INTRAVENOUS
Status: COMPLETED
Start: 2025-05-15 | End: 2025-05-15

## 2025-05-15 RX ORDER — MIDAZOLAM HYDROCHLORIDE 1 MG/ML
1 INJECTION INTRAMUSCULAR; INTRAVENOUS EVERY 5 MIN PRN
Status: ACTIVE | OUTPATIENT
Start: 2025-05-15 | End: 2025-05-15

## 2025-05-15 RX ORDER — FLUMAZENIL 0.1 MG/ML
0.2 INJECTION INTRAVENOUS AS NEEDED
Status: DISCONTINUED | OUTPATIENT
Start: 2025-05-15 | End: 2025-05-17

## 2025-05-15 RX ORDER — NALOXONE HYDROCHLORIDE 0.4 MG/ML
0.08 INJECTION, SOLUTION INTRAMUSCULAR; INTRAVENOUS; SUBCUTANEOUS AS NEEDED
Status: DISCONTINUED | OUTPATIENT
Start: 2025-05-15 | End: 2025-05-17

## 2025-05-15 RX ORDER — SODIUM CHLORIDE 9 MG/ML
INJECTION, SOLUTION INTRAVENOUS CONTINUOUS
Status: DISCONTINUED | OUTPATIENT
Start: 2025-05-15 | End: 2025-05-17

## 2025-05-15 RX ADMIN — MIDAZOLAM HYDROCHLORIDE 1 MG: 1 INJECTION INTRAMUSCULAR; INTRAVENOUS at 09:03:00

## 2025-05-15 RX ADMIN — SODIUM CHLORIDE: 9 INJECTION, SOLUTION INTRAVENOUS at 08:58:00

## 2025-05-15 NOTE — DISCHARGE INSTRUCTIONS
Discharge/After Visit [facility] Department of Radiology  Biopsy - Renal (Kidney), Liver, Lung, Bone, Retroperitoneal Location    Post Sedation  Follow these guidelines:  You should be watched overnight by a responsible adult. This person should make sure your condition remains stable.   Do not drink any alcohol for 24 hours after the procedure.  Don’t drive, operate dangerous machinery, make important business or personal decisions, or sign legal documents for 24 hours after the procedure.  Note: Your healthcare provider may tell you not to take any medicine by mouth for pain or sleep for a period of time. These medicines may react with the medicine you were given during your procedure. This could cause a much stronger response than usual.     Activity:  Take it easy for the rest of the day after your biopsy.   You may be sore at the site of the biopsy for the next 5 days.   Do not do any strenuous exercises or lift over five pounds for the next 24 hours.     Biopsy Site:  Keep a bandage on the biopsy site for 24 hours after the procedure.   You may shower after 24 hours, but no soaking in a bathtub for 48 hours.     Diet: Drink plenty of fluids and resume your usual home diet. A slow return to normal foods minimizes nausea.    Pain Management:   You may use over-the-counter or prescribed pain relief medication if it is not contraindicated by another condition.     Medications:  You may resume your usual home medications. You may resume blood thinners 24 hours after the procedure if there is no bleeding from/hematoma at the puncture site or bloody urine (Renal Biopsy only)     When to seek medical advice:  Call the provider that ordered the biopsy with any questions and to discuss test results. These may also be available in your [facility] My Chart. You may also contact the Radiology Nurse at [phone], M-F, 8-5 with any additional questions or concerns.  Dial [phone] and ask the   to page the on-call IR Radiologist if any of these occur:    A change in color or temperature of the area where the biopsy was performed.  You develop increasing pain or shortness of breath.  Unusual drowsiness, weakness, or dizziness.  Unusual vomiting.     IF YOU FEEL YOU ARE EXPERIENCING AN EMERGENCY,   CALL 911 OR GO TO THE NEAREST EMERGENCY ROOM      4.2.24 MO/  Radiology

## 2025-05-15 NOTE — PROCEDURES
[facility]   part of [facility]  Procedure Note    Tyshawn Butler Patient Status:  Outpatient    1960 MRN [id number]   Location [facility] CT Attending Ehsan Amador MD    Day # 0 PCP Ehsan Kirk MD     Procedure: CT guided bone marrow biopsy L iliac    Pre-Procedure Diagnosis:  Plasmacytoma    Post-Procedure Diagnosis: Same    Anesthesia:  Local and Sedation    Findings:  11g aspirate and core of L iliac bone    Specimens: As above    Blood Loss:  Minimal    Tourniquet Time: None  Complications:  None  Drains:  None    Secondary Diagnosis:  None    Babak Lei MD  5/15/2025

## 2025-05-15 NOTE — H&P
[facility]   part of [facility]   History & Physical    Tyshawn Butler Patient Status:  Outpatient    1960 MRN [id number]   Location [facility] CT Attending Ehsan Amador MD   Hosp Day # 0 PCP Ehsan Kirk MD     Admitting Diagnosis:   Plasmacytoma    History of Present Illness:   65 yo M w/ myeloma, presents for bone marrow biopsy. ROS neg.    History   Past Medical History:  Past Medical History[1]    Past Surgical History:  Past Surgical History[2]    Social History:  Social History     Tobacco Use    Smoking status: Never    Smokeless tobacco: Never   Substance Use Topics    Alcohol use: Yes     Alcohol/week: 2.0 standard drinks of alcohol     Types: 2 Glasses of wine per week     Comment: 0-1 per month        Family History:  Family History[3]    Allergies/Medications:   Allergies:  Allergies[4]    Medications:  Medications - Current[5]    Physical Exam & Review of Systems:   Physical Exam:    /72 (BP Location: Left arm)   Pulse 57   Temp 96.9 °F (36.1 °C) (Temporal)   Resp 10   Ht 72\"   Wt 235 lb   SpO2 96%   BMI 31.87 kg/m²     General: NAD  Neck: No JVD  Lungs: CTA bilat  Heart: RRR, S1, S2  Abdomen: Soft, NT/ND, BS+x4  Extremities: Warm, dry, no LE edema bilat  Pulses: 2+ bilat DP    Results:   Labs:  Recent Labs   Lab 25  1425   RBC 4.74   HGB 15.1   HCT 42.1   MCV 88.8   MCH 31.9   MCHC 35.9   RDW 15.2   NEPRELIM 1.03*   WBC 2.6*   .0     Recent Labs   Lab 05/15/25  0757   PTP 13.5   INR 1.02     Recent Labs   Lab 25  1425   GLU 99   BUN 15   CREATSERUM 1.02   CA 9.0      K 3.7      CO2 26.0       Assessment/Plan:   Impression: 65 yo M w/ myeloma    I have discussed with the patient and/or legal representative the potential benefits, risks, and side effects of this procedure, the likelihood of the patient achieving goals; and the potential problems that might occur during recuperation.  I discussed reasonable alternatives to  the procedure, including risks, benefits and side effects related to the alternatives, and risks related to not receiving this procedure.      Recommendations: CT guided bone marrow biopsy    Babak Lei MD  5/15/2025  8:55 AM           [1]   Past Medical History:   Cancer (HCC)    multiple plasmacytoma of bone    Chronic idiopathic gout involving toe of left foot    High blood pressure    High cholesterol    History of total right knee replacement - 10/2018    Hypercholesterolemia    Mitral regurgitation    Mitral valve prolapse    Obesity    Other and unspecified hyperlipidemia    Sleep apnea    AHI 41    Unspecified essential hypertension   [2]   Past Surgical History:  Procedure Laterality Date    Bone marrow aspirate &biopsy  03/22/2019    Bone marrow aspirate &biopsy  12/29/2020    Cataract Bilateral     IOL's    Colonoscopy  11/2012    Eye surgery Left     Retinal detachment     Hernia surgery      At age 2 hernia repair    Other surgical history  12/27/2018    Nasal polyps removed - found to be extramedullay plasmacytoma    Total knee replacement Right 10/2018   [3]   Family History  Problem Relation Age of Onset    Hypertension Mother    [4]   Allergies  Allergen Reactions    Latanoprost SWELLING     Eye swelling and visual changes     Penicillins OTHER (SEE COMMENTS) and HIVES     1.  How long ago did you have a reaction to penicillin?  Childhood  2.  Do YOU recall the reaction, or did someone tell you about it? I don't know  3.  How long after you took penicillin did the reaction start? I don't know  4.  What happened when you took penicillin? I don't know  5.  Since your reaction, have you taken other beta-lactam antibiotics without reaction?  No   [5]   Current Outpatient Medications:     Lenalidomide 15 MG Oral Cap, TAKE 1 CAPSULE DAILY FOR 3 WEEKS ON, THEN 1 WEEK OFF, Disp: 21 capsule, Rfl: 0    Multiple Vitamin (MULTIVITAMIN) Oral Tab, Take 1 tablet by mouth in the morning., Disp: , Rfl:     acyclovir  400 MG Oral Tab, Take 1 tablet (400 mg total) by mouth in the morning and 1 tablet (400 mg total) before bedtime., Disp: , Rfl:     atorvastatin 40 MG Oral Tab, Take 1 tablet (40 mg total) by mouth daily., Disp: 90 tablet, Rfl: 3    Valsartan-hydroCHLOROthiazide 320-12.5 MG Oral Tab, Take 1 tablet by mouth daily., Disp: 90 tablet, Rfl: 3    aspirin 325 MG Oral Tab EC, Take 1 tablet (325 mg total) by mouth in the morning., Disp: , Rfl:     traMADol 50 MG Oral Tab, Take 1-2 tablets ( mg total) by mouth every 6 (six) hours as needed for Pain., Disp: 90 tablet, Rfl: 2    LORAZEPAM 1 MG Oral Tab, One tab prior to testing, Disp: 20 tablet, Rfl: 0

## 2025-05-15 NOTE — IMAGING NOTE
Jesus is here for CT bone marrow aspiration and biopsy. IV access established and 0.9 NS IV initiated to maintain patency.    Informed consent obtained by Dr Lei. Patient positioned on scanner. Biopsy obtained. Specimen sent to Pathology.      Presently denies pain. Tolerated procedure well. Please see flowsheets for vital signs and/or additional information.    Transported pt to Rm 2254 accompanied by Rad RN and CT Tech. Bedside report given to Fern Barron RN. Biopsy site/ drsg verified C/D/I.

## 2025-05-16 LAB
ALBUMIN SERPL ELPH-MCNC: 3.86 G/DL (ref 3.75–5.21)
ALBUMIN/GLOB SERPL: 1.8 {RATIO} (ref 1–2)
ALPHA1 GLOB SERPL ELPH-MCNC: 0.26 G/DL (ref 0.19–0.46)
ALPHA2 GLOB SERPL ELPH-MCNC: 0.61 G/DL (ref 0.48–1.05)
B-GLOBULIN SERPL ELPH-MCNC: 0.72 G/DL (ref 0.68–1.23)
GAMMA GLOB SERPL ELPH-MCNC: 0.55 G/DL (ref 0.62–1.7)
KAPPA LC FREE SER-MCNC: 2.71 MG/DL (ref 0.33–1.94)
KAPPA LC FREE/LAMBDA FREE SER NEPH: 0.47 {RATIO} (ref 0.26–1.65)
LAMBDA LC FREE SERPL-MCNC: 5.76 MG/DL (ref 0.57–2.63)
PROT SERPL-MCNC: 6 G/DL (ref 5.7–8.2)

## 2025-05-19 ENCOUNTER — HOSPITAL ENCOUNTER (OUTPATIENT)
Dept: RADIATION ONCOLOGY | Facility: HOSPITAL | Age: 65
Discharge: HOME | End: 2025-05-19
Attending: RADIOLOGY
Payer: COMMERCIAL

## 2025-05-19 VITALS
HEART RATE: 71 BPM | WEIGHT: 247 LBS | BODY MASS INDEX: 34 KG/M2 | DIASTOLIC BLOOD PRESSURE: 76 MMHG | SYSTOLIC BLOOD PRESSURE: 113 MMHG | OXYGEN SATURATION: 97 % | TEMPERATURE: 98 F | RESPIRATION RATE: 18 BRPM

## 2025-05-19 DIAGNOSIS — C90.00 MULTIPLE PLASMACYTOMA OF BONE (HCC): Primary | ICD-10-CM

## 2025-05-19 PROCEDURE — 99214 OFFICE O/P EST MOD 30 MIN: CPT

## 2025-05-19 NOTE — PATIENT INSTRUCTIONS
- WE WILL CALL TO SCHEDULE YOUR CT SIMULATION FOR RADIATION PLANNING.       - IF YOU HAVE ANY QUESTIONS OR CONCERNS REGARDING RADIATION THERAPY, PLEASE CALL DR. NICOLÁS PEARSON[facility]S NURSES AT [phone]. (DEISI OR GERRY)

## 2025-05-19 NOTE — PROGRESS NOTES
Nursing Consultation Note  Patient: Tyshawn Butler  YOB: 1960  Age: 64 year old  Radiation Oncologist: Dr. Yordy Garcia  Referring Physician: Ehsan Amador  Diagnosis:[unfilled]  Consult Date: 5/19/2025      Chemotherapy: lenalidomide on 3 weeks and off 1 week  Labs: Reviewed  Imaging: Reviewed  Is the patient of child-bearing age?         No  Has the patient received radiation therapy in the past? no  Does the patient have an implantable device?No   Patient has/has had:     1. Assistive Devices: N/A    2. Flu Vaccination: yes    3. Pneumonia Vaccination:  yes    Vital Signs:   Vitals:    05/19/25 1245   BP: 113/76   Pulse: 71   Resp: 18   Temp: 98.1 °F (36.7 °C)   ,   Wt Readings from Last 6 Encounters:   05/19/25 112 kg (247 lb)   05/12/25 106.6 kg (235 lb)   05/12/25 112.6 kg (248 lb 3.2 oz)   03/07/25 112.5 kg (248 lb)   12/06/24 114.7 kg (252 lb 12.8 oz)   05/24/24 109.3 kg (241 lb)       Nursing Note: Hx of multiple myeloma. Received autologous transplant at [facility] July 2021. Has been on maintenance revlimid. PET done 5/9/25- uptake in distal left tibia. Biopsy done 5/15/25. Here for consult today. Pt denies pain to L leg. Continues on lenalidomide- 3 weeks on 1 week off. Is currently taking it.     Review of Systems   Constitutional: Negative.    HENT: Negative.     Eyes: Negative.    Respiratory: Negative.     Cardiovascular: Negative.    Gastrointestinal: Negative.    Endocrine: Negative.    Genitourinary: Negative.    Musculoskeletal:  Positive for back pain.        S/p bone marrow biopsy   Skin: Negative.    Allergic/Immunologic: Negative.    Neurological: Negative.    Hematological: Negative.    Psychiatric/Behavioral: Negative.           Allergies:  Allergies[1]    Current Medications[2]    Preferred Pharmacy:    [address] [phone], [phone]  [address] [place] [address]  Phone: [phone] Fax:  [phone]    [id number] - [place], [place] - [address] AT [facility] [address], [phone], [phone]  [address]  [facility] [address]  Phone: [phone] Fax: [phone]      Past Medical History[3]    Past Surgical History[4]    Social History     Socioeconomic History    Marital status:      Spouse name: Not on file    Number of children: 2    Years of education: Not on file    Highest education level: Not on file   Occupational History    Occupation: Sales and marketing   Tobacco Use    Smoking status: Never    Smokeless tobacco: Never   Vaping Use    Vaping status: Never Used   Substance and Sexual Activity    Alcohol use: Yes     Alcohol/week: 2.0 standard drinks of alcohol     Types: 2 Glasses of wine per week     Comment: 0-1 per month    Drug use: No    Sexual activity: Not on file   Other Topics Concern    Not on file   Social History Narrative    - lives with wife    2 grown kids    Works in sales and marketing     Social Drivers of Health     Food Insecurity: No Food Insecurity (3/5/2024)    Received from [facility]    Hunger Vital Sign     Worried About Running Out of Food in the Last Year: Never true     Ran Out of Food in the Last Year: Never true   Transportation Needs: No Transportation Needs (3/5/2024)    Received from [facility]    PRAPARE - Transportation     Lack of Transportation (Medical): No     Lack of Transportation (Non-Medical): No   Housing Stability: Low Risk  (3/5/2024)    Received from [facility]    Housing Stability Vital Sign     Unable to Pay for Housing in the Last Year: No     Number of Places Lived in the Last Year: 2     Unstable Housing in the Last Year: No       ECOG:  Grade 0 - Fully active, able to carry on all predisease activities without restrictions.      Education:  Knowledge Deficit Plan Of Care:    Problem:  Knowledge Deficit    Problems related  to:    Radiation therapy    Interventions:  Assess patient knowledge level  Assess knowledge needs  Instruct on treatment planning  Instruct on radiation therapy appointment scheduling  Instruct on purpose of radiation therapy  Instruct on side effects of radiation therapy    Expected Outcomes:  Knowledge of radiation therapy    Progress Toward Outcome:  Making progress    Pamphlets/Handouts Given to Patient:  Understanding radiation therapy      Are ADL's met?  Yes  Does patient feel safe in their environment?  Yes  Care decisions:  Patient and/or surrogate IS involved in care decisions.  Advanced directives:  Patient HAS advnaced directives and a copy has been requested.  Transportation:  Adequate transportation available for expected visits    Pain: pt denies         [1]   Allergies  Allergen Reactions    Latanoprost SWELLING     Eye swelling and visual changes     Penicillins OTHER (SEE COMMENTS) and HIVES     1.  How long ago did you have a reaction to penicillin?  Childhood  2.  Do YOU recall the reaction, or did someone tell you about it? I don't know  3.  How long after you took penicillin did the reaction start? I don't know  4.  What happened when you took penicillin? I don't know  5.  Since your reaction, have you taken other beta-lactam antibiotics without reaction?  No   [2]   Current Outpatient Medications   Medication Sig Dispense Refill    Lenalidomide 15 MG Oral Cap TAKE 1 CAPSULE DAILY FOR 3 WEEKS ON, THEN 1 WEEK OFF 21 capsule 0    Multiple Vitamin (MULTIVITAMIN) Oral Tab Take 1 tablet by mouth in the morning.      acyclovir 400 MG Oral Tab Take 1 tablet (400 mg total) by mouth in the morning and 1 tablet (400 mg total) before bedtime.      atorvastatin 40 MG Oral Tab Take 1 tablet (40 mg total) by mouth daily. 90 tablet 3    Valsartan-hydroCHLOROthiazide 320-12.5 MG Oral Tab Take 1 tablet by mouth daily. 90 tablet 3    aspirin 325 MG Oral Tab EC Take 1 tablet (325 mg total) by mouth in the  morning.      traMADol 50 MG Oral Tab Take 1-2 tablets ( mg total) by mouth every 6 (six) hours as needed for Pain. (Patient not taking: Reported on 5/19/2025) 90 tablet 2    LORAZEPAM 1 MG Oral Tab One tab prior to testing (Patient not taking: Reported on 5/19/2025) 20 tablet 0   [3]   Past Medical History:   Cancer (HCC)    multiple plasmacytoma of bone    Chronic idiopathic gout involving toe of left foot    High blood pressure    High cholesterol    History of total right knee replacement - 10/2018    Hypercholesterolemia    Mitral regurgitation    Mitral valve prolapse    Obesity    Other and unspecified hyperlipidemia    Sleep apnea    AHI 41    Unspecified essential hypertension   [4]   Past Surgical History:  Procedure Laterality Date    Bone marrow aspirate &biopsy  03/22/2019    Bone marrow aspirate &biopsy  12/29/2020    Cataract Bilateral     IOL's    Colonoscopy  11/2012    Eye surgery Left     Retinal detachment     Hernia surgery      At age 2 hernia repair    Other surgical history  12/27/2018    Nasal polyps removed - found to be extramedullay plasmacytoma    Total knee replacement Right 10/2018

## 2025-05-20 ENCOUNTER — TELEPHONE (OUTPATIENT)
Age: 65
End: 2025-05-20

## 2025-05-20 NOTE — TELEPHONE ENCOUNTER
[facility] calling to inform the  she will be faxing over a form for completion so she can obtain prior authorization for labs [facility] test Clonoseq completed on 5/19/25.      Her contact is [phone]  Fax: [phone]

## 2025-05-21 ENCOUNTER — SOCIAL WORK SERVICES (OUTPATIENT)
Age: 65
End: 2025-05-21

## 2025-05-21 LAB
CD10 CELLS NFR SPEC: 3 %
CD10/CD19: 2 %
CD19 CELLS NFR SPEC: 4 %
CD19+ MM: 100 %
CD19+/CD200+: 1 %
CD19+/CD38+ MM: 100 %
CD2 CELLS NFR SPEC: 97 %
CD20 CELLS NFR SPEC: 1 %
CD200 CELLS: 23 %
CD3 CELLS NFR SPEC: 70 %
CD3+/TCRGD+: 6 %
CD3+CD4+ CELLS NFR SPEC: 32 %
CD3+CD4+ CELLS/CD3+CD8+ CLL SPEC: 0.8
CD3+CD8+ CELLS NFR SPEC: 38 %
CD3-/CD56+: 28 %
CD34 CELLS NFR SPEC: <1 %
CD38 CELLS NFR SPEC: 11 %
CD38+/CD19+: <1 %
CD45 CELLS NFR SPEC: 100 %
CD45-/CD38+ KAPPA: 67 %
CD45-/CD38+ LAMBDA: 33 %
CD5 CELLS NFR SPEC: 66 %
CD5/CD19 CELLS: <1 %
CD56 MM: 33 %
CD7 CELLS NFR SPEC: 90 %
CELL SURF LAMBDA LIGHT CHAIN: <1 %
CELL SURFACE KAPPA LIGHT CHAIN: <1 %
CELLS ANALYZED LEUK/LYM: 20
CELLS COUNTED LEUK/LYM: 20
CELLS KARYOTYPED LEUK/LYM: 2
GTG BAND LEUK/LYM: 400
TCR G-D CELLS NFR SPEC: 6 %

## 2025-05-21 NOTE — PROGRESS NOTES
sent detailed We[employer] message to provide resources. Patient scored 5 on Distress Screen with Practical and Emotional concerns.   Educated on available resources, providing Social Work Support Packet including Cancer Center Support Services, [facility], Transportation, and Home Care contacts. Educated on BHI if desired.  educated on FMLA/STD benefits, encouraging patient/family to confirm benefits with employer if needed. Provided Forms Department flyer as well for any paperwork that may be required.  educated on Power of  for Healthcare, providing document for review; Patient is aware to reach out if they choose to complete. Contact provided and family is aware to reach out with any needs.

## 2025-05-22 ENCOUNTER — HOSPITAL ENCOUNTER (OUTPATIENT)
Dept: RADIATION ONCOLOGY | Facility: HOSPITAL | Age: 65
Discharge: HOME | End: 2025-05-22
Attending: RADIOLOGY
Payer: COMMERCIAL

## 2025-05-22 PROCEDURE — 77399 UNLISTED PX MED RADJ PHYSICS: CPT | Performed by: RADIOLOGY

## 2025-05-22 PROCEDURE — 77290 THER RAD SIMULAJ FIELD CPLX: CPT | Performed by: RADIOLOGY

## 2025-05-22 PROCEDURE — 77334 RADIATION TREATMENT AID(S): CPT | Performed by: RADIOLOGY

## 2025-05-23 DIAGNOSIS — C90.00 MULTIPLE PLASMACYTOMA OF BONE (HCC): ICD-10-CM

## 2025-05-23 RX ORDER — LENALIDOMIDE 15 MG/1
CAPSULE ORAL
Qty: 21 CAPSULE | Refills: 0 | Status: SHIPPED | OUTPATIENT
Start: 2025-05-23 | End: 2025-06-23

## 2025-05-27 NOTE — CONSULTS
[facility]    PATIENT'S NAME: VALERIA PERDOMO   RADIATION ONCOLOGIST: Yordy Garcia M.D.   [facility] ACC[facility]: [id number] [facility]   MEDICAL RECORD #: [id number] YOB: 1960   CONSULTATION DATE: 05/19/2025       RADIATION ONCOLOGY CONSULTATION    REFERRING PHYSICIAN:  Ehsan Amador MD    DIAGNOSIS:  Multiple myeloma.    HISTORY OF PRESENT ILLNESS:  The patient is a 64-year-old male with a history of multiple myeloma dating back over 5 years.  He initially presented with extramedullary nasal plasmacytoma and a workup showed lambda light chain myeloma.  By March 2019, he had less than 3% plasma cells with normal flow and cytogenetics.  A PET-CT scan in December 2020 showed multiple lytic lesions consistent with multiple myeloma involving the left medial femoral condyle, the anterior left patella, distal left tibia, left navicular bone, and left first metatarsal head and left first proximal phalangeal head.  A repeat bone marrow biopsy at that time showed 5% plasma cells.  In January 2021, a left tibial biopsy was positive for a plasma cell neoplasm and he then was treated with RVD and autologous transplant at [facility].  He continues on Revlimid maintenance since that time.  A PET scan in May 2024 had no evidence of disease.  By May 2025, the PET scan showed uptake in a left tibial lesion with an SUV of 6.9 compared to 2.0 on the prior exam.  He has continued on Revlimid at this time.  He underwent a repeat bone marrow biopsy which showed no significant myeloma presence and is, therefore, found to have oligoprogression in the left tibia lesion and then was referred to Radiation Oncology for consideration of treatment to this location.    The patient otherwise is totally asymptomatic and has no pain in that location and no difficulty with ambulation.  He has no weight loss, changes in appetite, fevers, chills, nausea, vomiting, or other significant issues.    PAST MEDICAL  HISTORY:  The patient has a past history of multiple myeloma as per HPI.  He also has a history of hypertension, hyperlipidemia, mitral regurgitation, obesity, sleep apnea, and gout.    PAST SURGICAL HISTORY:  Hernia repair, cataract surgery, left retinal repair, right total knee replacement.    MEDICATIONS:  Acyclovir, aspirin, atorvastatin, valsartan, hydrochlorothiazide.     ALLERGIES:  Latanoprost and penicillin.    FAMILY HISTORY:  Negative for malignancy.    SOCIAL HISTORY:  The patient is a never smoker who reports about 2 glasses of wine per week.  He is seen with family and denies any transportation-related difficulties.    REVIEW OF SYSTEMS:  A 10-point review of systems is performed.  Pertinent positives and negatives are as per HPI.      PHYSICAL EXAMINATION:    GENERAL:  A 64-year-old male who is pleasant, cooperative, and alert, awake, oriented x3.  He is in no acute distress.  He has an ECOG performance score of 0 and a current pain score of 0.  VITAL SIGNS:  Blood pressure of 113/76, pulse of 71, respiratory rate of 18, and temperature 98.1.  His weight is 247 pounds.  NECK:  Supple with no lymphadenopathy.  LUNGS:  Clear to auscultation bilaterally.  HEART:  Regular rate and rhythm.  Normal S1, S2, and no audible murmurs.  LYMPHATICS:  No supraclavicular, axillary, or inguinal lymphadenopathy.  ABDOMEN:  Soft, nontender, and nondistended with normoactive bowel sounds and no hepatosplenomegaly.  EXTREMITIES:  Without clubbing, cyanosis, edema.  NEUROLOGIC:  Cranial nerves II-XII are grossly intact.    IMPRESSION:  This is a 64-year-old male with a long history of multiple myeloma.  He has been well managed and controlled on Revlimid for about 5 years.  Recently, imaging shows increase in a previously biopsy-proven area in the left tibia.  This appears to be his only site of disease progression and he continues to have controlled marrow per recent biopsy.  He was then referred to Radiation Oncology to  consider treatment to this oligoprogressive site of disease.    RECOMMENDATIONS:  I do believe it makes sense to offer radiation to his site of oligoprogression at the left tibia.  To that extent, I would recommend treatment to include the bony lesion plus a small margin around that location.  I would recommend treating this as we would a solitary plasmacytoma as this appears functionally to be about the same thing at this point.  I, therefore, would recommend 4000 cGy in 20 fractions which should be quite efficacious and sufficient in controlling disease in that location.    I then had a long talk with the patient regarding the potential side effects of this treatment.  Fortunately, it will be quite unlikely for him to have much in the way of side effects or issues with this dose and in this location.  I told him that he may get some minor skin irritation including redness, itching, peeling, dryness, and blistering.  However, given the overall low dose, this would be rather unexpected.  There should be no impact on ambulation and he may have some minor fatigue but this should be manageable as well.  These side effects should all be short-lived and I would not expect any long-term or permanent side effects as a result of these treatments.  Lymphedema of the foot is theoretically possible though highly unlikely at these dosing and we will make certain to spare at least a strip of skin to allow for appropriate drainage.  Following this long and thorough discussion of all the risks and benefits of treatment, the patient indicated that he understood all these issues and does wish to proceed with treatment as I previously dictated.    We, therefore, will schedule the patient for simulation soon with the intent to begin treatment shortly thereafter.      Thank you very much for allowing me the opportunity to participate in the care of this patient.  If there should be questions regarding the radiotherapy, please feel free  to contact me at any time.    Dictated By Yordy Garcia M.D.  d: 05/27/2025 09:23:34  t: 05/27/2025 09:46:46  [facility] [id number]  NAD/    cc: MD Brian Spencer M.D.

## 2025-05-28 PROCEDURE — 77295 3-D RADIOTHERAPY PLAN: CPT | Performed by: RADIOLOGY

## 2025-05-28 PROCEDURE — 77300 RADIATION THERAPY DOSE PLAN: CPT | Performed by: RADIOLOGY

## 2025-05-28 PROCEDURE — 77334 RADIATION TREATMENT AID(S): CPT | Performed by: RADIOLOGY

## 2025-05-29 ENCOUNTER — HOSPITAL ENCOUNTER (OUTPATIENT)
Dept: RADIATION ONCOLOGY | Facility: HOSPITAL | Age: 65
Discharge: HOME | End: 2025-05-29
Attending: RADIOLOGY
Payer: COMMERCIAL

## 2025-05-29 PROCEDURE — 77280 THER RAD SIMULAJ FIELD SMPL: CPT | Performed by: RADIOLOGY

## 2025-05-29 PROCEDURE — 77412 RADIATION TX DELIVERY LVL 3: CPT | Performed by: RADIOLOGY

## 2025-05-30 ENCOUNTER — OFFICE VISIT (OUTPATIENT)
Age: 65
End: 2025-05-30
Attending: INTERNAL MEDICINE
Payer: COMMERCIAL

## 2025-05-30 VITALS
HEIGHT: 71.65 IN | TEMPERATURE: 98 F | OXYGEN SATURATION: 97 % | SYSTOLIC BLOOD PRESSURE: 116 MMHG | RESPIRATION RATE: 16 BRPM | BODY MASS INDEX: 33.69 KG/M2 | DIASTOLIC BLOOD PRESSURE: 74 MMHG | WEIGHT: 246 LBS | HEART RATE: 52 BPM

## 2025-05-30 DIAGNOSIS — C90.00 MULTIPLE PLASMACYTOMA OF BONE (HCC): Primary | ICD-10-CM

## 2025-05-30 DIAGNOSIS — C90.00 MULTIPLE MYELOMA NOT HAVING ACHIEVED REMISSION (HCC): Primary | ICD-10-CM

## 2025-05-30 DIAGNOSIS — Z12.5 ENCOUNTER FOR PROSTATE CANCER SCREENING: ICD-10-CM

## 2025-05-30 PROCEDURE — 77412 RADIATION TX DELIVERY LVL 3: CPT | Performed by: RADIOLOGY

## 2025-05-30 PROCEDURE — 77387 GUIDANCE FOR RADJ TX DLVR: CPT | Performed by: RADIOLOGY

## 2025-05-30 NOTE — PROGRESS NOTES
Education Record    Learner:  Patient    Disease / Diagnosis: Multiple Myeloma     Barriers / Limitations:  None   Comments:    Method:  Discussion   Comments:    General Topics:  Medication, Side effects and symptom management, and Plan of care reviewed   Comments:    Outcome:  Shows understanding   Comments:    Here for follow up and zometa infusion. Taking revlimid without issue. Started radiation and has 18 more treatments to go. Waiting on Clonoseq results from recent bone marrow biopsy. No side effects from radiation so far.

## 2025-05-30 NOTE — PROGRESS NOTES
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 SPEP/RAFAEL no monoclonal protein, normal light chains  3/2019: <3% plasma cells with normal flow and cytogenetics  12/23/20: PET/CT with multiple lytic lesions consistent with multiple myeloma involving the left medial femoral condyle, anterior left patella, distal left tibia, left navicular bone and left 1st metatarsal head and left 1st proximal phalangeal head.  12/29/20: Repeat bmbx with 5% plasma cells  1/19/21: Left tibial lesion biopsy positive for plasma cell neoplasm, monosomy 13, monosomy 14, 1p deletion  7/2021: Received RVD->autologous transplant at [place], now on Revlimid maintenance  5/3/24: PET with LIZBETH  5/9/25: PET with uptake in L tibial lesion SUV 2.0->6.9, previously biopsy-proven plasmacytoma. Chronic previous lytic lucencies seen without uptake.  5/15/25: s/p bmbx, path with 3% plasma cell involvement, too few plasma cells for flow cytometry  5/22/25: started RT to tibial lesion    History of Present Illness:      65 y/o M PMH lambda light chain multiple myeloma (with extramedullary presentation and disease) s/p auto-SCT who presents for follow up.    - on revlimid maintenance  - has started RT to tibial lesion, tolerating well  - otherwise no other issues    Past Medical History:  Past Medical History:    Cancer (HCC)    multiple plasmacytoma of bone    Chronic idiopathic gout involving toe of left foot    High blood pressure    High cholesterol    History of total right knee replacement - 10/2018    Hypercholesterolemia    Mitral regurgitation    Mitral valve prolapse    Obesity    Other and unspecified  hyperlipidemia    Sleep apnea    AHI 41    Unspecified essential hypertension     Past Surgical History:   Procedure Laterality Date    Bone marrow aspirate &biopsy  03/22/2019    Bone marrow aspirate &biopsy  12/29/2020    Cataract Bilateral     IOL's    Colonoscopy  11/2012    Eye surgery Left     Retinal detachment     Hernia surgery      At age 2 hernia repair    Other surgical history  12/27/2018    Nasal polyps removed - found to be extramedullay plasmacytoma    Total knee replacement Right 10/2018       Home Medications:  No outpatient medications have been marked as taking for the 5/30/25 encounter (Office Visit) with Ehsan Amador MD.       Allergies:   Allergies   Allergen Reactions    Latanoprost SWELLING     Eye swelling and visual changes     Penicillins OTHER (SEE COMMENTS) and HIVES     1.  How long ago did you have a reaction to penicillin?  Childhood  2.  Do YOU recall the reaction, or did someone tell you about it? I don't know  3.  How long after you took penicillin did the reaction start? I don't know  4.  What happened when you took penicillin? I don't know  5.  Since your reaction, have you taken other beta-lactam antibiotics without reaction?  No       Psychosocial History:  Social History     Socioeconomic History    Marital status:      Spouse name: Not on file    Number of children: 2    Years of education: Not on file    Highest education level: Not on file   Occupational History    Occupation: Sales and marketing   Tobacco Use    Smoking status: Never    Smokeless tobacco: Never   Vaping Use    Vaping status: Never Used   Substance and Sexual Activity    Alcohol use: Yes     Alcohol/week: 2.0 standard drinks of alcohol     Types: 2 Glasses of wine per week     Comment: 0-1 per month    Drug use: No    Sexual activity: Not on file   Other Topics Concern    Not on file   Social History Narrative    - lives with wife    2 grown kids    Works in sales and marketing     Social  Drivers of Health     Food Insecurity: No Food Insecurity (3/5/2024)    Received from [facility]    Hunger Vital Sign     Worried About Running Out of Food in the Last Year: Never true     Ran Out of Food in the Last Year: Never true   Transportation Needs: No Transportation Needs (3/5/2024)    Received from [facility]    PRAPARE - Transportation     Lack of Transportation (Medical): No     Lack of Transportation (Non-Medical): No   Housing Stability: Low Risk  (3/5/2024)    Received from [facility]    Housing Stability Vital Sign     Unable to Pay for Housing in the Last Year: No     Number of Places Lived in the Last Year: 2     Unstable Housing in the Last Year: No       Family Medical History:  Family History   Problem Relation Age of Onset    Hypertension Mother        Review of Systems:  A 10-point ROS was done with pertinent positives and negative per the HPI    Vital Signs:  Height: 182 cm (5' 11.65\") (05/30 0931)  Weight: 111.6 kg (246 lb) (05/30 0931)  BSA (Calculated - sq m): 2.32 sq meters (05/30 0931)  Pulse: 52 (05/30 0931)  BP: 116/74 (05/30 0931)  Temp: 97.9 °F (36.6 °C) (05/30 0931)  Do Not Use - Resp Rate: --  SpO2: 97 % (05/30 0931)    Wt Readings from Last 6 Encounters:   05/30/25 111.6 kg (246 lb)   05/19/25 112 kg (247 lb)   05/12/25 106.6 kg (235 lb)   05/12/25 112.6 kg (248 lb 3.2 oz)   03/07/25 112.5 kg (248 lb)   12/06/24 114.7 kg (252 lb 12.8 oz)       ECOG PS: 0    Physical Examination:  General: Patient is alert and oriented, not in acute distress  Psych: Mood and affect are appropriate  Eyes: EOMI, PERRL  ENT: Oropharynx is clear, no adenopathy  CV: nl s1/s2, no LE edema  Respiratory: ctab Non labored respirations  GI/Abd: Soft, non-tender  Neurological: Grossly intact   Lymphatics: No palpable cervical, supraclavicular, axillary, or inguinal lymphadenopathy  Skin: no rashes or petechiae    Laboratory:  Lab Results    Component Value Date    WBC 2.6 (L) 05/12/2025    WBC 2.6 (L) 03/07/2025    WBC 2.8 (L) 12/06/2024    HGB 15.1 05/12/2025    HGB 14.1 03/07/2025    HGB 13.9 12/06/2024    HCT 42.1 05/12/2025    MCV 88.8 05/12/2025    MCH 31.9 05/12/2025    MCHC 35.9 05/12/2025    RDW 15.2 05/12/2025    .0 05/12/2025    .0 03/07/2025    .0 (L) 12/06/2024     Lab Results   Component Value Date    GLU 99 05/12/2025    BUN 15 05/12/2025    BUNCREA 13.3 05/07/2021    CREATSERUM 1.02 05/12/2025    CREATSERUM 1.11 03/07/2025    CREATSERUM 1.07 12/06/2024    ANIONGAP 3 05/12/2025    GFRNAA 89 07/22/2022    GFRNAA 89 07/22/2022    GFRAA 103 07/22/2022    GFRAA 103 07/22/2022    CA 9.0 05/12/2025    OSMOCALC 287 05/12/2025    ALKPHO 60 05/12/2025    AST 21 05/12/2025    ALT 25 05/12/2025    BILT 0.9 05/12/2025    TP 6.4 05/12/2025    TP 6.0 05/12/2025    ALB 4.4 05/12/2025    ALB 3.86 05/12/2025    GLOBULIN 2.0 05/12/2025     05/12/2025    K 3.7 05/12/2025     05/12/2025    CO2 26.0 05/12/2025     Lab Results   Component Value Date    INR 1.02 05/15/2025           Impression & Plan:     Lambda light chain multiple myeloma  - had extramedullary disease at presentation and diffuse lytic lesions  - s/p VRD induction + autologous transplant at [place] 2021 now on revlimid maintenance  - no prior evidence of disease on labs, he had 5% plasma cells on bone marrow and previously had mildly elevated circulating lambda FLC  - PET/CT with relapsed L tibial plasmacytoma which was biopsy proven. He remains asymptomatic with no bone pain  - bmbx  to use L tibial plasmacytoma in 1/2021 as ID to see if he has systemic disease. Clonoseq pending  - given PET/CT with only oligometastatic recurrence in L tibia and no significant myeloma in bone, will continue on revlimid maintenance for now and continue RT to L tibia  - has been discussed with Dr Reno  - plan repeat PET/CT 3 months after RT finished  - continue [id number] IV  zometa for now    F/u: in ~4 months, after PET/CT    Ehsan Amador MD  [facility] Hematology Oncology

## 2025-05-30 NOTE — PROGRESS NOTES
[facility] Radiation Treatment Management Note 1-5    Patient:  Tyshawn Butler  Age:  64 year old  Visit Diagnosis:    1. Multiple plasmacytoma of bone (HCC) [C90.00]      Primary Rad/Onc:  Dr. Yordy Garcia    Site Delivered Dose (cGy) Prescribed Dose (cGy) Fraction #   LEFT TIBIA 600 4000 3/20     First treatment date:   5/29/25  Concurrent chemotherapy:  N/A        5/19/2025    12:45 PM 5/30/2025     9:31 AM 6/2/2025     7:40 AM   Oncology Vitals   Height  5' 11.654\"    Height  182 cm    Weight  246 lb    Weight  111.585 kg    BSA (m2)  2.32 m2    BMI  33.69 kg/m2    /76 116/74 128/86   Pulse 71 52 59   Resp 18 16 20   Temp 98.1 °F (36.7 °C) 97.9 °F (36.6 °C) 97.4 °F (36.3 °C)   SpO2 97 % 97 % 97 %   Pain Score   0        Toxicities:  Fatigue Grade 0= None  Bone pain Grade 0= None  Gait disturbance Grade 0= None  Muscle weakness Grade 0= None     Nursing Note:  RN ED done with pt:  Reviewed potential side effects of RT and management.  Denies pain to treated area.  Ambulates with steady gait.  Has PRN Tramadol if pain increased, hardly using.    Lanny JOSEPH RN MD NOTE   Reviewed and agree with RN note above.  Setup imaging reviewed in ARIA and approved.    S:  -doing well, no pain    O:  -no skin changes    A/P:  -cont RT    OTV in 1 week    Oh-Anup Porras MD  Radiation Oncology

## 2025-05-30 NOTE — PROGRESS NOTES
Patient arrives to infusion for every 12 week Zometa . Patient denies any issues or concerns.      Ordering Provider: Perry  Order Exp: ongoing     Patient appeared to tolerate infusion without difficulty or complaint. No s/s of adverse reaction noted. Reviewed next apt date/time: 9/26    Education Record  Learner:  Patient  Disease / Diagnosis: Multiple Myleoma  Barriers / Limitations:  None  Method:  Discussion  General Topics:  Plan of care reviewed  Outcome:  Shows understanding

## 2025-06-01 ENCOUNTER — HOSPITAL ENCOUNTER (OUTPATIENT)
Dept: RADIATION ONCOLOGY | Facility: HOSPITAL | Age: 65
Discharge: HOME | End: 2025-06-01
Attending: RADIOLOGY
Payer: COMMERCIAL

## 2025-06-02 ENCOUNTER — HOSPITAL ENCOUNTER (OUTPATIENT)
Dept: RADIATION ONCOLOGY | Facility: HOSPITAL | Age: 65
End: 2025-06-02
Attending: RADIOLOGY
Payer: COMMERCIAL

## 2025-06-02 VITALS
OXYGEN SATURATION: 97 % | DIASTOLIC BLOOD PRESSURE: 86 MMHG | SYSTOLIC BLOOD PRESSURE: 128 MMHG | RESPIRATION RATE: 20 BRPM | TEMPERATURE: 97 F | HEART RATE: 59 BPM

## 2025-06-02 DIAGNOSIS — C90.00 MULTIPLE PLASMACYTOMA OF BONE (HCC): Primary | ICD-10-CM

## 2025-06-02 PROCEDURE — 77387 GUIDANCE FOR RADJ TX DLVR: CPT | Performed by: RADIOLOGY

## 2025-06-02 PROCEDURE — 77412 RADIATION TX DELIVERY LVL 3: CPT | Performed by: RADIOLOGY

## 2025-06-03 PROCEDURE — 77417 THER RADIOLOGY PORT IMAGE(S): CPT | Performed by: RADIOLOGY

## 2025-06-03 PROCEDURE — 77412 RADIATION TX DELIVERY LVL 3: CPT | Performed by: RADIOLOGY

## 2025-06-04 PROCEDURE — 77412 RADIATION TX DELIVERY LVL 3: CPT | Performed by: RADIOLOGY

## 2025-06-05 PROCEDURE — 77412 RADIATION TX DELIVERY LVL 3: CPT | Performed by: RADIOLOGY

## 2025-06-06 PROCEDURE — 77336 RADIATION PHYSICS CONSULT: CPT | Performed by: RADIOLOGY

## 2025-06-06 PROCEDURE — 77412 RADIATION TX DELIVERY LVL 3: CPT | Performed by: RADIOLOGY

## 2025-06-06 NOTE — PROGRESS NOTES
[facility] Radiation Treatment Management Note 6-10    Patient:  Tyshawn Butler  Age:  64 year old  Visit Diagnosis:    1. Multiple plasmacytoma of bone (HCC) [C90.00]      Primary Rad/Onc:  Dr. Yordy Garcia    Site Delivered Dose (cGy) Prescribed Dose (cGy) Fraction #   LEFT TIBIA 1600 4000 8/20     First treatment date:   5/29/25  Concurrent chemotherapy:  N/A        5/30/2025     9:31 AM 6/2/2025     7:40 AM 6/9/2025     7:31 AM   Oncology Vitals   Height 5' 11.654\"     Height 182 cm     Weight 246 lb  245 lb 12.8 oz   Weight 111.585 kg  111.494 kg   BSA (m2) 2.32 m2  2.32 m2   BMI 33.69 kg/m2  33.66 kg/m2   /74 128/86 123/80   Pulse 52 59 58   Resp 16 20 20   Temp 97.9 °F (36.6 °C) 97.4 °F (36.3 °C) 97.2 °F (36.2 °C)   SpO2 97 % 97 % 95 %   Pain Score  0 0        Toxicities:  Fatigue Grade 1= Fatigue relieved by rest  Bone pain Grade 0= None  Gait disturbance Grade 0= None  Muscle weakness Grade 0= None     Nursing Note:  Feels well.  Mild erythema to leg.  No desquamation or itching to site.    Lanny JOSEPH RN    Physician Note:  Subjective:  Doing well, no issues or c/o.  Tolerating without difficulty.      Objective:  Unchanged      Treatment setup imaging have been reviewed:  Yes    Assessment/Plan:    Continue radiotherapy per plan    Next visit:  1 week    Dr. Yordy Garcia

## 2025-06-09 ENCOUNTER — HOSPITAL ENCOUNTER (OUTPATIENT)
Dept: RADIATION ONCOLOGY | Facility: HOSPITAL | Age: 65
End: 2025-06-09
Attending: RADIOLOGY
Payer: COMMERCIAL

## 2025-06-09 VITALS
TEMPERATURE: 97 F | DIASTOLIC BLOOD PRESSURE: 80 MMHG | RESPIRATION RATE: 20 BRPM | OXYGEN SATURATION: 95 % | SYSTOLIC BLOOD PRESSURE: 123 MMHG | HEART RATE: 58 BPM | WEIGHT: 245.81 LBS | BODY MASS INDEX: 34 KG/M2

## 2025-06-09 DIAGNOSIS — C90.00 MULTIPLE PLASMACYTOMA OF BONE (HCC): Primary | ICD-10-CM

## 2025-06-09 PROCEDURE — 77417 THER RADIOLOGY PORT IMAGE(S): CPT | Performed by: RADIOLOGY

## 2025-06-09 PROCEDURE — 77412 RADIATION TX DELIVERY LVL 3: CPT | Performed by: RADIOLOGY

## 2025-06-10 PROCEDURE — 77412 RADIATION TX DELIVERY LVL 3: CPT | Performed by: RADIOLOGY

## 2025-06-11 PROCEDURE — 77412 RADIATION TX DELIVERY LVL 3: CPT | Performed by: RADIOLOGY

## 2025-06-12 PROCEDURE — 77412 RADIATION TX DELIVERY LVL 3: CPT | Performed by: RADIOLOGY

## 2025-06-13 PROCEDURE — 77412 RADIATION TX DELIVERY LVL 3: CPT | Performed by: RADIOLOGY

## 2025-06-13 PROCEDURE — 77336 RADIATION PHYSICS CONSULT: CPT | Performed by: RADIOLOGY

## 2025-06-16 ENCOUNTER — APPOINTMENT (OUTPATIENT)
Dept: RADIATION ONCOLOGY | Facility: HOSPITAL | Age: 65
End: 2025-06-16
Attending: RADIOLOGY
Payer: COMMERCIAL

## 2025-06-16 PROCEDURE — 77417 THER RADIOLOGY PORT IMAGE(S): CPT | Performed by: RADIOLOGY

## 2025-06-16 PROCEDURE — 77412 RADIATION TX DELIVERY LVL 3: CPT | Performed by: RADIOLOGY

## 2025-06-17 PROCEDURE — 77412 RADIATION TX DELIVERY LVL 3: CPT | Performed by: RADIOLOGY

## 2025-06-17 NOTE — PROGRESS NOTES
[facility] Radiation Treatment Management Note 11-15    Patient:  Tyshawn Butler  Age:  64 year old  Visit Diagnosis:    1. Multiple plasmacytoma of bone (HCC) [C90.00]      Primary Rad/Onc:  Dr. Yordy Garcia    Site Delivered Dose (cGy) Prescribed Dose (cGy) Fraction #   L TIbia 3000 4000 15/20     First treatment date:  5/29/25  Concurrent chemotherapy: N/A        6/2/2025     7:40 AM 6/9/2025     7:31 AM 6/18/2025     7:44 AM   Oncology Vitals   Weight  245 lb 12.8 oz 245 lb 6.4 oz   Weight  111.494 kg 111.313 kg   BSA (m2)  2.32 m2 2.32 m2   BMI  33.66 kg/m2 33.6 kg/m2   /86 123/80 113/74   Pulse 59 58 74   Resp 20 20 18   Temp 97.4 °F (36.3 °C) 97.2 °F (36.2 °C) 97 °F (36.1 °C)   SpO2 97 % 95 % 95 %   Pain Score 0 0 0        Toxicities:  Fatigue Grade 1= Fatigue relieved by rest  Bone pain Grade 0= None- just every once in a while \"soreness\"  Gait disturbance Grade 0= None  Muscle weakness Grade 0= None    Nursing Note:  Patient seen for OTV with Dr. Porras  \"Soreness\" to left leg intermittently. Self resolves  Not taking OTC medications   Skin intact.     Araceli CASTAÑEDA RN MD NOTE   Reviewed and agree with RN note above.  Setup imaging reviewed in ARIA and approved.    S:  -see RN note.    O:  -no changes    A/P:  -cont RT    OTV in 1 week    Oh-Anup Porras MD  Radiation Oncology

## 2025-06-18 ENCOUNTER — HOSPITAL ENCOUNTER (OUTPATIENT)
Dept: RADIATION ONCOLOGY | Facility: HOSPITAL | Age: 65
Discharge: HOME | End: 2025-06-18
Attending: RADIOLOGY
Payer: COMMERCIAL

## 2025-06-18 VITALS
BODY MASS INDEX: 34 KG/M2 | OXYGEN SATURATION: 95 % | TEMPERATURE: 97 F | RESPIRATION RATE: 18 BRPM | DIASTOLIC BLOOD PRESSURE: 74 MMHG | WEIGHT: 245.38 LBS | HEART RATE: 74 BPM | SYSTOLIC BLOOD PRESSURE: 113 MMHG

## 2025-06-18 DIAGNOSIS — C90.00 MULTIPLE PLASMACYTOMA OF BONE (HCC): Primary | ICD-10-CM

## 2025-06-18 PROCEDURE — 77412 RADIATION TX DELIVERY LVL 3: CPT | Performed by: RADIOLOGY

## 2025-06-18 RX ORDER — DORZOLAMIDE HYDROCHLORIDE AND TIMOLOL MALEATE 20; 5 MG/ML; MG/ML
1 SOLUTION/ DROPS OPHTHALMIC 2 TIMES DAILY
COMMUNITY
Start: 2025-05-14

## 2025-06-19 PROCEDURE — 77412 RADIATION TX DELIVERY LVL 3: CPT | Performed by: RADIOLOGY

## 2025-06-20 PROCEDURE — 77412 RADIATION TX DELIVERY LVL 3: CPT | Performed by: RADIOLOGY

## 2025-06-20 PROCEDURE — 77336 RADIATION PHYSICS CONSULT: CPT | Performed by: RADIOLOGY

## 2025-06-20 NOTE — PROGRESS NOTES
[facility] Radiation Treatment Management Note 16-20    Patient:  Tyshawn Butler  Age:  64 year old  Visit Diagnosis:    1. Multiple plasmacytoma of bone (HCC) [C90.00]      Primary Rad/Onc:  Dr. Yordy Garcia    Site Delivered Dose (cGy) Prescribed Dose (cGy) Fraction #   L TIBIA 3600 4000 18/20     First treatment date:  5/29/25  Concurrent chemotherapy: N/A        6/18/2025     7:44 AM 6/23/2025     7:46 AM 6/23/2025     7:49 AM   Oncology Vitals   Weight 245 lb 6.4 oz 248 lb 3.2 oz    Weight 111.313 kg 112.583 kg    BSA (m2) 2.32 m2 2.33 m2    BMI 33.6 kg/m2 33.99 kg/m2    /74  118/75   Pulse 74  57   Resp 18  18   Temp 97 °F (36.1 °C)  98.5 °F (36.9 °C)   SpO2 95 %  98 %   Pain Score 0  0        Toxicities:  Fatigue Grade 1= Fatigue relieved by rest  Bone pain Grade 0= None  Gait disturbance Grade 0= None  Muscle weakness Grade 0= None      Nursing Note:  No complaints today  Will finish RT on 6/25/25  No pain  Plan for PET 9/19/25    Roxy JOSEPH RN    Physician Note:  Subjective:  Doing well, no issues or c/o.  Toelrated RT without difficulty.      Objective:  Unchanged      Treatment setup imaging have been reviewed:  Yes    Assessment/Plan:    Continue radiotherapy per plan    Completes Wed    Next visit:  f/u prn    Dr. Yordy Garcia

## 2025-06-23 ENCOUNTER — HOSPITAL ENCOUNTER (OUTPATIENT)
Dept: RADIATION ONCOLOGY | Facility: HOSPITAL | Age: 65
End: 2025-06-23
Attending: RADIOLOGY
Payer: COMMERCIAL

## 2025-06-23 VITALS
DIASTOLIC BLOOD PRESSURE: 75 MMHG | WEIGHT: 248.19 LBS | HEART RATE: 57 BPM | OXYGEN SATURATION: 98 % | SYSTOLIC BLOOD PRESSURE: 118 MMHG | BODY MASS INDEX: 34 KG/M2 | TEMPERATURE: 99 F | RESPIRATION RATE: 18 BRPM

## 2025-06-23 DIAGNOSIS — C90.00 MULTIPLE PLASMACYTOMA OF BONE (HCC): Primary | ICD-10-CM

## 2025-06-23 PROCEDURE — 77417 THER RADIOLOGY PORT IMAGE(S): CPT | Performed by: RADIOLOGY

## 2025-06-23 PROCEDURE — 77412 RADIATION TX DELIVERY LVL 3: CPT | Performed by: RADIOLOGY

## 2025-06-23 NOTE — PATIENT INSTRUCTIONS
- FOLLOW-UP WITH DR. NICOLÁS PEARSON ONLY IF NEEDED    - CALL [phone] IF YOU HAVE ANY QUESTIONS/CONCERNS REGARDING RADIATION THERAPY

## 2025-06-24 PROCEDURE — 77412 RADIATION TX DELIVERY LVL 3: CPT | Performed by: RADIOLOGY

## 2025-06-25 PROCEDURE — 77336 RADIATION PHYSICS CONSULT: CPT | Performed by: RADIOLOGY

## 2025-06-25 PROCEDURE — 77412 RADIATION TX DELIVERY LVL 3: CPT | Performed by: RADIOLOGY

## 2025-07-22 DIAGNOSIS — C90.00 MULTIPLE PLASMACYTOMA OF BONE (HCC): ICD-10-CM

## 2025-07-22 RX ORDER — LENALIDOMIDE 15 MG/1
CAPSULE ORAL
Qty: 21 CAPSULE | Refills: 0 | Status: SHIPPED | OUTPATIENT
Start: 2025-07-22

## 2025-07-23 ENCOUNTER — PATIENT MESSAGE (OUTPATIENT)
Facility: LOCATION | Age: 65
End: 2025-07-23

## 2025-07-23 DIAGNOSIS — C90.00 MULTIPLE MYELOMA NOT HAVING ACHIEVED REMISSION (HCC): ICD-10-CM

## 2025-07-23 DIAGNOSIS — U07.1 COVID: ICD-10-CM

## 2025-07-23 DIAGNOSIS — J11.1 INFLUENZA: ICD-10-CM

## 2025-07-23 DIAGNOSIS — R05.2 SUBACUTE COUGH: Primary | ICD-10-CM

## 2025-07-30 RX ORDER — BENZONATATE 100 MG/1
100 CAPSULE ORAL 3 TIMES DAILY PRN
Qty: 30 CAPSULE | Refills: 0 | Status: SHIPPED | OUTPATIENT
Start: 2025-07-30

## 2025-08-04 ENCOUNTER — HOSPITAL ENCOUNTER (OUTPATIENT)
Dept: GENERAL RADIOLOGY | Facility: HOSPITAL | Age: 65
Discharge: HOME | End: 2025-08-04
Attending: NURSE PRACTITIONER

## 2025-08-04 ENCOUNTER — LAB ENCOUNTER (OUTPATIENT)
Dept: LAB | Facility: HOSPITAL | Age: 65
End: 2025-08-04
Attending: NURSE PRACTITIONER

## 2025-08-04 DIAGNOSIS — R05.2 SUBACUTE COUGH: ICD-10-CM

## 2025-08-04 DIAGNOSIS — J11.1 INFLUENZA: ICD-10-CM

## 2025-08-04 DIAGNOSIS — U07.1 COVID: ICD-10-CM

## 2025-08-04 DIAGNOSIS — C90.00 MULTIPLE MYELOMA NOT HAVING ACHIEVED REMISSION (HCC): ICD-10-CM

## 2025-08-04 LAB
ALBUMIN SERPL-MCNC: 4.1 G/DL (ref 3.2–4.8)
ALBUMIN/GLOB SERPL: 1.7 (ref 1–2)
ALP LIVER SERPL-CCNC: 96 U/L (ref 45–117)
ALT SERPL-CCNC: 29 U/L (ref 10–49)
ANION GAP SERPL CALC-SCNC: 7 MMOL/L (ref 0–18)
AST SERPL-CCNC: 28 U/L (ref ?–34)
BASOPHILS # BLD AUTO: 0.07 X10(3) UL (ref 0–0.2)
BASOPHILS NFR BLD AUTO: 1.1 %
BILIRUB SERPL-MCNC: 0.5 MG/DL (ref 0.2–1.1)
BUN BLD-MCNC: 14 MG/DL (ref 9–23)
CALCIUM BLD-MCNC: 8.9 MG/DL (ref 8.7–10.6)
CHLORIDE SERPL-SCNC: 104 MMOL/L (ref 98–112)
CO2 SERPL-SCNC: 29 MMOL/L (ref 21–32)
CREAT BLD-MCNC: 1.09 MG/DL (ref 0.7–1.3)
EGFRCR SERPLBLD CKD-EPI 2021: 76 ML/MIN/1.73M2 (ref 60–?)
EOSINOPHIL # BLD AUTO: 0.11 X10(3) UL (ref 0–0.7)
EOSINOPHIL NFR BLD AUTO: 1.8 %
ERYTHROCYTE [DISTWIDTH] IN BLOOD BY AUTOMATED COUNT: 14.2 %
FASTING STATUS PATIENT QL REPORTED: YES
GLOBULIN PLAS-MCNC: 2.4 G/DL (ref 2–3.5)
GLUCOSE BLD-MCNC: 113 MG/DL (ref 70–99)
HCT VFR BLD AUTO: 37.4 % (ref 39–53)
HGB BLD-MCNC: 13.1 G/DL (ref 13–17.5)
IMM GRANULOCYTES # BLD AUTO: 0.04 X10(3) UL (ref 0–1)
IMM GRANULOCYTES NFR BLD: 0.7 %
LYMPHOCYTES # BLD AUTO: 0.8 X10(3) UL (ref 1–4)
LYMPHOCYTES NFR BLD AUTO: 13.1 %
MCH RBC QN AUTO: 31 PG (ref 26–34)
MCHC RBC AUTO-ENTMCNC: 35 G/DL (ref 31–37)
MCV RBC AUTO: 88.6 FL (ref 80–100)
MONOCYTES # BLD AUTO: 1.03 X10(3) UL (ref 0.1–1)
MONOCYTES NFR BLD AUTO: 16.8 %
NEUTROPHILS # BLD AUTO: 4.07 X10 (3) UL (ref 1.5–7.7)
NEUTROPHILS # BLD AUTO: 4.07 X10(3) UL (ref 1.5–7.7)
NEUTROPHILS NFR BLD AUTO: 66.5 %
OSMOLALITY SERPL CALC.SUM OF ELEC: 291 MOSM/KG (ref 275–295)
PLATELET # BLD AUTO: 280 10(3)UL (ref 150–450)
POTASSIUM SERPL-SCNC: 3.4 MMOL/L (ref 3.5–5.1)
PROT SERPL-MCNC: 6.5 G/DL (ref 5.7–8.2)
RBC # BLD AUTO: 4.22 X10(6)UL (ref 4.3–5.7)
SODIUM SERPL-SCNC: 140 MMOL/L (ref 136–145)
WBC # BLD AUTO: 6.1 X10(3) UL (ref 4–11)

## 2025-08-04 PROCEDURE — 71046 X-RAY EXAM CHEST 2 VIEWS: CPT | Performed by: NURSE PRACTITIONER

## 2025-08-04 PROCEDURE — 36415 COLL VENOUS BLD VENIPUNCTURE: CPT

## 2025-08-04 PROCEDURE — 85025 COMPLETE CBC W/AUTO DIFF WBC: CPT

## 2025-08-04 PROCEDURE — 80053 COMPREHEN METABOLIC PANEL: CPT

## 2025-08-05 ENCOUNTER — RESULTS FOLLOW-UP (OUTPATIENT)
Facility: LOCATION | Age: 65
End: 2025-08-05

## 2025-08-05 DIAGNOSIS — U07.1 COVID: ICD-10-CM

## 2025-08-05 DIAGNOSIS — C90.00 MULTIPLE MYELOMA NOT HAVING ACHIEVED REMISSION (HCC): ICD-10-CM

## 2025-08-05 DIAGNOSIS — J18.9 PNEUMONIA DUE TO INFECTIOUS ORGANISM, UNSPECIFIED LATERALITY, UNSPECIFIED PART OF LUNG: ICD-10-CM

## 2025-08-05 DIAGNOSIS — R05.2 SUBACUTE COUGH: Primary | ICD-10-CM

## 2025-08-05 DIAGNOSIS — J11.1 INFLUENZA: ICD-10-CM

## 2025-08-05 RX ORDER — LEVOFLOXACIN 750 MG/1
750 TABLET, FILM COATED ORAL DAILY
Qty: 5 TABLET | Refills: 0 | Status: SHIPPED | OUTPATIENT
Start: 2025-08-05 | End: 2025-08-10

## 2025-08-05 RX ORDER — CODEINE PHOSPHATE AND GUAIFENESIN 10; 100 MG/5ML; MG/5ML
5 SOLUTION ORAL 3 TIMES DAILY PRN
Qty: 118 ML | Refills: 0 | Status: SHIPPED | OUTPATIENT
Start: 2025-08-05

## 2025-08-11 ENCOUNTER — DOCUMENTATION ONLY (OUTPATIENT)
Facility: LOCATION | Age: 65
End: 2025-08-11

## (undated) NOTE — LETTER
Patient Name: Tyshawn Butler        : 1960       Medical Record #: [id number]    CONSENT FOR PROCEDURES/SEDATION    Date: 5/15/2025       Time: 7:06 AM        1. I authorize the performance upon Tyshawn Butler the following:    Image guided bone marrow biopsy and aspiration    2. I authorize Dr. SHARITA Lei (and whomever is designated as the doctor’s assistant), to perform the above mentioned procedures.    3. If any unforeseen conditions arise during this procedure calling for additional procedures, operations, or medications (including anesthesia and blood transfusion), I  further request and authorize the doctor to do whatever he/she deems advisable in my interest.    4. I consent to the taking and reproduction of any photographs in the course of this procedure for professional purposes.    5. I consent to the administration of such sedation as may be considered necessary or advisable by the physician responsible for this service, with the exception of  _____________________________.    6. I have been informed by my doctor of the nature and purpose of this procedure/sedation, possible alternative methods of treatment, risk involved and possible complications.      Signature of Patient:  ___________________________    Signature of person authorized to consent for patient: Relationship to patient:  ___________________________    ___________________    Witness: ____________________     Date: ______________    Provider: ____________________     Date: ______________

## (undated) NOTE — LETTER
To Whom It May Concern: This certifies that Sumit Gibsonn our patient, and has been under our care since February 2019. I am familiar with his history and with the functional limitations imposed by his cancer diagnosis and it's subsequent emotional and mental health issues. Romario Meza meets the definition of disability under the Americans with Disability Act, the [employer], and the [address] Due to this disability and mental health condition, he is qualified to have an emotional support animal under [address] [place] [address] and the [facility]. The available data indicates that his emotional distress substantially limits one or more major life activities and is imposed by his emotional distress as defined by the DSM-5. Due to his cancer diagnosis, he has certain limitations related to social interactions, coping with stress, and anxiety. In order to help alleviate these difficulties, and to enhance his ability to function independently, I am recommending an emotional support animals that will assist Romario Meza in coping with his cancer diagnosis. The presence of this animal is necessary for his emotional and mental health to mitigate the symptoms he is currently experiencing.      Sincerely,          PATEL Doll on behalf of Dr Artuor Muñiz: [phone]  [address] [place] [address],  [address]  [address]  Mela Bello [phone]

## (undated) NOTE — LETTER
Printed: 2021    Patient Name: Darrell Short  : 1960   Medical Record #: [id number]    Consent to [facility], understand that I have been diagnosed with multiple myeloma.     I understand that the treatment s I have had the chance to ask questions about this treatment, and my questions have been answered to my satisfaction. I understand that I can contact my oncologist, or my Cancer Care Team at any time if I have questions, by calling [phone].    Addition

## (undated) NOTE — MR AVS SNAPSHOT
[address] [place]  [phone]                    After Visit Summary   2/19/2021    Joon Nunez    MRN: [id number]           Visit Information     Date & Time  2/19/2021  9:00 AM Multiple myeloma not having achieved remission   [[id number]]  -  Primary           Future Appointments        Provider Department    2/22/2021 2:00 PM [address]    3/5/2021 10:45 AM [facility] Call [phone] for help. [facility] In[facility] is NOT to be used for urgent needs. For medical emergencies, dial 911.

## (undated) NOTE — MR AVS SNAPSHOT
[facility]  [address]   [facility] [address]  [phone]                    After Visit Summary   5/24/2024    Tyshawn Butler   MRN: [id number]           Visit Information     Date & Time  5/24/2024 11:00 AM Provider  SWEETIE SHAW RN2 [facility] [place] Dept. Phone  [phone]      Your Vital Signs Were     Smoking Status   Never             Allergies as of 5/24/2024  Review status set to In Progress on 5/24/2024       Noted Reaction Type Reactions    Latanoprost 12/09/2022   Side Effect SWELLING    Eye swelling and visual changes     Penicillins 10/04/2012    OTHER (SEE COMMENTS), HIVES    1.  How long ago did you have a reaction to penicillin?  Childhood  2.  Do YOU recall the reaction, or did someone tell you about it? I don't know  3.  How long after you took penicillin did the reaction start? I don't know  4.  What happened when you took penicillin? I don't know  5.  Since your reaction, have you taken other beta-lactam antibiotics without reaction?  No      Your Current Medications        Dosage    Lenalidomide 15 MG Oral Cap TAKE 1 CAPSULE DAILY FOR 3 WEEKS ON, THEN 1 WEEK OFF.    traMADol 50 MG Oral Tab Take 1-2 tablets ( mg total) by mouth every 6 (six) hours as needed for Pain.    timolol 0.5 % Ophthalmic Solution Place 1 drop into the left eye 2 (two) times daily.    LORAZEPAM 1 MG Oral Tab One tab prior to testing    Multiple Vitamin (MULTIVITAMIN) Oral Tab Take 1 tablet by mouth daily.    acyclovir 400 MG Oral Tab Take 1 tablet (400 mg total) by mouth 2 (two) times daily.    atorvastatin 40 MG Oral Tab Take 1 tablet (40 mg total) by mouth daily.    Valsartan-hydroCHLOROthiazide 320-12.5 MG Oral Tab Take 1 tablet by mouth daily.    aspirin 325 MG Oral Tab EC Take 1 tablet (325 mg total) by mouth daily.      Diagnoses for This Visit    Multiple plasmacytoma of bone   [400286]  -  Primary  Multiple myeloma not having  achieved remission   [[id number]]             Future Appointments        Provider Department    8/30/2024 11:00 AM SWEETIE TX RN3 [facility]    11/29/2024 10:45 AM Ehsan Amador [facility] [place]    11/29/2024 11:00 AM SWEETIE TX RN1 [facility]      To Do List     Around May 24, 2024   LAB:   CBC W/DIFF [E]        Around May 24, 2024   LAB:   COMP METABOLIC PANEL [E]        Around May 24, 2024   LAB:   IMMUNOGLOBULIN A/G/M, QUANT [E]        Around May 24, 2024   LAB:   MONOCLONAL PROTEIN STUDY [E]        Friday August 30, 2024 11:00 AM   Appointment with SWEETIE SHAW RN3 at [facility] ([phone])      [address]  [facility] [address]      Friday November 29, 2024 10:45 AM   Appointment with Ehsan Amador at [facility] [place] ([phone])      [address]  [facility] [address]      Friday November 29, 2024 11:00 AM   Appointment with SWEETIE SHAW RN1 at [facility] ([phone])      [address]  [facility] [address]         Component Value Flag Ref Range Units Status    WBC 2.6      4.0 - 11.0 x10(3) uL Final    RBC 4.56      4.30 - 5.70 x10(6)uL Final    HGB 13.8      13.0 - 17.5 g/dL Final    HCT 41.0      39.0 - 53.0 % Final    .0      150.0 - 450.0 10(3)uL Final    MCV 89.9      80.0 - 100.0 fL Final    MCH 30.3      26.0 - 34.0 pg Final    MCHC 33.7      31.0 - 37.0 g/dL Final    RDW 16.0       % Final    Neutrophil Absolute Prelim 0.91      1.50 - 7.70 x10 (3) uL Final    Neutrophil Absolute 0.91      1.50 - 7.70 x10(3) uL Final    Lymphocyte Absolute 0.90      1.00 - 4.00 x10(3) uL Final    Monocyte Absolute 0.54      0.10 - 1.00 x10(3) uL Final    Eosinophil Absolute 0.16      0.00 - 0.70 x10(3) uL Final    Basophil Absolute 0.05      0.00 - 0.20 x10(3) uL Final    Immature Granulocyte Absolute 0.02      0.00 - 1.00 x10(3)  uL Final    Neutrophil % 35.3       % Final    Lymphocyte % 34.9       % Final    Monocyte % 20.9       % Final    Eosinophil % 6.2       % Final    Basophil % 1.9       % Final    Immature Granulocyte % 0.8       % Final            MyChart    Visit MyChart  You can access your MyChart to more actively manage your health care and view more details from this visit by going to [email or web address].  If you've recently had a stay at the Hospital you can access your discharge instructions in [employer] by going to Visits < Admission Summaries. If you've been to the Emergency Department or your doctor's office, you can view your past visit information in [employer] by going to Visits < Visit Summaries.   [employer] questions?  Call [phone] for help.  [employer] is NOT to be used for urgent needs.    For medical emergencies, dial 911.